# Patient Record
Sex: FEMALE | Race: WHITE | NOT HISPANIC OR LATINO | Employment: FULL TIME | URBAN - METROPOLITAN AREA
[De-identification: names, ages, dates, MRNs, and addresses within clinical notes are randomized per-mention and may not be internally consistent; named-entity substitution may affect disease eponyms.]

---

## 2017-05-02 ENCOUNTER — HOSPITAL ENCOUNTER (EMERGENCY)
Facility: CLINIC | Age: 37
Discharge: HOME | End: 2017-05-02
Attending: EMERGENCY MEDICINE | Admitting: EMERGENCY MEDICINE
Payer: COMMERCIAL

## 2017-05-02 VITALS
TEMPERATURE: 98.2 F | RESPIRATION RATE: 16 BRPM | SYSTOLIC BLOOD PRESSURE: 111 MMHG | OXYGEN SATURATION: 100 % | HEART RATE: 92 BPM | DIASTOLIC BLOOD PRESSURE: 82 MMHG

## 2017-05-02 DIAGNOSIS — H81.399 PERIPHERAL VERTIGO, UNSPECIFIED LATERALITY: ICD-10-CM

## 2017-05-02 LAB
ANION GAP SERPL CALCULATED.3IONS-SCNC: 7 MMOL/L (ref 3–14)
BUN SERPL-MCNC: 12 MG/DL (ref 7–30)
CALCIUM SERPL-MCNC: 9.7 MG/DL (ref 8.5–10.1)
CHLORIDE SERPL-SCNC: 106 MMOL/L (ref 94–109)
CO2 SERPL-SCNC: 26 MMOL/L (ref 20–32)
CREAT SERPL-MCNC: 0.67 MG/DL (ref 0.52–1.04)
ERYTHROCYTE [DISTWIDTH] IN BLOOD BY AUTOMATED COUNT: 12.1 % (ref 10–15)
GFR SERPL CREATININE-BSD FRML MDRD: NORMAL ML/MIN/1.7M2
GLUCOSE SERPL-MCNC: 85 MG/DL (ref 70–99)
HCG SERPL QL: NEGATIVE
HCT VFR BLD AUTO: 41.7 % (ref 35–47)
HGB BLD-MCNC: 13.5 G/DL (ref 11.7–15.7)
INTERPRETATION ECG - MUSE: NORMAL
MCH RBC QN AUTO: 28.7 PG (ref 26.5–33)
MCHC RBC AUTO-ENTMCNC: 32.4 G/DL (ref 31.5–36.5)
MCV RBC AUTO: 89 FL (ref 78–100)
PLATELET # BLD AUTO: 265 10E9/L (ref 150–450)
POTASSIUM SERPL-SCNC: 4.1 MMOL/L (ref 3.4–5.3)
RBC # BLD AUTO: 4.71 10E12/L (ref 3.8–5.2)
SODIUM SERPL-SCNC: 139 MMOL/L (ref 133–144)
WBC # BLD AUTO: 6.5 10E9/L (ref 4–11)

## 2017-05-02 PROCEDURE — 25000128 H RX IP 250 OP 636

## 2017-05-02 PROCEDURE — 96374 THER/PROPH/DIAG INJ IV PUSH: CPT

## 2017-05-02 PROCEDURE — 85027 COMPLETE CBC AUTOMATED: CPT | Performed by: EMERGENCY MEDICINE

## 2017-05-02 PROCEDURE — 96361 HYDRATE IV INFUSION ADD-ON: CPT

## 2017-05-02 PROCEDURE — 84703 CHORIONIC GONADOTROPIN ASSAY: CPT | Performed by: EMERGENCY MEDICINE

## 2017-05-02 PROCEDURE — 80048 BASIC METABOLIC PNL TOTAL CA: CPT | Performed by: EMERGENCY MEDICINE

## 2017-05-02 PROCEDURE — 25000128 H RX IP 250 OP 636: Performed by: EMERGENCY MEDICINE

## 2017-05-02 PROCEDURE — 99285 EMERGENCY DEPT VISIT HI MDM: CPT | Mod: 25

## 2017-05-02 PROCEDURE — 96375 TX/PRO/DX INJ NEW DRUG ADDON: CPT

## 2017-05-02 PROCEDURE — 25000131 ZZH RX MED GY IP 250 OP 636 PS 637: Performed by: EMERGENCY MEDICINE

## 2017-05-02 RX ORDER — METOCLOPRAMIDE HYDROCHLORIDE 5 MG/ML
10 INJECTION INTRAMUSCULAR; INTRAVENOUS ONCE
Status: COMPLETED | OUTPATIENT
Start: 2017-05-02 | End: 2017-05-02

## 2017-05-02 RX ORDER — DIAZEPAM 10 MG/2ML
5 INJECTION, SOLUTION INTRAMUSCULAR; INTRAVENOUS ONCE
Status: COMPLETED | OUTPATIENT
Start: 2017-05-02 | End: 2017-05-02

## 2017-05-02 RX ORDER — ONDANSETRON 4 MG/1
4 TABLET, ORALLY DISINTEGRATING ORAL EVERY 6 HOURS PRN
Qty: 15 TABLET | Refills: 0 | Status: SHIPPED | OUTPATIENT
Start: 2017-05-02 | End: 2017-05-05

## 2017-05-02 RX ORDER — MECLIZINE HYDROCHLORIDE 25 MG/1
25 TABLET ORAL EVERY 6 HOURS PRN
Qty: 20 TABLET | Refills: 0 | Status: ON HOLD | OUTPATIENT
Start: 2017-05-02 | End: 2018-12-29

## 2017-05-02 RX ORDER — MECLIZINE HYDROCHLORIDE 25 MG/1
25 TABLET ORAL ONCE
Status: COMPLETED | OUTPATIENT
Start: 2017-05-02 | End: 2017-05-02

## 2017-05-02 RX ORDER — METOCLOPRAMIDE HYDROCHLORIDE 5 MG/ML
INJECTION INTRAMUSCULAR; INTRAVENOUS
Status: COMPLETED
Start: 2017-05-02 | End: 2017-05-02

## 2017-05-02 RX ORDER — DIPHENHYDRAMINE HYDROCHLORIDE 50 MG/ML
25 INJECTION INTRAMUSCULAR; INTRAVENOUS ONCE
Status: COMPLETED | OUTPATIENT
Start: 2017-05-02 | End: 2017-05-02

## 2017-05-02 RX ORDER — DIAZEPAM 5 MG
5 TABLET ORAL EVERY 6 HOURS PRN
Qty: 12 TABLET | Refills: 0 | Status: SHIPPED | OUTPATIENT
Start: 2017-05-02 | End: 2017-05-09

## 2017-05-02 RX ORDER — PROMETHAZINE HYDROCHLORIDE 25 MG/ML
12.5 INJECTION, SOLUTION INTRAMUSCULAR; INTRAVENOUS ONCE
Status: COMPLETED | OUTPATIENT
Start: 2017-05-02 | End: 2017-05-02

## 2017-05-02 RX ADMIN — DIAZEPAM 5 MG: 5 INJECTION, SOLUTION INTRAMUSCULAR; INTRAVENOUS at 13:30

## 2017-05-02 RX ADMIN — PROMETHAZINE HYDROCHLORIDE 12.5 MG: 25 INJECTION INTRAMUSCULAR; INTRAVENOUS at 13:30

## 2017-05-02 RX ADMIN — METOCLOPRAMIDE 10 MG: 5 INJECTION, SOLUTION INTRAMUSCULAR; INTRAVENOUS at 12:23

## 2017-05-02 RX ADMIN — METOCLOPRAMIDE HYDROCHLORIDE 10 MG: 5 INJECTION INTRAMUSCULAR; INTRAVENOUS at 12:23

## 2017-05-02 RX ADMIN — DIPHENHYDRAMINE HYDROCHLORIDE 25 MG: 50 INJECTION, SOLUTION INTRAMUSCULAR; INTRAVENOUS at 12:22

## 2017-05-02 RX ADMIN — MECLIZINE HYDROCHLORIDE 25 MG: 25 TABLET ORAL at 12:21

## 2017-05-02 RX ADMIN — SODIUM CHLORIDE 1000 ML: 9 INJECTION, SOLUTION INTRAVENOUS at 12:21

## 2017-05-02 ASSESSMENT — ENCOUNTER SYMPTOMS
DIARRHEA: 0
VOMITING: 0
FEVER: 0
ABDOMINAL PAIN: 0
WEAKNESS: 0
DIZZINESS: 1
NUMBNESS: 0
NAUSEA: 1

## 2017-05-02 NOTE — ED NOTES
In Triage: ABC's intact. Alert and oriented x 3.  Pt reports dizziness that started yesterday. Denies CP or SOB. C/o nausea.

## 2017-05-02 NOTE — ED AVS SNAPSHOT
[facility] Emergency Department    [address]    [facility] [address]    Phone:  [phone]    Fax:  [phone]                                       Aga Alanis   MRN: [id number]    Department:  [facility] Emergency Department   Date of Visit:  5/2/2017           After Visit Summary Signature Page     I have received my discharge instructions, and my questions have been answered. I have discussed any challenges I see with this plan with the nurse or doctor.    ..........................................................................................................................................  Patient/Patient Representative Signature      ..........................................................................................................................................  Patient Representative Print Name and Relationship to Patient    ..................................................               ................................................  Date                                            Time    ..........................................................................................................................................  Reviewed by Signature/Title    ...................................................              ..............................................  Date                                                            Time

## 2017-05-02 NOTE — DISCHARGE INSTRUCTIONS
Discharge Instructions  Vertigo  You have been diagnosed with vertigo.  This is a feeling that you are spinning or that the room is moving around you. You will often have nausea, vomiting, and balance problems with it.  Vertigo is usually caused by a problem in the inner ear which helps control your balance.  Many things can cause vertigo, including calcium collections in the inner ear, a virus infection of the inner ear, concussion, migraine, and some medicines.  Luckily, these causes are not life threatening and will eventually go away.  However, sometimes there is a serious problem that does not show up right away.  Return to the Emergency Department if you have:    New or severe headache.    Temperature greater than 100.4 F (38 C).    Seeing double or having trouble seeing clearly.    Trouble speaking or hearing.    Weakness in an arm or leg.    Passing out.    Numbness or tingling.    Chest pain.    Vomiting that will not stop.    Treatment:    An antihistamine, such as Antivert  (meclizine), or non-prescription medicines like Dramamine  (dimenhydrinate), or Benadryl  (diphenhydramine).    Prescription anti-nausea medicines, such as Phenergan  (promethazine), Reglan  (metoclopramide), or Zofran  (ondansetron).    Prescription sedative medicines, such as Valium  (diazepam), Ativan  (lorazepam), or Klonopin  (clonazepam).    Most of these medicines make you sleepy, and you should not take them before you work or drive. You should only take prescription medicines to treat severe vertigo symptoms, and you should stop the medicine when your symptoms improve.    Follow Up:    If you have vertigo longer than three days, it is important that you follow up either with your primary doctor or an Ear Nose and Throat doctor.  You may need further testing to evaluate your vertigo and you may also need  vestibular  therapy which is a special form of physical therapy to make the vertigo go away.    If you were given a  prescription for medicine here today, be sure to read all of the information (including the package insert) that comes with your prescription.  This will include important information about the medicine, its side effects, and any warnings that you need to know about.  The pharmacist who fills the prescription can provide more information and answer questions you may have about the medicine.  If you have questions or concerns that the pharmacist cannot address, please call or return to the Emergency Department.

## 2017-05-02 NOTE — ED PROVIDER NOTES
"  History     Chief Complaint:  Dizziness    HPI   Aga Alanis is a 37 year old female who presents to the emergency department today for evaluation of dizziness. The patient began feeling nauseous and dizzy yesterday afternoon after eating lunch. She has had intermittent dizziness since that time and notes that she was unable to sleep well. She describes her dizziness as making her feel unbalanced and as though she's \"really car sick\" with some slight room spinning. Her dizziness and nausea are worsened with movement, especially with sudden movements of her head. She denies ringing in her ears, hearing changes or changes in vision. She has no numbness or weakness. The patient denies any recent trauma or chiropractic manipulation of the neck. She has no abdominal pain, vomiting or diarrhea. She has not had a fever. Patient does note that at times she felt her racing but it is not constant.     Allergies:  Codeine Sulfate  Demerol   Sulfa Drugs     Medications:    The patient is currently on no regular medications.    Past Medical History:    Depression    Past Surgical History:    Gyn surgery   Breast augmentation, tummy tuck, liposuction (2017)     Family History:    History reviewed. No pertinent family history.     Social History:  The patient was accompanied to the ED by her .  Marital Status:   [2]     Review of Systems   Constitutional: Negative for fever.   HENT: Negative for hearing loss.    Eyes: Negative for visual disturbance.   Gastrointestinal: Positive for nausea. Negative for abdominal pain, diarrhea and vomiting.   Neurological: Positive for dizziness. Negative for weakness and numbness.   All other systems reviewed and are negative.    Physical Exam   Vitals:  Patient Vitals for the past 24 hrs:   BP Temp Temp src Pulse Resp SpO2   05/02/17 1330 114/74 - - - - 99 %   05/02/17 1315 113/82 - - - - (!) 84 %   05/02/17 1300 111/75 - - - - -   05/02/17 1245 117/82 - - - - 99 % "   05/02/17 1201 (!) 120/92 98.2  F (36.8  C) Oral 92 16 99 %        Physical Exam    Constitutional:  Pleasant, age appropriate.      Patient visibly nauseated.  HEENT:    Tympanic membranes are clear and without effusion.     External auditory canals without cerumen impaction.     Oropharynx is moist, without lesions or trismus.  Eyes:    Conjunctiva normal, PERRL     Extra-ocular movements intact.     No nystagmus  Neck:    Supple, no meningismus.     CV:     Regular rate and rhythm.      No murmurs, rubs or gallops.        2+ radial pulses bilateral.       No lower extremity edema.  PULM:    Clear to auscultation bilateral.       No respiratory distress.      Good air exchange.     No rales or wheezing.  ABD:    Soft, non-tender, non-distended.       No pulsatile masses.       No rebound, guarding or rigidity.  MSK:     No gross deformity to all four extremities.   LYMPH:   No cervical lymphadenopathy.  NEURO:   Alert and oriented x 3.      Cranial nerves II-XII intact.     Strength is 5/5 in all 4 extremities.     Sensation intact to all 4 extremities.     Head impulse test normal     Test of skew normal     Romberg normal.     Tandem gait normal.     Gait normal.  Skin:    Warm, dry and intact.    Psych:    Mood is good and affect is appropriate.      Emergency Department Course     ECG:  ECG taken at 1235, ECG read at 1235  Normal sinus rhythm   Rate 85 bpm. MN interval 138. QRS duration 76. QT/QTc 368/437. P-R-T axes 49 28 25.    Laboratory:  Laboratory findings were communicated with the patient who voiced understanding of the findings.    CBC: AWNL. (WBC 6.5, HGB 13.5, )   BMP: AWNL (Creatinine 0.67)  HCG qualitative pregnancy (blood): Negative    Interventions:  1221 ns 1000 mL IV  1221 Meclizine 25 mg PO  1222 Benadryl 25 mg IV  1223 Reglan 10 mg IV  1330 Valium 5 mg IV  1330 Phenergan 12.5 mg IV     Emergency Department Course:  Nursing notes and vitals reviewed.  I performed an exam of the  patient as documented above.   IV was inserted and blood was drawn for laboratory testing, results above.  At 1314 the patient was rechecked and was updated on the results of her laboratory studies. She feels improved. She has some mild nausea and still feels dizzy.  1400 Patient rechecked.   I discussed the treatment plan with the patient. They expressed understanding of this plan and consented to discharge. They will be discharged home with instructions for care and follow up. In addition, the patient will return to the emergency department if their symptoms persist, worsen, if new symptoms arise or if there is any concern.  All questions were answered.  I personally reviewed the laboratory results with the Patient and answered all related questions prior to discharge.    Impression & Plan      Medical Decision Making:  Aga Alanis is a 37 year old female who presents to the emergency department with nausea and dizziness. Her clinical symptoms are suggestive of vertigo, specifically BPPV vs vestibulitis. She has no features concerning for central vertigo to require an advance imaging. Basic laboratory studies are reassuring. She had remarkable improvement with interventions described above. Patient safe for discharge home with supportive treatment with Meclizine, Valium and Zofran. Patient safe for discharge home.     Diagnosis:    ICD-10-CM    1. Peripheral vertigo, unspecified laterality H81.399      Disposition:   Discharge to home    Discharge Medications:  New Prescriptions    DIAZEPAM (VALIUM) 5 MG TABLET    Take 1 tablet (5 mg) by mouth every 6 hours as needed (dizziness)    MECLIZINE (ANTIVERT) 25 MG TABLET    Take 1 tablet (25 mg) by mouth every 6 hours as needed for dizziness    ONDANSETRON (ZOFRAN ODT) 4 MG ODT TAB    Take 1 tablet (4 mg) by mouth every 6 hours as needed for nausea       Scribe Disclosure:  Radha SALAZAR, am serving as a scribe at 11:55 AM on 5/2/2017 to document  services personally performed by Darian Aj MD, based on my observations and the provider's statements to me.    5/2/2017   [facility] EMERGENCY DEPARTMENT       Draian Aj MD  05/02/17 1512

## 2017-05-02 NOTE — ED AVS SNAPSHOT
[facility] Emergency Department    [address] [place] [address]    Phone:  [phone]    Fax:  [phone]                                       Aga Alanis   MRN: [id number]    Department:  [facility] Emergency Department   Date of Visit:  5/2/2017           Patient Information     Date Of Birth          1980        Your diagnoses for this visit were:     Peripheral vertigo, unspecified laterality        You were seen by Darian Aj MD.      Follow-up Information     Follow up with Michael Abbott. Schedule an appointment as soon as possible for a visit in 3 days.    Specialty:  Family Practice    Contact information:    [facility]  [address]  [facility] [address]  [phone]          Discharge Instructions         Discharge Instructions  Vertigo  You have been diagnosed with vertigo.  This is a feeling that you are spinning or that the room is moving around you. You will often have nausea, vomiting, and balance problems with it.  Vertigo is usually caused by a problem in the inner ear which helps control your balance.  Many things can cause vertigo, including calcium collections in the inner ear, a virus infection of the inner ear, concussion, migraine, and some medicines.  Luckily, these causes are not life threatening and will eventually go away.  However, sometimes there is a serious problem that does not show up right away.  Return to the Emergency Department if you have:    New or severe headache.    Temperature greater than 100.4 F (38 C).    Seeing double or having trouble seeing clearly.    Trouble speaking or hearing.    Weakness in an arm or leg.    Passing out.    Numbness or tingling.    Chest pain.    Vomiting that will not stop.    Treatment:    An antihistamine, such as Antivert  (meclizine), or non-prescription medicines like Dramamine  (dimenhydrinate), or Benadryl  (diphenhydramine).    Prescription  anti-nausea medicines, such as Phenergan  (promethazine), Reglan  (metoclopramide), or Zofran  (ondansetron).    Prescription sedative medicines, such as Valium  (diazepam), Ativan  (lorazepam), or Klonopin  (clonazepam).    Most of these medicines make you sleepy, and you should not take them before you work or drive. You should only take prescription medicines to treat severe vertigo symptoms, and you should stop the medicine when your symptoms improve.    Follow Up:    If you have vertigo longer than three days, it is important that you follow up either with your primary doctor or an Ear Nose and Throat doctor.  You may need further testing to evaluate your vertigo and you may also need  vestibular  therapy which is a special form of physical therapy to make the vertigo go away.    If you were given a prescription for medicine here today, be sure to read all of the information (including the package insert) that comes with your prescription.  This will include important information about the medicine, its side effects, and any warnings that you need to know about.  The pharmacist who fills the prescription can provide more information and answer questions you may have about the medicine.  If you have questions or concerns that the pharmacist cannot address, please call or return to the Emergency Department.           24 Hour Appointment Hotline       To make an appointment at any [facility], call [phone] ([phone]). If you don't have a family doctor or clinic, we will help you find one. [facility] are conveniently located to serve the needs of you and your family.             Review of your medicines      START taking        Dose / Directions Last dose taken    diazepam 5 MG tablet   Commonly known as:  VALIUM   Dose:  5 mg   Quantity:  12 tablet        Take 1 tablet (5 mg) by mouth every 6 hours as needed (dizziness)   Refills:  0        meclizine 25 MG tablet   Commonly known as:   ANTIVERT   Dose:  25 mg   Quantity:  20 tablet        Take 1 tablet (25 mg) by mouth every 6 hours as needed for dizziness   Refills:  0        ondansetron 4 MG ODT tab   Commonly known as:  ZOFRAN ODT   Dose:  4 mg   Quantity:  15 tablet        Take 1 tablet (4 mg) by mouth every 6 hours as needed for nausea   Refills:  0          Our records show that you are taking the medicines listed below. If these are incorrect, please call your family doctor or clinic.        Dose / Directions Last dose taken    HYDROcodone-acetaminophen 5-325 MG per tablet   Commonly known as:  NORCO   Dose:  1-2 tablet   Quantity:  15 tablet        Take 1-2 tablets by mouth every 4 hours as needed for pain for 5 doses.   Refills:  0        ibuprofen 600 MG tablet   Commonly known as:  ADVIL/MOTRIN   Dose:  600 mg   Quantity:  30 tablet        Take 1 tablet by mouth every 6 hours as needed for pain.   Refills:  1                Prescriptions were sent or printed at these locations (3 Prescriptions)                   Other Prescriptions                Printed at Department/Unit printer (3 of 3)         ondansetron (ZOFRAN ODT) 4 MG ODT tab               meclizine (ANTIVERT) 25 MG tablet               diazepam (VALIUM) 5 MG tablet                Procedures and tests performed during your visit     Basic metabolic panel (BMP)    CBC (platelets, no diff)    HCG QUALitative pregnancy (blood)      Orders Needing Specimen Collection     None      Pending Results     No orders found from 4/30/2017 to 5/3/2017.            Pending Culture Results     No orders found from 4/30/2017 to 5/3/2017.            Pending Results Instructions     If you had any lab results that were not finalized at the time of your Discharge, you can call the ED Lab Result RN at [phone]. You will be contacted by this team for any positive Lab results or changes in treatment. The nurses are available 7 days a week from 10A to 6:30P.  You can leave a message 24 hours per  day and they will return your call.        Test Results From Your Hospital Stay        5/2/2017  1:05 PM      Component Results     Component Value Ref Range & Units Status    WBC 6.5 4.0 - 11.0 10e9/L Final    RBC Count 4.71 3.8 - 5.2 10e12/L Final    Hemoglobin 13.5 11.7 - 15.7 g/dL Final    Hematocrit 41.7 35.0 - 47.0 % Final    MCV 89 78 - 100 fl Final    MCH 28.7 26.5 - 33.0 pg Final    MCHC 32.4 31.5 - 36.5 g/dL Final    RDW 12.1 10.0 - 15.0 % Final    Platelet Count 265 150 - 450 10e9/L Final         5/2/2017  1:26 PM      Component Results     Component Value Ref Range & Units Status    Sodium 139 133 - 144 mmol/L Final    Potassium 4.1 3.4 - 5.3 mmol/L Final    Chloride 106 94 - 109 mmol/L Final    Carbon Dioxide 26 20 - 32 mmol/L Final    Anion Gap 7 3 - 14 mmol/L Final    Glucose 85 70 - 99 mg/dL Final    Urea Nitrogen 12 7 - 30 mg/dL Final    Creatinine 0.67 0.52 - 1.04 mg/dL Final    GFR Estimate >90  Non  GFR Calc   >60 mL/min/1.7m2 Final    GFR Estimate If Black >90   GFR Calc   >60 mL/min/1.7m2 Final    Calcium 9.7 8.5 - 10.1 mg/dL Final         5/2/2017  1:25 PM      Component Results     Component Value Ref Range & Units Status    HCG Qualitative Serum Negative NEG Final                Clinical Quality Measure: Blood Pressure Screening     Your blood pressure was checked while you were in the emergency department today. The last reading we obtained was  BP: 114/74 . Please read the guidelines below about what these numbers mean and what you should do about them.  If your systolic blood pressure (the top number) is less than 120 and your diastolic blood pressure (the bottom number) is less than 80, then your blood pressure is normal. There is nothing more that you need to do about it.  If your systolic blood pressure (the top number) is 120-139 or your diastolic blood pressure (the bottom number) is 80-89, your blood pressure may be higher than it should be. You  "should have your blood pressure rechecked within a year by a primary care provider.  If your systolic blood pressure (the top number) is 140 or greater or your diastolic blood pressure (the bottom number) is 90 or greater, you may have high blood pressure. High blood pressure is treatable, but if left untreated over time it can put you at risk for heart attack, stroke, or kidney failure. You should have your blood pressure rechecked by a primary care provider within the next 4 weeks.  If your provider in the emergency department today gave you specific instructions to follow-up with your doctor or provider even sooner than that, you should follow that instruction and not wait for up to 4 weeks for your follow-up visit.        Thank you for choosing [facility]       Thank you for choosing [facility] for your care. Our goal is always to provide you with excellent care. Hearing back from our patients is one way we can continue to improve our services. Please take a few minutes to complete the written survey that you may receive in the mail after you visit with us. Thank you!        [employer] Information     [employer] lets you send messages to your doctor, view your test results, renew your prescriptions, schedule appointments and more. To sign up, go to [email or web address] . Click on \"Log in\" on the left side of the screen, which will take you to the Welcome page. Then click on \"Sign up Now\" on the right side of the page.     You will be asked to enter the access code listed below, as well as some personal information. Please follow the directions to create your username and password.     Your access code is: [email or web address]  Expires: 2017  2:03 PM     Your access code will  in 90 days. If you need help or a new code, please call your [facility] clinic or [phone].        Care EveryWhere ID     This is your Care EveryWhere ID. This could be used by other organizations to access your [facility] medical " records  [id number]        After Visit Summary       This is your record. Keep this with you and show to your community pharmacist(s) and doctor(s) at your next visit.

## 2017-05-08 ENCOUNTER — APPOINTMENT (OUTPATIENT)
Dept: ULTRASOUND IMAGING | Facility: CLINIC | Age: 37
End: 2017-05-08
Attending: EMERGENCY MEDICINE
Payer: COMMERCIAL

## 2017-05-08 ENCOUNTER — HOSPITAL ENCOUNTER (EMERGENCY)
Facility: CLINIC | Age: 37
Discharge: HOME | End: 2017-05-08
Attending: EMERGENCY MEDICINE | Admitting: EMERGENCY MEDICINE
Payer: COMMERCIAL

## 2017-05-08 VITALS
RESPIRATION RATE: 20 BRPM | SYSTOLIC BLOOD PRESSURE: 137 MMHG | WEIGHT: 184 LBS | HEART RATE: 124 BPM | TEMPERATURE: 98.1 F | OXYGEN SATURATION: 100 % | DIASTOLIC BLOOD PRESSURE: 85 MMHG

## 2017-05-08 DIAGNOSIS — I82.622 ACUTE DEEP VEIN THROMBOSIS (DVT) OF BRACHIAL VEIN OF LEFT UPPER EXTREMITY (H): ICD-10-CM

## 2017-05-08 PROCEDURE — 93971 EXTREMITY STUDY: CPT | Mod: LT

## 2017-05-08 PROCEDURE — 25000128 H RX IP 250 OP 636: Performed by: EMERGENCY MEDICINE

## 2017-05-08 PROCEDURE — 25000125 ZZHC RX 250: Performed by: EMERGENCY MEDICINE

## 2017-05-08 PROCEDURE — 99284 EMERGENCY DEPT VISIT MOD MDM: CPT | Mod: 25

## 2017-05-08 PROCEDURE — 25000132 ZZH RX MED GY IP 250 OP 250 PS 637: Performed by: EMERGENCY MEDICINE

## 2017-05-08 PROCEDURE — 96372 THER/PROPH/DIAG INJ SC/IM: CPT

## 2017-05-08 RX ORDER — OXYCODONE HYDROCHLORIDE 5 MG/1
2.5-5 TABLET ORAL EVERY 6 HOURS PRN
Qty: 20 TABLET | Refills: 0 | Status: ON HOLD | OUTPATIENT
Start: 2017-05-08 | End: 2018-12-29

## 2017-05-08 RX ORDER — ACETAMINOPHEN 500 MG
1000 TABLET ORAL 3 TIMES DAILY
Qty: 18 TABLET | Refills: 0 | Status: SHIPPED | OUTPATIENT
Start: 2017-05-08 | End: 2017-05-11

## 2017-05-08 RX ORDER — DABIGATRAN ETEXILATE 150 MG/1
CAPSULE ORAL
Qty: 60 CAPSULE | Refills: 5 | Status: SHIPPED | OUTPATIENT
Start: 2017-05-08 | End: 2017-09-06

## 2017-05-08 RX ORDER — IBUPROFEN 200 MG
600 TABLET ORAL ONCE
Status: COMPLETED | OUTPATIENT
Start: 2017-05-08 | End: 2017-05-08

## 2017-05-08 RX ORDER — ONDANSETRON 4 MG/1
8 TABLET, ORALLY DISINTEGRATING ORAL ONCE
Status: COMPLETED | OUTPATIENT
Start: 2017-05-08 | End: 2017-05-08

## 2017-05-08 RX ADMIN — ONDANSETRON 8 MG: 4 TABLET, ORALLY DISINTEGRATING ORAL at 10:58

## 2017-05-08 RX ADMIN — ENOXAPARIN SODIUM 80 MG: 80 INJECTION SUBCUTANEOUS at 13:02

## 2017-05-08 RX ADMIN — IBUPROFEN 600 MG: 200 TABLET, FILM COATED ORAL at 12:07

## 2017-05-08 ASSESSMENT — ENCOUNTER SYMPTOMS
NUMBNESS: 1
NAUSEA: 1

## 2017-05-08 NOTE — DISCHARGE INSTRUCTIONS
Understanding Deep Vein Thrombosis  Deep vein thrombosis (DVT) is a condition with a blood clot or thrombus in a deep vein. A blood clot is most common in the leg, but can develop in a large vein deep inside the leg, arm, or other part of the body. Part of the clot called an embolus can separate from the vein. It may travel to the lungs and form a pulmonary embolus (PE). This can cut off the flow of blood. A PE is a medical emergency and may cause death. Health care providers use the term venous thromboembolism (VTE) to describe the 2 conditions, DVT and PE. They use the term VTE because the 2 conditions are very closely related. And, because their prevention and treatment are closely related.  Over time, a blood clot can also permanently damage veins. To protect your health, a blood clot must be treated right away.  How DVT develops  The deep veins of the legs are located in the muscles. These help carry blood from the legs to the heart. When leg muscles contract and relax, blood is squeezed through the veins back to the heart. One-way valves inside the veins help keep the blood moving in the right direction. When blood moves too slowly or not at all, it can pool in the veins. This makes a clot more likely to form.    Risk factors  Anyone can develop a blood clot. The following risk factors make a blood more likely to happen:    Being inactive for a long period, such as when you re in the hospital, or traveling by plane or car    Injury to a vein from an accident, a broken bone, or surgery    Having blood clots in the past    Personal or family history of a blood-clotting disorder    Recent surgery    Cancer and certain cancer treatments    Smoking  Other factors can also put you at higher risk for a blood clot. They include:    Age over 60 years    Pregnancy    Taking birth control or hormone replacement    Having other vein problems    Being overweight  Common symptoms  A blood clot does not always cause  obvious symptoms. If you do have symptoms, they usually occur suddenly. They may include:    Pain, especially deep in the muscle    Swelling    Aching or tenderness    Red or warm skin  Call your health care provider if you have these symptoms.  Symptoms of pulmonary embolism may include:    Trouble breathing    Fast heartbeat    Chest pain    Sweating    Coughing (may cough up blood)    Fainting  Call 911 if you have these symptoms.   If you take medicine to help prevent blood clots, you have an increased risk of bleeding. Call 911 if you have heavy or uncontrolled bleeding. Call your health care provider if you have other signs or symptoms of bleeding like blood in the urine, bleeding with bowel movements, or bleeding from the nose, gums, a cut, or vagina.  Diagnosing DVT  Diagnosis begins with thorough questions about your symptoms and medical history along with a physical exam.  Diagnostic tests include:    An imaging test called a duplex ultrasound. This test uses sound waves to create pictures of veins and blood flow.    Blood tests to check for clotting and other problems.  If your health care provider thinks you may have pulmonary embolism, additional testing will be done.  Treating DVT  Treating a blood clot may include:    Medicine to thin the blood and prevent pulmonary embolism and other complications.    Staying in the hospital. This may or may not be necessary.    Surgery for some people, like those who cannot take blood-thinning medicines.  Preventing DVT  Many people who are in the hospital are at an increased risk of developing blood clots. So, preventing blood clots is an important part of in-hospital care. The care may include getting out of bed regularly, taking medicine, or using special therapies or devices. Other factors and conditions may increase the risk of blood clots. Review your risk with your health care provider.    1191-2309 [employer]. [address] [place] [address]  [place] [address]. All rights reserved. This information is not intended as a substitute for professional medical care. Always follow your healthcare professional's instructions.

## 2017-05-08 NOTE — ED PROVIDER NOTES
History     Chief Complaint:  Arm Pain    HPI   Aga Alanis is a 37 year old female who presents to the emergency department today for evaluation of arm pain. The patient was seen here in the emergency department on 05/02/2017 for dizziness, had an IV placed in her left arm, and was then discharged. The patient reports that ever since she was discharged she has had pain at the site of her IV and in her left axilla, left arm, and left hand numbness. She reports that last night she could not sleep due to her pain despite taking a Percocet at 0000 and this morning when she woke up she was nauseous. She denies any history of DVT.     Allergies:  Codeine Sulfate  Demerol  Sulfa Drugs     Medications:    Antivert  Valium  Ibuprofen  Hydrocodone-Acetaminophen     Past Medical History:    No past medical history on file.    Past Surgical History:    GYN Surgery     Family History:    No family history on file.    Social History:  Smoking Status: Never Smoker  Marital Status:   [2]     Review of Systems   Gastrointestinal: Positive for nausea.   Musculoskeletal:        Left arm pain   Neurological: Positive for numbness (Left hand).   All other systems reviewed and are negative.    Physical Exam   Vitals:  Patient Vitals for the past 24 hrs:   BP Temp Temp src Pulse Resp SpO2 Weight   05/08/17 1233 - - - - - 100 % -   05/08/17 1232 137/85 - - - - - 83.5 kg (184 lb)   05/08/17 1019 (!) 139/94 98.1  F (36.7  C) Oral 124 20 100 % -      Physical Exam  General: Patient is alert and interactive when I enter the room  Head:  The scalp, face, and head appear normal  Eyes:  The pupils are equal, round, and reactive to light    Conjunctivae and sclerae are normal  ENT:    External acoustic canals are normal    The oropharynx is normal without erythema.     Uvula is in the midline  Neck:  Normal range of motion  CV:  Regular rate. S1/S2. No murmurs.   Resp:  Lungs are clear without wheezes or rales. No  distress  GI:  Abdomen is soft, no rigidity, guarding, or rebound    No distension. No tenderness to palpation in any quadrant.     MS:  Normal tone. Joints grossly normal without effusions.     No asymmetric leg swelling, calf or thigh tenderness.      Normal motor assessment of all extremities.    Fullness in  Left medial antecubital fossa with some bruising    Tenderness in the medial upper arm     Tenderness in left axilla. Minimal to no LUE swelling  Skin:  No rash or lesions noted. Normal capillary refill noted  Neuro: Speech is normal and fluent. Face is symmetric.     Moving all extremities well.   Psych:  Awake. Alert.  Normal affect.  Appropriate interactions.  Lymph: No anterior cervical lymphadenopathy noted    Emergency Department Course     Imaging:  Radiology findings were communicated with the patient who voiced understanding of the findings.    Arm, left, venous US  Positive for brachial and basilic vein thrombosis.  Reading per radiology    Interventions:  1058 Ondansetron 8 mg PO  1207 Ibuprofen 600 mg PO  1302 Lovenox 80 mg Subcutaneous    Emergency Department Course:  Nursing notes and vitals reviewed.  I performed an exam of the patient as documented above.   The patient was sent for a Arm, left, venous US while in the emergency department, results above.   1230 I spoke with a pharmacist regarding patient's findings and plan of care.  I discussed the treatment plan with the patient. They expressed understanding of this plan and consented to discharge. They will be discharged home with instructions for care and follow up. In addition, the patient will return to the emergency department if their symptoms persist, worsen, if new symptoms arise or if there is any concern.  All questions were answered.  I personally reviewed the imaging results with the patient and answered all related questions prior to discharge.  Impression & Plan      Medical Decision Making:  Aga Alanis is a 37  year old female who presents for evaluation of left arm pain.  Doppler ultrasound demonstrated a deep venous thrombosis.  This was discussed with the patient.  There are no symptoms to suggest this has progressed to pulmonary embolism.  First dose of Lovenox was initiated after discussion with the patient after clarification of any contraindications to this therapy.  There are none but patient understands the risk/benefit ratio to this therapy and the possibility of serious and/or life-threatening hemorrhage. There is no indication at this point for thrombolysis or contacting the interventional team for directed thrombolytics.  No sx's to suggest PE and I would not CT scan chest. The patient was given her first doses and instructed on giving Lovenox at home for next 5 days then initiating pradaxa.  Discussed stopping oral contraceptive until cleared by vascular medicine. The patient is given precautions to return if any shortness of breath, chest pain, hemoptysis, lightheadedness, syncope or other new or worsening symptoms.    Diagnosis:    ICD-10-CM    1. Acute deep vein thrombosis (DVT) of brachial vein of left upper extremity (H) I82.622      Disposition:   The patient is discharged to home.    Discharge Medications:  New Prescriptions    ACETAMINOPHEN (TYLENOL) 500 MG TABLET    Take 2 tablets (1,000 mg) by mouth 3 times daily for 3 days Scheduled for 3 days.    DABIGATRAN ANTICOAGULANT (PRADAXA) 150 MG CAPS CAPSULE    Take 1 capsule (150 mg) by mouth twice daily. Store in original 's bottle or blister pack; use within 120 days of opening.    ENOXAPARIN (LOVENOX) 80 MG/0.8ML INJECTION    Inject 0.8 mLs (80 mg) Subcutaneous 2 times daily for 5 days    OXYCODONE (ROXICODONE) 5 MG IR TABLET    Take 0.5-1 tablets (2.5-5 mg) by mouth every 6 hours as needed for moderate to severe pain     Scribe Disclosure:  Neel SALAZAR, am serving as a scribe at 10:48 AM on 5/8/2017 to document services personally  performed by Duane Del Real MD, based on my observations and the provider's statements to me.    [facility] EMERGENCY DEPARTMENT       Duane Del Rael MD  05/08/17 0721

## 2017-05-08 NOTE — ED NOTES
Patient presents to the ED with left arm pain. Reports was seen on Tuesday for vertigo and had an IV placed. Reports has had pain at site since visit. States pain is getting worse and is traveling up her arm. Also reports general malaise.

## 2017-05-08 NOTE — ED AVS SNAPSHOT
[facility] Emergency Department    [address]    [facility] [address]    Phone:  [phone]    Fax:  [phone]                                       Aga Alanis   MRN: [id number]    Department:  [facility] Emergency Department   Date of Visit:  5/8/2017           After Visit Summary Signature Page     I have received my discharge instructions, and my questions have been answered. I have discussed any challenges I see with this plan with the nurse or doctor.    ..........................................................................................................................................  Patient/Patient Representative Signature      ..........................................................................................................................................  Patient Representative Print Name and Relationship to Patient    ..................................................               ................................................  Date                                            Time    ..........................................................................................................................................  Reviewed by Signature/Title    ...................................................              ..............................................  Date                                                            Time

## 2017-05-08 NOTE — ED AVS SNAPSHOT
[facility] Emergency Department    [address] [place] [address]    Phone:  [phone]    Fax:  [phone]                                       Aga Alanis   MRN: [id number]    Department:  [facility] Emergency Department   Date of Visit:  5/8/2017           Patient Information     Date Of Birth          1980        Your diagnoses for this visit were:     Acute deep vein thrombosis (DVT) of brachial vein of left upper extremity (H)        You were seen by Duane Del Real MD.      Follow-up Information     Follow up with John Bhandari MD In 2 weeks.    Specialties:  Internal Medicine, Internal Medicine    Contact information:    [facility]  [address]  [place] [address]  [phone]          Follow up with Michael Abbott In 2 days.    Specialty:  Family Practice    Contact information:    [facility]  [address]  [facility] [address]  [phone]          Please follow up.    Why:  stop your oral contraceptive pills you use to regulate periods until you discuss with Dr. Bhandari        Discharge Instructions         Understanding Deep Vein Thrombosis  Deep vein thrombosis (DVT) is a condition with a blood clot or thrombus in a deep vein. A blood clot is most common in the leg, but can develop in a large vein deep inside the leg, arm, or other part of the body. Part of the clot called an embolus can separate from the vein. It may travel to the lungs and form a pulmonary embolus (PE). This can cut off the flow of blood. A PE is a medical emergency and may cause death. Health care providers use the term venous thromboembolism (VTE) to describe the 2 conditions, DVT and PE. They use the term VTE because the 2 conditions are very closely related. And, because their prevention and treatment are closely related.  Over time, a blood clot can also permanently damage veins. To protect your health, a blood clot  must be treated right away.  How DVT develops  The deep veins of the legs are located in the muscles. These help carry blood from the legs to the heart. When leg muscles contract and relax, blood is squeezed through the veins back to the heart. One-way valves inside the veins help keep the blood moving in the right direction. When blood moves too slowly or not at all, it can pool in the veins. This makes a clot more likely to form.    Risk factors  Anyone can develop a blood clot. The following risk factors make a blood more likely to happen:    Being inactive for a long period, such as when you re in the hospital, or traveling by plane or car    Injury to a vein from an accident, a broken bone, or surgery    Having blood clots in the past    Personal or family history of a blood-clotting disorder    Recent surgery    Cancer and certain cancer treatments    Smoking  Other factors can also put you at higher risk for a blood clot. They include:    Age over 60 years    Pregnancy    Taking birth control or hormone replacement    Having other vein problems    Being overweight  Common symptoms  A blood clot does not always cause obvious symptoms. If you do have symptoms, they usually occur suddenly. They may include:    Pain, especially deep in the muscle    Swelling    Aching or tenderness    Red or warm skin  Call your health care provider if you have these symptoms.  Symptoms of pulmonary embolism may include:    Trouble breathing    Fast heartbeat    Chest pain    Sweating    Coughing (may cough up blood)    Fainting  Call 911 if you have these symptoms.   If you take medicine to help prevent blood clots, you have an increased risk of bleeding. Call 911 if you have heavy or uncontrolled bleeding. Call your health care provider if you have other signs or symptoms of bleeding like blood in the urine, bleeding with bowel movements, or bleeding from the nose, gums, a cut, or vagina.  Diagnosing DVT  Diagnosis begins with  thorough questions about your symptoms and medical history along with a physical exam.  Diagnostic tests include:    An imaging test called a duplex ultrasound. This test uses sound waves to create pictures of veins and blood flow.    Blood tests to check for clotting and other problems.  If your health care provider thinks you may have pulmonary embolism, additional testing will be done.  Treating DVT  Treating a blood clot may include:    Medicine to thin the blood and prevent pulmonary embolism and other complications.    Staying in the hospital. This may or may not be necessary.    Surgery for some people, like those who cannot take blood-thinning medicines.  Preventing DVT  Many people who are in the hospital are at an increased risk of developing blood clots. So, preventing blood clots is an important part of in-hospital care. The care may include getting out of bed regularly, taking medicine, or using special therapies or devices. Other factors and conditions may increase the risk of blood clots. Review your risk with your health care provider.    6650-7791 [employer]. [address] [place] [address]. All rights reserved. This information is not intended as a substitute for professional medical care. Always follow your healthcare professional's instructions.          24 Hour Appointment Hotline       To make an appointment at any [facility], call [phone] ([phone]). If you don't have a family doctor or clinic, we will help you find one. [facility] are conveniently located to serve the needs of you and your family.             Review of your medicines      START taking        Dose / Directions Last dose taken    acetaminophen 500 MG tablet   Commonly known as:  TYLENOL   Dose:  1000 mg   Quantity:  18 tablet        Take 2 tablets (1,000 mg) by mouth 3 times daily for 3 days Scheduled for 3 days.   Refills:  0        dabigatran ANTICOAGULANT 150 MG Caps capsule    Commonly known as:  PRADAXA   Quantity:  60 capsule        Take 1 capsule (150 mg) by mouth twice daily. Store in original 's bottle or blister pack; use within 120 days of opening.   Refills:  5        enoxaparin 80 MG/0.8ML injection   Commonly known as:  LOVENOX   Dose:  80 mg   Quantity:  8 mL        Inject 0.8 mLs (80 mg) Subcutaneous 2 times daily for 5 days   Refills:  0        oxyCODONE 5 MG IR tablet   Commonly known as:  ROXICODONE   Dose:  2.5-5 mg   Quantity:  20 tablet        Take 0.5-1 tablets (2.5-5 mg) by mouth every 6 hours as needed for moderate to severe pain   Refills:  0          Our records show that you are taking the medicines listed below. If these are incorrect, please call your family doctor or clinic.        Dose / Directions Last dose taken    diazepam 5 MG tablet   Commonly known as:  VALIUM   Dose:  5 mg   Quantity:  12 tablet        Take 1 tablet (5 mg) by mouth every 6 hours as needed (dizziness)   Refills:  0        HYDROcodone-acetaminophen 5-325 MG per tablet   Commonly known as:  NORCO   Dose:  1-2 tablet   Quantity:  15 tablet        Take 1-2 tablets by mouth every 4 hours as needed for pain for 5 doses.   Refills:  0        ibuprofen 600 MG tablet   Commonly known as:  ADVIL/MOTRIN   Dose:  600 mg   Quantity:  30 tablet        Take 1 tablet by mouth every 6 hours as needed for pain.   Refills:  1        meclizine 25 MG tablet   Commonly known as:  ANTIVERT   Dose:  25 mg   Quantity:  20 tablet        Take 1 tablet (25 mg) by mouth every 6 hours as needed for dizziness   Refills:  0                Prescriptions were sent or printed at these locations (4 Prescriptions)                   Other Prescriptions                Printed at Department/Unit printer (4 of 4)         enoxaparin (LOVENOX) 80 MG/0.8ML injection               dabigatran ANTICOAGULANT (PRADAXA) 150 MG CAPS capsule               acetaminophen (TYLENOL) 500 MG tablet               oxyCODONE  (ROXICODONE) 5 MG IR tablet                Procedures and tests performed during your visit     Arm, left, venous US      Orders Needing Specimen Collection     None      Pending Results     Date and Time Order Name Status Description    5/8/2017 1055 Arm, left, venous US Preliminary             Pending Culture Results     No orders found from 5/6/2017 to 5/9/2017.            Pending Results Instructions     If you had any lab results that were not finalized at the time of your Discharge, you can call the ED Lab Result RN at [phone]. You will be contacted by this team for any positive Lab results or changes in treatment. The nurses are available 7 days a week from 10A to 6:30P.  You can leave a message 24 hours per day and they will return your call.        Test Results From Your Hospital Stay        5/8/2017 11:59 AM      Narrative     ULTRASOUND LEFT UPPER EXTREMITY VENOUS DUPLEX May 8, 2017 11:53 AM    HISTORY: Pain, previous IV at antecubital fossa, evaluate for deep  vein thrombosis, superficial thrombophlebitis.    TECHNIQUE: Venous Doppler US. Color flow and spectral Doppler with  waveform analysis performed.    FINDINGS: There is a short segment of occlusive thrombus within the  upper brachial vein. There is also thrombus along the basilic vein  from the elbow to the upper arm level.        Impression     IMPRESSION: Positive for brachial and basilic vein thrombosis.                Clinical Quality Measure: Blood Pressure Screening     Your blood pressure was checked while you were in the emergency department today. The last reading we obtained was  BP: 137/85 . Please read the guidelines below about what these numbers mean and what you should do about them.  If your systolic blood pressure (the top number) is less than 120 and your diastolic blood pressure (the bottom number) is less than 80, then your blood pressure is normal. There is nothing more that you need to do about it.  If your systolic blood  "pressure (the top number) is 120-139 or your diastolic blood pressure (the bottom number) is 80-89, your blood pressure may be higher than it should be. You should have your blood pressure rechecked within a year by a primary care provider.  If your systolic blood pressure (the top number) is 140 or greater or your diastolic blood pressure (the bottom number) is 90 or greater, you may have high blood pressure. High blood pressure is treatable, but if left untreated over time it can put you at risk for heart attack, stroke, or kidney failure. You should have your blood pressure rechecked by a primary care provider within the next 4 weeks.  If your provider in the emergency department today gave you specific instructions to follow-up with your doctor or provider even sooner than that, you should follow that instruction and not wait for up to 4 weeks for your follow-up visit.        Thank you for choosing [facility]       Thank you for choosing [facility] for your care. Our goal is always to provide you with excellent care. Hearing back from our patients is one way we can continue to improve our services. Please take a few minutes to complete the written survey that you may receive in the mail after you visit with us. Thank you!        [employer] Information     [employer] lets you send messages to your doctor, view your test results, renew your prescriptions, schedule appointments and more. To sign up, go to [email or web address] . Click on \"Log in\" on the left side of the screen, which will take you to the Welcome page. Then click on \"Sign up Now\" on the right side of the page.     You will be asked to enter the access code listed below, as well as some personal information. Please follow the directions to create your username and password.     Your access code is: [email or web address]  Expires: 2017  2:03 PM     Your access code will  in 90 days. If you need help or a new code, please call your [facility] clinic or " [phone].        Care EveryWhere ID     This is your Care EveryWhere ID. This could be used by other organizations to access your [place] medical records  [id number]        After Visit Summary       This is your record. Keep this with you and show to your community pharmacist(s) and doctor(s) at your next visit.

## 2017-05-26 ENCOUNTER — OFFICE VISIT (OUTPATIENT)
Dept: OTHER | Facility: CLINIC | Age: 37
End: 2017-05-26
Attending: INTERNAL MEDICINE
Payer: COMMERCIAL

## 2017-05-26 VITALS
BODY MASS INDEX: 32.78 KG/M2 | WEIGHT: 192 LBS | DIASTOLIC BLOOD PRESSURE: 79 MMHG | SYSTOLIC BLOOD PRESSURE: 107 MMHG | OXYGEN SATURATION: 100 % | HEART RATE: 98 BPM | HEIGHT: 64 IN

## 2017-05-26 DIAGNOSIS — I82.622 ACUTE DEEP VEIN THROMBOSIS (DVT) OF BRACHIAL VEIN OF LEFT UPPER EXTREMITY (H): Primary | ICD-10-CM

## 2017-05-26 PROCEDURE — 99211 OFF/OP EST MAY X REQ PHY/QHP: CPT

## 2017-05-26 PROCEDURE — 99245 OFF/OP CONSLTJ NEW/EST HI 55: CPT | Mod: ZP | Performed by: INTERNAL MEDICINE

## 2017-05-26 RX ORDER — CYCLOBENZAPRINE HCL 10 MG
TABLET ORAL
Status: ON HOLD | COMMUNITY
Start: 2017-02-10 | End: 2018-12-29

## 2017-05-26 RX ORDER — DABIGATRAN ETEXILATE 150 MG/1
CAPSULE ORAL
COMMUNITY
Start: 2017-05-08 | End: 2017-09-06

## 2017-05-26 RX ORDER — VALACYCLOVIR HYDROCHLORIDE 1 G/1
2000 TABLET, FILM COATED ORAL PRN
COMMUNITY
Start: 2016-09-19 | End: 2024-05-31

## 2017-05-26 RX ORDER — BIOTIN 10 MG
10 TABLET ORAL EVERY MORNING
COMMUNITY
Start: 2016-05-24

## 2017-05-26 RX ORDER — LISDEXAMFETAMINE DIMESYLATE 10 MG/1
20 CAPSULE ORAL EVERY MORNING
COMMUNITY
Start: 2017-05-09 | End: 2019-02-16

## 2017-05-26 RX ORDER — BUPROPION HYDROCHLORIDE 150 MG/1
150 TABLET ORAL
Status: ON HOLD | COMMUNITY
Start: 2017-05-15 | End: 2018-12-29

## 2017-05-26 RX ORDER — CLINDAMYCIN PHOSPHATE 20 MG/G
CREAM VAGINAL
Status: ON HOLD | COMMUNITY
Start: 2017-01-18 | End: 2018-12-29

## 2017-05-26 RX ORDER — DESVENLAFAXINE 100 MG/1
150 TABLET, EXTENDED RELEASE ORAL EVERY MORNING
COMMUNITY
Start: 2017-05-15 | End: 2021-01-20

## 2017-05-26 RX ORDER — NITROFURANTOIN 25; 75 MG/1; MG/1
100 CAPSULE ORAL
Status: ON HOLD | COMMUNITY
Start: 2016-04-22 | End: 2018-12-29

## 2017-05-26 RX ORDER — CHOLECALCIFEROL (VITAMIN D3) 50 MCG
2000 TABLET ORAL EVERY MORNING
Status: ON HOLD | COMMUNITY
Start: 2016-05-24 | End: 2021-04-27

## 2017-05-26 RX ORDER — LACTOBACILLUS RHAMNOSUS GG 10B CELL
1 CAPSULE ORAL EVERY MORNING
COMMUNITY
Start: 2015-01-22

## 2017-05-26 NOTE — NURSING NOTE
"Chief Complaint   Patient presents with     Consult     [facility] f/u for DVT       Initial /79 (BP Location: Right arm, Patient Position: Chair, Cuff Size: Adult Large)  Pulse 98  Ht 5' 4\" (1.626 m)  Wt 192 lb (87.1 kg)  SpO2 100%  Breastfeeding? Unknown  BMI 32.96 kg/m2 Estimated body mass index is 32.96 kg/(m^2) as calculated from the following:    Height as of this encounter: 5' 4\" (1.626 m).    Weight as of this encounter: 192 lb (87.1 kg).  Medication Reconciliation: complete     Face to Face nursing time 7 minutes     Francia Cortes CMA      "

## 2017-05-26 NOTE — MR AVS SNAPSHOT
"              After Visit Summary   2017    Aga Alanis    MRN: [id number]           Patient Information     Date Of Birth          1980        Visit Information        Provider Department      2017 8:30 AM John Bhandari MD [facility]      Today's Diagnoses     Acute deep vein thrombosis (DVT) of brachial vein of left upper extremity (H)    -  1       Follow-ups after your visit        Who to contact     If you have questions or need follow up information about today's clinic visit or your schedule please contact [facility] directly at [phone].  Normal or non-critical lab and imaging results will be communicated to you by MyChart, letter or phone within 4 business days after the clinic has received the results. If you do not hear from us within 7 days, please contact the clinic through MyChart or phone. If you have a critical or abnormal lab result, we will notify you by phone as soon as possible.  Submit refill requests through [employer] or call your pharmacy and they will forward the refill request to us. Please allow 3 business days for your refill to be completed.          Additional Information About Your Visit        MyChart Information     [employer] lets you send messages to your doctor, view your test results, renew your prescriptions, schedule appointments and more. To sign up, go to [email or web address] . Click on \"Log in\" on the left side of the screen, which will take you to the Welcome page. Then click on \"Sign up Now\" on the right side of the page.     You will be asked to enter the access code listed below, as well as some personal information. Please follow the directions to create your username and password.     Your access code is: [email or web address]  Expires: 2017  2:03 PM     Your access code will  in 90 days. If you need help or a new code, please call your " "[facility] or [phone].        Care EveryWhere ID     This is your Care EveryWhere ID. This could be used by other organizations to access your [place] medical records  [id number]        Your Vitals Were     Pulse Height Pulse Oximetry Breastfeeding? BMI (Body Mass Index)       98 5' 4\" (1.626 m) 100% Unknown 32.96 kg/m2        Blood Pressure from Last 3 Encounters:   05/26/17 107/79   05/08/17 137/85   05/02/17 111/82    Weight from Last 3 Encounters:   05/26/17 192 lb (87.1 kg)   05/08/17 184 lb (83.5 kg)              Today, you had the following     No orders found for display       Primary Care Provider Office Phone # Fax #    Michael Abbott [phone]       [facility] [place] [address]  [place] [address]        Thank you!     Thank you for choosing [facility]  for your care. Our goal is always to provide you with excellent care. Hearing back from our patients is one way we can continue to improve our services. Please take a few minutes to complete the written survey that you may receive in the mail after your visit with us. Thank you!             Your Updated Medication List - Protect others around you: Learn how to safely use, store and throw away your medicines at www.disposemymeds.org.          This list is accurate as of: 5/26/17 11:59 PM.  Always use your most recent med list.                   Brand Name Dispense Instructions for use    Biotin 10 MG Tabs tablet      Take 10 mg by mouth       buPROPion 150 MG 24 hr tablet    WELLBUTRIN XL     Take 150 mg by mouth       clindamycin 2 % cream    CLEOCIN     one time if needed.       cyclobenzaprine 10 MG tablet    FLEXERIL     3 times daily if needed.       * dabigatran ANTICOAGULANT 150 MG Caps capsule    PRADAXA    60 capsule    Take 1 capsule (150 mg) by mouth twice daily. Store in original 's bottle or blister pack; use within 120 days of opening.       * dabigatran ANTICOAGULANT 150 " MG Caps capsule    PRADAXA     Take 1 capsule (150 mg) by mouth twice daily. Store in original 's bottle or blister pack; use within 120 days of opening.       desvenlafaxine succinate 100 MG 24 hr tablet    PRISTIQ     Take 100 mg by mouth       HYDROcodone-acetaminophen 5-325 MG per tablet    NORCO    15 tablet    Take 1-2 tablets by mouth every 4 hours as needed for pain for 5 doses.       ibuprofen 600 MG tablet    ADVIL/MOTRIN    30 tablet    Take 1 tablet by mouth every 6 hours as needed for pain.       lactobacillus rhamnosus (GG) capsule      Take 1 capsule by mouth       lisdexamfetamine dimesylate 10 MG capsule    VYVANSE     Take 10 mg by mouth       meclizine 25 MG tablet    ANTIVERT    20 tablet    Take 1 tablet (25 mg) by mouth every 6 hours as needed for dizziness       nitrofurantoin (macrocrystal-monohydrate) 100 MG capsule    MACROBID     Take 100 mg by mouth       omeprazole 20 MG CR capsule    priLOSEC     Take 20 mg by mouth       oxyCODONE 5 MG IR tablet    ROXICODONE    20 tablet    Take 0.5-1 tablets (2.5-5 mg) by mouth every 6 hours as needed for moderate to severe pain       PA FISH OIL 1000 MG Caps      Take 300 mg by mouth       valACYclovir 1000 mg tablet    VALTREX     TAKE 1 TABLET BY MOUTH 2 TIMES DAILY. FOR 5 DAYS       vitamin D3 1000 UNITS Caps      Take 1,000 Units by mouth       * Notice:  This list has 2 medication(s) that are the same as other medications prescribed for you. Read the directions carefully, and ask your doctor or other care provider to review them with you.

## 2017-05-30 ENCOUNTER — HOSPITAL ENCOUNTER (OUTPATIENT)
Dept: ULTRASOUND IMAGING | Facility: CLINIC | Age: 37
Discharge: HOME | End: 2017-05-30
Attending: INTERNAL MEDICINE | Admitting: INTERNAL MEDICINE
Payer: COMMERCIAL

## 2017-05-30 DIAGNOSIS — I82.409 DVT (DEEP VENOUS THROMBOSIS) (H): ICD-10-CM

## 2017-05-30 PROCEDURE — 93970 EXTREMITY STUDY: CPT

## 2017-05-31 NOTE — PROGRESS NOTES
See letter to PCP.   Greater than one half the 90 minutes total spent on the pt's visit were spent providing education and counselling to the patient regarding the above matters.

## 2017-05-31 NOTE — PROGRESS NOTES
May 26, 2017         Michael Osman    [address] [place] [address]       Dear Michael:  I saw your patient, MsKeya Alanis at the [facility] on 2017 to go over her recent ER visit for left upper extremity DVT.  The patient is a 37-year-old white female who presented to the emergency room on 2017 complaining of arm pain after she had been seen in the ER on 2017 for dizziness.  At that time of the 2017 visit, she had an IV placed in her left upper extremity and reports that as the IV was being placed, it was quite painful.  She then started to develop left axillary and arm pain, swelling and left hand numbness.  She went to the emergency room on 2017 as the pain was unbearable.  In the ER on that date, the patient was seen and had a left upper extremity venous duplex undertaken which was positive for brachial and basilic vein thrombosis.  The patient was given appropriate treatment for this in the form of Pradaxa with a Lovenox load.  She is currently taking Pradaxa 150 mg p.o. b.i.d.  She has persistent pain and swelling in the arm and presents to my office today seeking resolution for the above.        PAST MEDICAL HISTORY:  Notable for recent breast augmentation and abdominoplasty.  This, however, was several weeks to months prior to her original presentation.        Her previous medical history is also notable for the followin.  Depression.   2.  HSV infection.   3.  Deep venous thrombosis as delineated above.      PAST SURGICAL HISTORY:  As listed above.      CURRENT MEDICATIONS:  Include the followin.  Biotin 10 mg daily.   2.  Wellbutrin  mg daily.   3.  Vitamin D3 at 1000 units daily.   4.  Clindamycin cream 2% daily p.r.n.   5.  Flexeril 10 mg p.o. t.i.d. p.r.n.   6.  Pristiq 100 mg daily.   7.  Culturelle daily.   8.  Vyvanse 10 mg daily.   9.  Macrobid 100 mg p.o. b.i.d.   10.   Omega-3 fatty acid over-the-counter.   11.  Prilosec 20 mg daily.   12.  Valtrex 1 gram p.o. b.i.d. p.r.n. for 5 days.   13.  Pradaxa 150 mg p.o. b.i.d.   14.  Oxycodone 2.5 to 5 mg p.o. q.6 h. p.r.n.      ALLERGIES:  Listed to codeine, Demerol, sulfa drugs and Flagyl.  Reactions for the first 3 are unknown.  Reaction to Flagyl is that of a rash.      FAMILY HISTORY:  Noncontributory for DVT.      SOCIAL HISTORY:  Notable for the fact that the patient is .  She works at [employer].  She is a lifelong nonsmoker.        PHYSICAL EXAMINATION:   VITAL SIGNS:  Currently blood pressure is 107/79, heart rate is 98 and regular, pulse ox 100 percent, respiratory rate is 12.   HEENT:  Oropharynx within normal limits.   NECK:  No JVD, thyromegaly, lymphadenopathy.   LUNGS:  Clear to auscultation bilaterally without rales, wheezes or rhonchi.   HEART:  Regular rate and rhythm, normal S1, S2, no S3, S4, murmur, gallop or rub.   ABDOMEN:  Normoactive bowel sounds, obese, soft, nondistended, nontender.   EXTREMITIES:  Without cyanosis, clubbing or edema.  The examination of the upper extremities reveals that the arms are symmetrical at present.      IMPRESSION:       1.  Left upper extremity cephalic and basilic vein deep vein thrombosis, likely traumatic in association with peripheral IV insertion.       2.  Extreme patient pain and discomfort in association with the above.      DISCUSSION:  Firstly, I had a lengthy discussion with the patient and her , both of whom asked multiple questions simultaneously as to the various etiologies of upper extremity deep vein thrombosis.  Although the patient does engage in activities at [employer] wherein she has to place her hands up above her head, I feel that the likelihood that this is a venous thoracic outlet syndrome is extremely low given the fact that she does not have axillary or subclavian vein deep vein thrombosis.  Rather, this is almost assuredly secondary to trauma  associated with peripheral IV insertion at her ER visit in 2017.  I advised the patient that she is likely to have a stable clinical course.  I explained to the patient and her  multiple times that the risk of pulmonary embolism from upper extremity deep vein thrombosis is quite low.  Furthermore, I advised the patient that I do not feel that she requires a thrombophilia evaluation as this was almost surely provoked by the above.  I did advise the patient given her complaints of pain out of proportion to physical exam findings, to have repeat duplex undertaken so as to document absence of proximal encroachment.  I am happy to report that this study was performed on 2017 and revealed complete absence of her deep vein thrombosis.  I would recommend that she be anticoagulated with a course of Pradaxa for 3 months.  This recommendation is based upon the significant discomfort that the patient is reporting presently.  I will see the patient back at that time and consider repeating imaging and/or D-dimer testing at that time to hopefully get the patient off Pradaxa.        Please do not hesitate to contact me if I may be of assistance regarding this or other matters moving forward in the future.  Of note, I did spend 95 minutes on today's office visit.  The patient and her  asked multiple questions on multiple occasions, sometimes repetitively.  I advised the patient and her  to the best of my abilities that I think that she will have a good outcome and they seemed to leave the office satisfied.      Sincerely,         GERRI PUCKETT MD             D: 2017 14:10   T: 2017 14:46   MT: [email or web address]      Name:     MITZI KNOWLES   MRN:      [id number]        Account:      [id number]   :      1980           Visit Date:   2017      Document: [id number]       cc: Michael Abbott MD

## 2017-06-01 ENCOUNTER — TELEPHONE (OUTPATIENT)
Dept: OTHER | Facility: CLINIC | Age: 37
End: 2017-06-01

## 2017-06-23 ENCOUNTER — TELEPHONE (OUTPATIENT)
Dept: OTHER | Facility: CLINIC | Age: 37
End: 2017-06-23

## 2017-06-23 ENCOUNTER — HOSPITAL ENCOUNTER (EMERGENCY)
Facility: CLINIC | Age: 37
Discharge: HOME | End: 2017-06-23
Attending: EMERGENCY MEDICINE | Admitting: EMERGENCY MEDICINE
Payer: COMMERCIAL

## 2017-06-23 ENCOUNTER — APPOINTMENT (OUTPATIENT)
Dept: ULTRASOUND IMAGING | Facility: CLINIC | Age: 37
End: 2017-06-23
Attending: EMERGENCY MEDICINE
Payer: COMMERCIAL

## 2017-06-23 VITALS
HEART RATE: 94 BPM | HEIGHT: 64 IN | SYSTOLIC BLOOD PRESSURE: 120 MMHG | BODY MASS INDEX: 32.1 KG/M2 | TEMPERATURE: 98.5 F | WEIGHT: 188 LBS | RESPIRATION RATE: 18 BRPM | DIASTOLIC BLOOD PRESSURE: 80 MMHG | OXYGEN SATURATION: 96 %

## 2017-06-23 DIAGNOSIS — M79.602 PAIN OF LEFT UPPER EXTREMITY: ICD-10-CM

## 2017-06-23 DIAGNOSIS — G56.02 CARPAL TUNNEL SYNDROME OF LEFT WRIST: ICD-10-CM

## 2017-06-23 PROCEDURE — 99284 EMERGENCY DEPT VISIT MOD MDM: CPT | Mod: 25

## 2017-06-23 PROCEDURE — 93971 EXTREMITY STUDY: CPT | Mod: LT

## 2017-06-23 ASSESSMENT — ENCOUNTER SYMPTOMS
CHILLS: 0
FEVER: 0
PALPITATIONS: 0
NUMBNESS: 1
SHORTNESS OF BREATH: 0

## 2017-06-23 NOTE — DISCHARGE INSTRUCTIONS
Carpal Tunnel Syndrome    Carpal tunnel syndrome is a painful condition of the wrist and arm. It is caused by pressure on the median nerve.  The median nerve is one of the nerves that give feeling and movement to the hand. It passes through a tunnel in the wrist called the carpal tunnel. This tunnel is made up of bones and ligaments. Narrowing of this tunnel or swelling of the tissues inside the tunnel puts pressure on the median nerve. This causes numbness, pins and needles, or electric shooting pains in your hand and forearm. Often the pain is worse at night and may wake you when you are asleep.  Carpal tunnel syndrome may occur during pregnancy and with use of birth control pills. It is more common in workers who must often bend their wrists. It is also common in people who work with power tools that cause strong vibrations.  Home care    Rest the painful wrist. Avoid repeated bending of the wrist back and forth. This puts pressure on the median nerve. Avoid using power tools with strong vibrations.    If you were given a splint, wear it at night while you sleep. You may also wear it during the day for comfort.    Move your fingers and wrists often to avoid stiffness.    Elevate your arms on pillows when you lie down.    Try using the unaffected hand more.    Try not to hold your wrists in a bent, downward position.    Sometimes changes in the work place may ease symptoms. If you type most of the day, it may help to change the position of your keyboard or add a wrist support. Your wrist should be in a neutral position and not bent back when typing.    You may use over-the-counter pain medicine to treat pain and inflammation, unless another medicine was prescribed. Anti-inflammatory pain medicines, such as ibuprofen or naproxen may be more effective than acetaminophen, which treats pain, but not inflammation. If you have chronic liver or kidney disease or ever had a stomach ulcer or GI bleeding, talk with your  doctor before using these medicines.    Opioid pain medicine will only give temporary relief and does not treat the problem. If pain continues, you may need a shot of a steroid drug into your wrist.    If the above methods fail, you may need surgery. This will open the carpal tunnel and release the pressure on the trapped nerve.  Follow-up care  Follow up with your healthcare provider, or as advised, if the pain doesn t begin to improve within the next week.  If X-rays were taken, you will be notified of any new findings that may affect your care.  When to seek medical advice  Call your healthcare provider right away if any of these occur:    Pain not improving with the above treatment    Fingers or hand become cold, blue, numb, or tingly    Your whole arm becomes swollen or weak  Date Last Reviewed: 11/23/2015 2000-2017 [employer] [address] [place] [address], [place] [address]. All rights reserved. This information is not intended as a substitute for professional medical care. Always follow your healthcare professional's instructions.

## 2017-06-23 NOTE — ED AVS SNAPSHOT
Emergency Department    [address] [place] [id number]    Phone:  [phone]    Fax:  [phone]                                       Aga Alanis   MRN: [id number]    Department:   Emergency Department   Date of Visit:  6/23/2017           After Visit Summary Signature Page     I have received my discharge instructions, and my questions have been answered. I have discussed any challenges I see with this plan with the nurse or doctor.    ..........................................................................................................................................  Patient/Patient Representative Signature      ..........................................................................................................................................  Patient Representative Print Name and Relationship to Patient    ..................................................               ................................................  Date                                            Time    ..........................................................................................................................................  Reviewed by Signature/Title    ...................................................              ..............................................  Date                                                            Time

## 2017-06-23 NOTE — ED PROVIDER NOTES
History     Chief Complaint:  Arm pain    HPI   Aga Alanis is a 37 year old female who was seen here last month for left arm DVT for which she was placed on Pradaxa. Repeat ultrasound three weeks ago showed resolution of DVT. However, she reports recurrence last night of left upper arm pain and numbness/tingling to her left fingers, which is reminiscent of previous DVT pains. The numbness/tingling is worse with ranging of her wrist. Initial DVT was thought to be related to peripheral IV established during previous ED visit. The patient denies any recent injury/trauma/falls, chest pain, shortness of breath, palpitations, leg pain or swelling, neck pain, or any other acute symptoms.       Allergies:  Codeine Sulfate  Demerol  Sulfa Drugs  Flagyl [Metronidazole] (rash)     Medications:    dabigatran ANTICOAGULANT (PRADAXA) 150 MG CAPS capsule  buPROPion (WELLBUTRIN XL) 150 MG 24 hr tablet  Cholecalciferol (VITAMIN D3) 1000 UNITS CAPS  desvenlafaxine succinate (PRISTIQ) 100 MG 24 hr tablet  Omega-3 Fatty Acids (PA FISH OIL) 1000 MG CAPS  omeprazole (PRILOSEC) 20 MG CR capsule  Flexeril     Past Medical History:    DVT, left arm   Ovarian cyst   Carpal tunnel syndrome     Past Surgical History:    ENT surgery  Right knee surgery  Tubal ligation and ovarian cyst surgery  Breast augmentation    Family History:    History reviewed. No pertinent family history.      Social History:  Non-smoker. Consumes alcohol.  Marital Status:   [2]      Review of Systems   Constitutional: Negative for chills and fever.   Respiratory: Negative for shortness of breath.    Cardiovascular: Negative for chest pain, palpitations and leg swelling.        Left arm pain, see HPI   Neurological: Positive for numbness (see HPI).   All other systems reviewed and are negative.      Physical Exam     Patient Vitals for the past 24 hrs:   BP Temp Temp src Pulse Resp SpO2 Height Weight   06/23/17 1406 - - - 94 18 96 % - -   06/23/17  "1247 120/80 98.5  F (36.9  C) Oral 100 20 100 % 1.626 m (5' 4\") 85.3 kg (188 lb)      Physical Exam  General: Patient is alert and normal appearing.  HEENT: Head atraumatic    Eyes: pupils equal and reactive. Conjunctiva clear   Nares: patent   Oropharynx: no lesions, uvula midline, no palatal draping, normal voice, no trismus  Neck: Supple without lymphadenopathy, no meningismus  Chest: Heart regular rate and rhythm.   Lungs: Equal clear to auscultation with no wheeze or rales  Abdomen: Soft, non tender, nondistended, normal bowel sounds  Back: No costovertebral angle tenderness, no midline C, T or L spine tenderness  Neuro: Grossly nonfocal, normal speech, strength equal bilaterally, CN 2-12 intact  Extremities: No deformities, equal radial and DP pulses. No clubbing, cyanosis.  No edema, LUE no edema, warmth, compartments soft, warm and well perfused down to fingers.  Positive phalen and tinnel test.  No palpable axillary lymphadenopathy.  Skin: Warm and dry with no rash.       Emergency Department Course   Imaging:  Radiographic findings were communicated with the patient who voiced understanding of the findings.  US Venous Duplex Left arm: No evidence for deep venous thrombosis in the left upper Extremity. Results per Radiology.     Emergency Department Course:  Past medical records, nursing notes, and vitals reviewed.  1306: I performed an exam of the patient as documented above. The above imaging study was obtained.   1350: I rechecked the patient.  Findings and plan explained to the patient. Patient discharged home with instructions regarding supportive care, medications, and reasons to return. The importance of close follow-up was reviewed.      Impression & Plan    Medical Decision Makin year old female presents to the ED with left upper extremity pain reminiscent of recent DVT. She has had an ultrasound that showed the DVT cleared. They though it was related to a peripheral IV that she had when " being seen in the ER prior to that. She is following up with Dr. Bhandari for that. She returns today with no new injury or immobilization and no new trauma to the arm. No palpable deformity. She has no ecchymoses, erythema, warmth, or axillary lymphadenopathy. Full range of motion at her shoulder, elbow, and wrist. No cervical tenderness and I do not suspect radiculopathy. She does describe some tingling into her hand and has increased tingling with Phalen and Tinel testing. She has a remote history of carpal tunnel syndrome and I think that this is likely a recurrence of her carpal tunnel syndrome. Recommended rest, ice, elevation, night splints, follow up with Orthopedics and return precautions to the ER reviewed at length. I do not suspect ACS, pulmonary embolism, or other referred pain to the arm. Compartments are soft and there is no evidence of septic joint to cause her symptoms.    Critical Care time:  none    Diagnosis:    ICD-10-CM    1. Pain of left upper extremity M79.602    2. Carpal tunnel syndrome of left wrist G56.02        Disposition:  discharged to home    Cale Rothman  6/23/2017    EMERGENCY DEPARTMENT  I, Cale Rothman, am serving as a scribe at 1:07 PM on 6/23/2017 to document services personally performed by Hailey Ly MD based on my observations and the provider's statements to me.          Hailey Ly MD  06/24/17 0854

## 2017-06-23 NOTE — ED AVS SNAPSHOT
Emergency Department    [address] [facility] [address]    Phone:  [phone]    Fax:  [phone]                                       Aga Alanis   MRN: [id number]    Department:   Emergency Department   Date of Visit:  6/23/2017           Patient Information     Date Of Birth          1980        Your diagnoses for this visit were:     Pain of left upper extremity     Carpal tunnel syndrome of left wrist        You were seen by Hailey Ly MD.      Follow-up Information     Follow up with Michael Abbott.    Specialty:  Family Practice    Contact information:    [facility]  [address] [facility] [id number] [phone]          Schedule an appointment as soon as possible for a visit with Orthopaedics, [facility].    Contact information:    [address] [id number] [phone]          Follow up with  Emergency Department.    Specialty:  EMERGENCY MEDICINE    Why:  If symptoms worsen    Contact information:    [address] [facility] [address] [phone]        Discharge Instructions         Carpal Tunnel Syndrome    Carpal tunnel syndrome is a painful condition of the wrist and arm. It is caused by pressure on the median nerve.  The median nerve is one of the nerves that give feeling and movement to the hand. It passes through a tunnel in the wrist called the carpal tunnel. This tunnel is made up of bones and ligaments. Narrowing of this tunnel or swelling of the tissues inside the tunnel puts pressure on the median nerve. This causes numbness, pins and needles, or electric shooting pains in your hand and forearm. Often the pain is worse at night and may wake you when you are asleep.  Carpal tunnel syndrome may occur during pregnancy and with use of birth control pills. It is more common in workers who must often bend their wrists. It is also common in people who work with power tools that cause strong  vibrations.  Home care    Rest the painful wrist. Avoid repeated bending of the wrist back and forth. This puts pressure on the median nerve. Avoid using power tools with strong vibrations.    If you were given a splint, wear it at night while you sleep. You may also wear it during the day for comfort.    Move your fingers and wrists often to avoid stiffness.    Elevate your arms on pillows when you lie down.    Try using the unaffected hand more.    Try not to hold your wrists in a bent, downward position.    Sometimes changes in the work place may ease symptoms. If you type most of the day, it may help to change the position of your keyboard or add a wrist support. Your wrist should be in a neutral position and not bent back when typing.    You may use over-the-counter pain medicine to treat pain and inflammation, unless another medicine was prescribed. Anti-inflammatory pain medicines, such as ibuprofen or naproxen may be more effective than acetaminophen, which treats pain, but not inflammation. If you have chronic liver or kidney disease or ever had a stomach ulcer or GI bleeding, talk with your doctor before using these medicines.    Opioid pain medicine will only give temporary relief and does not treat the problem. If pain continues, you may need a shot of a steroid drug into your wrist.    If the above methods fail, you may need surgery. This will open the carpal tunnel and release the pressure on the trapped nerve.  Follow-up care  Follow up with your healthcare provider, or as advised, if the pain doesn t begin to improve within the next week.  If X-rays were taken, you will be notified of any new findings that may affect your care.  When to seek medical advice  Call your healthcare provider right away if any of these occur:    Pain not improving with the above treatment    Fingers or hand become cold, blue, numb, or tingly    Your whole arm becomes swollen or weak  Date Last Reviewed: 11/23/2015     3051-6496 [employer] [address] [place] [address], [place] [address]. All rights reserved. This information is not intended as a substitute for professional medical care. Always follow your healthcare professional's instructions.          24 Hour Appointment Hotline       To make an appointment at any [facility], call [phone] ([phone]). If you don't have a family doctor or clinic, we will help you find one. [facility] are conveniently located to serve the needs of you and your family.             Review of your medicines      Our records show that you are taking the medicines listed below. If these are incorrect, please call your family doctor or clinic.        Dose / Directions Last dose taken    Biotin 10 MG Tabs tablet   Dose:  10 mg        Take 10 mg by mouth   Refills:  0        buPROPion 150 MG 24 hr tablet   Commonly known as:  WELLBUTRIN XL   Dose:  150 mg        Take 150 mg by mouth   Refills:  0        clindamycin 2 % cream   Commonly known as:  CLEOCIN        one time if needed.   Refills:  0        cyclobenzaprine 10 MG tablet   Commonly known as:  FLEXERIL        3 times daily if needed.   Refills:  0        * dabigatran ANTICOAGULANT 150 MG capsule   Commonly known as:  PRADAXA   Quantity:  60 capsule        Take 1 capsule (150 mg) by mouth twice daily. Store in original 's bottle or blister pack; use within 120 days of opening.   Refills:  5        * dabigatran ANTICOAGULANT 150 MG capsule   Commonly known as:  PRADAXA        Take 1 capsule (150 mg) by mouth twice daily. Store in original 's bottle or blister pack; use within 120 days of opening.   Refills:  0        desvenlafaxine succinate 100 MG 24 hr tablet   Commonly known as:  PRISTIQ   Dose:  100 mg        Take 100 mg by mouth   Refills:  0        HYDROcodone-acetaminophen 5-325 MG per tablet   Commonly known as:  NORCO   Dose:  1-2 tablet   Quantity:  15 tablet        Take 1-2 tablets  by mouth every 4 hours as needed for pain for 5 doses.   Refills:  0        ibuprofen 600 MG tablet   Commonly known as:  ADVIL/MOTRIN   Dose:  600 mg   Quantity:  30 tablet        Take 1 tablet by mouth every 6 hours as needed for pain.   Refills:  1        lactobacillus rhamnosus (GG) capsule   Dose:  1 capsule        Take 1 capsule by mouth   Refills:  0        lisdexamfetamine dimesylate 10 MG capsule   Commonly known as:  VYVANSE   Dose:  10 mg        Take 10 mg by mouth   Refills:  0        meclizine 25 MG tablet   Commonly known as:  ANTIVERT   Dose:  25 mg   Quantity:  20 tablet        Take 1 tablet (25 mg) by mouth every 6 hours as needed for dizziness   Refills:  0        nitrofurantoin (macrocrystal-monohydrate) 100 MG capsule   Commonly known as:  MACROBID   Dose:  100 mg        Take 100 mg by mouth   Refills:  0        omeprazole 20 MG CR capsule   Commonly known as:  priLOSEC   Dose:  20 mg        Take 20 mg by mouth   Refills:  0        oxyCODONE 5 MG IR tablet   Commonly known as:  ROXICODONE   Dose:  2.5-5 mg   Quantity:  20 tablet        Take 0.5-1 tablets (2.5-5 mg) by mouth every 6 hours as needed for moderate to severe pain   Refills:  0        PA FISH OIL 1000 MG Caps   Dose:  300 mg        Take 300 mg by mouth   Refills:  0        valACYclovir 1000 mg tablet   Commonly known as:  VALTREX        TAKE 1 TABLET BY MOUTH 2 TIMES DAILY. FOR 5 DAYS   Refills:  0        vitamin D3 1000 UNITS Caps   Dose:  1000 Units        Take 1,000 Units by mouth   Refills:  0        * Notice:  This list has 2 medication(s) that are the same as other medications prescribed for you. Read the directions carefully, and ask your doctor or other care provider to review them with you.            Procedures and tests performed during your visit     Arm, left, venous US      Orders Needing Specimen Collection     None      Pending Results     No orders found from 6/21/2017 to 6/24/2017.            Pending Culture Results      No orders found from 6/21/2017 to 6/24/2017.            Pending Results Instructions     If you had any lab results that were not finalized at the time of your Discharge, you can call the ED Lab Result RN at [phone]. You will be contacted by this team for any positive Lab results or changes in treatment. The nurses are available 7 days a week from 10A to 6:30P.  You can leave a message 24 hours per day and they will return your call.        Test Results From Your Hospital Stay        6/23/2017  1:51 PM      Narrative     US UPPER EXTREMITY VENOUS DUPLEX LEFT    6/23/2017 1:45 PM     HISTORY: Left arm pain and swelling.    TECHNIQUE: Spectral Doppler and waveform analysis were performed on  the left upper extremity veins.     COMPARISON: 5/8/2017.    FINDINGS: The left internal jugular, subclavian, axillary, and  brachial veins are patent and negative for deep venous thrombosis. The  left basilic and cephalic veins also appear patent and negative for  thrombus. Previously described thrombus within the left brachial and  basilic veins is not seen on today's exam.        Impression     IMPRESSION: No evidence for deep venous thrombosis in the left upper  extremity.    HIRO SOLANO MD                Clinical Quality Measure: Blood Pressure Screening     Your blood pressure was checked while you were in the emergency department today. The last reading we obtained was  BP: 120/80 . Please read the guidelines below about what these numbers mean and what you should do about them.  If your systolic blood pressure (the top number) is less than 120 and your diastolic blood pressure (the bottom number) is less than 80, then your blood pressure is normal. There is nothing more that you need to do about it.  If your systolic blood pressure (the top number) is 120-139 or your diastolic blood pressure (the bottom number) is 80-89, your blood pressure may be higher than it should be. You should have your blood pressure  "rechecked within a year by a primary care provider.  If your systolic blood pressure (the top number) is 140 or greater or your diastolic blood pressure (the bottom number) is 90 or greater, you may have high blood pressure. High blood pressure is treatable, but if left untreated over time it can put you at risk for heart attack, stroke, or kidney failure. You should have your blood pressure rechecked by a primary care provider within the next 4 weeks.  If your provider in the emergency department today gave you specific instructions to follow-up with your doctor or provider even sooner than that, you should follow that instruction and not wait for up to 4 weeks for your follow-up visit.        Thank you for choosing [facility]       Thank you for choosing [facility] for your care. Our goal is always to provide you with excellent care. Hearing back from our patients is one way we can continue to improve our services. Please take a few minutes to complete the written survey that you may receive in the mail after you visit with us. Thank you!        [employer] Information     [employer] lets you send messages to your doctor, view your test results, renew your prescriptions, schedule appointments and more. To sign up, go to [email or web address] . Click on \"Log in\" on the left side of the screen, which will take you to the Welcome page. Then click on \"Sign up Now\" on the right side of the page.     You will be asked to enter the access code listed below, as well as some personal information. Please follow the directions to create your username and password.     Your access code is: [email or web address]  Expires: 2017  2:03 PM     Your access code will  in 90 days. If you need help or a new code, please call your [facility] clinic or [phone].        Care EveryWhere ID     This is your Care EveryWhere ID. This could be used by other organizations to access your [facility] medical records  [id number]        Equal Access to " Services     [facility]: Marina Varghese, waadalgisa lunikoleadaha, qakendall mills. So [facility] [phone].    ATENCIÓN: Si habla español, tiene a aviles disposición servicios gratuitos de asistencia lingüística. Llame al [phone].    We comply with applicable federal civil rights laws and [place] laws. We do not discriminate on the basis of race, color, national origin, age, disability sex, sexual orientation or gender identity.            After Visit Summary       This is your record. Keep this with you and show to your community pharmacist(s) and doctor(s) at your next visit.

## 2017-06-23 NOTE — TELEPHONE ENCOUNTER
Patient states that she is having pain under her arm pit in the same location that she previously had DVT.  Patient states that it started late last night, has been constant, and has not subsided.  She states that its also painful to touch.  Patient was advised to have US in ER to rule out DVT and to call clinic back for follow up if she does.  She states understanding.  Kerri Oscar, RN, BSN

## 2017-06-26 DIAGNOSIS — I82.409 DVT (DEEP VENOUS THROMBOSIS) (H): Primary | ICD-10-CM

## 2017-08-08 ENCOUNTER — HOSPITAL ENCOUNTER (OUTPATIENT)
Dept: ULTRASOUND IMAGING | Facility: CLINIC | Age: 37
Discharge: HOME | End: 2017-08-08
Attending: INTERNAL MEDICINE | Admitting: INTERNAL MEDICINE
Payer: COMMERCIAL

## 2017-08-08 DIAGNOSIS — I82.622 ACUTE DEEP VEIN THROMBOSIS (DVT) OF BRACHIAL VEIN OF LEFT UPPER EXTREMITY (H): ICD-10-CM

## 2017-08-08 DIAGNOSIS — I82.409 DVT (DEEP VENOUS THROMBOSIS) (H): ICD-10-CM

## 2017-08-08 LAB — D DIMER PPP FEU-MCNC: 0.4 UG/ML FEU (ref 0–0.5)

## 2017-08-08 PROCEDURE — 85379 FIBRIN DEGRADATION QUANT: CPT | Performed by: INTERNAL MEDICINE

## 2017-08-08 PROCEDURE — 36415 COLL VENOUS BLD VENIPUNCTURE: CPT | Performed by: INTERNAL MEDICINE

## 2017-08-08 PROCEDURE — 93971 EXTREMITY STUDY: CPT | Mod: LT

## 2017-08-09 ENCOUNTER — TELEPHONE (OUTPATIENT)
Dept: OTHER | Facility: CLINIC | Age: 37
End: 2017-08-09

## 2017-08-09 DIAGNOSIS — I82.409 DVT (DEEP VENOUS THROMBOSIS) (H): Primary | ICD-10-CM

## 2017-08-09 NOTE — TELEPHONE ENCOUNTER
Patient states understanding of the below and to discuss heavy menstrual cycles with LAST Oscar RN, BSN

## 2017-08-09 NOTE — TELEPHONE ENCOUNTER
Pt may stop Pradaxa now. Repeat d dimer in one one month off of Pradaxa. Please order and send to me for cosign.If d dimer goes up off of Pradaxa, we may consider restarting Pradaxa.

## 2017-08-09 NOTE — TELEPHONE ENCOUNTER
Patient aware of result and need for ordered d dimer in one month.    She would like know if she can start her birth control pills now  Please advise  Kerri Oscar, RN, BSN

## 2017-08-09 NOTE — TELEPHONE ENCOUNTER
Patient would like to know results of US and D dimer and if she needs to continue AC.  She is going on vacation soon.  Please advise  Kerri Oscar, RN, BSN

## 2017-09-06 ENCOUNTER — OFFICE VISIT (OUTPATIENT)
Dept: OTHER | Facility: CLINIC | Age: 37
End: 2017-09-06
Attending: INTERNAL MEDICINE
Payer: COMMERCIAL

## 2017-09-06 VITALS
BODY MASS INDEX: 35.2 KG/M2 | HEIGHT: 64 IN | HEART RATE: 97 BPM | SYSTOLIC BLOOD PRESSURE: 123 MMHG | OXYGEN SATURATION: 98 % | DIASTOLIC BLOOD PRESSURE: 81 MMHG | WEIGHT: 206.2 LBS

## 2017-09-06 DIAGNOSIS — I82.622 ACUTE DEEP VEIN THROMBOSIS (DVT) OF BRACHIAL VEIN OF LEFT UPPER EXTREMITY (H): ICD-10-CM

## 2017-09-06 DIAGNOSIS — I82.409 DVT (DEEP VENOUS THROMBOSIS) (H): ICD-10-CM

## 2017-09-06 LAB — D DIMER PPP FEU-MCNC: 0.6 UG/ML FEU (ref 0–0.5)

## 2017-09-06 PROCEDURE — 36415 COLL VENOUS BLD VENIPUNCTURE: CPT | Performed by: INTERNAL MEDICINE

## 2017-09-06 PROCEDURE — 99211 OFF/OP EST MAY X REQ PHY/QHP: CPT

## 2017-09-06 PROCEDURE — 99213 OFFICE O/P EST LOW 20 MIN: CPT | Mod: ZP | Performed by: INTERNAL MEDICINE

## 2017-09-06 PROCEDURE — 85379 FIBRIN DEGRADATION QUANT: CPT | Performed by: INTERNAL MEDICINE

## 2017-09-06 RX ORDER — SPIRONOLACTONE 25 MG/1
100 TABLET ORAL DAILY
Status: ON HOLD | COMMUNITY
End: 2018-12-29

## 2017-09-06 NOTE — PROGRESS NOTES
Aga Alanis is a 37 year old female who is presenting at the current time to discuss her diagnosi(es) of a LUE DVT in the past.      Review Of Systems  Skin: negative  Eyes: negative  Ears/Nose/Throat: negative  Respiratory: No shortness of breath, dyspnea on exertion, cough, or hemoptysis  Cardiovascular: negative  Gastrointestinal: negative  Genitourinary: negative  Musculoskeletal: negative  Neurologic: negative  Psychiatric: negative  Hematologic/Lymphatic/Immunologic: negative  Endocrine: negative    PAST MEDICAL HISTORY:                  Past Medical History:   Diagnosis Date     DVT (deep venous thrombosis) (H)      Ovarian cyst        PAST SURGICAL HISTORY:                  Past Surgical History:   Procedure Laterality Date     COSMETIC SURGERY      breast augmentation and tummy tuck 2017     ENT SURGERY      tubes      GYN SURGERY      ovarian cyst , tubes tied      ORTHOPEDIC SURGERY      knee right        CURRENT MEDICATIONS:                  Current Outpatient Prescriptions   Medication Sig Dispense Refill     spironolactone (ALDACTONE) 25 MG tablet Take by mouth daily       Biotin 10 MG TABS tablet Take 10 mg by mouth       buPROPion (WELLBUTRIN XL) 150 MG 24 hr tablet Take 150 mg by mouth       Cholecalciferol (VITAMIN D3) 1000 UNITS CAPS Take 1,000 Units by mouth       clindamycin (CLEOCIN) 2 % cream one time if needed.       cyclobenzaprine (FLEXERIL) 10 MG tablet 3 times daily if needed.       desvenlafaxine succinate (PRISTIQ) 100 MG 24 hr tablet Take 100 mg by mouth       lactobacillus rhamnosus, GG, (CULTURELL) capsule Take 1 capsule by mouth       lisdexamfetamine dimesylate (VYVANSE) 10 MG capsule Take 10 mg by mouth       nitrofurantoin, macrocrystal-monohydrate, (MACROBID) 100 MG capsule Take 100 mg by mouth       Omega-3 Fatty Acids (PA FISH OIL) 1000 MG CAPS Take 300 mg by mouth       omeprazole (PRILOSEC) 20 MG CR capsule Take 20 mg by mouth       valACYclovir (VALTREX) 1000 mg  tablet TAKE 1 TABLET BY MOUTH 2 TIMES DAILY. FOR 5 DAYS       oxyCODONE (ROXICODONE) 5 MG IR tablet Take 0.5-1 tablets (2.5-5 mg) by mouth every 6 hours as needed for moderate to severe pain 20 tablet 0     meclizine (ANTIVERT) 25 MG tablet Take 1 tablet (25 mg) by mouth every 6 hours as needed for dizziness 20 tablet 0     ibuprofen (ADVIL,MOTRIN) 600 MG tablet Take 1 tablet by mouth every 6 hours as needed for pain. 30 tablet 1     HYDROcodone-acetaminophen 5-325 MG per tablet Take 1-2 tablets by mouth every 4 hours as needed for pain for 5 doses. 15 tablet 0       ALLERGIES:                  Allergies   Allergen Reactions     Codeine Sulfate      Demerol      Sulfa Drugs      Flagyl [Metronidazole] Rash       SOCIAL HISTORY:                  Social History     Social History     Marital status:      Spouse name: N/A     Number of children: N/A     Years of education: N/A     Occupational History     Not on file.     Social History Main Topics     Smoking status: Never Smoker     Smokeless tobacco: Never Used     Alcohol use Yes     Drug use: No     Sexual activity: Not on file     Other Topics Concern     Not on file     Social History Narrative       FAMILY HISTORY:                 History reviewed. No pertinent family history.         Physical exam was not performed as it was not indicated.    A/P:    (I82.622) Acute deep vein thrombosis (DVT) of brachial vein of left upper extremity, resolved as of 8-8-17 after three months therpeutic antiocagualtion with Pradaxa  Comment: this is resolved  Plan: await d dimer, which was drawn today, if significantly elevated, consider resumption of therapeutic AC; her DVT was provoked in association with traumatic peripheral IV insertion.    Greater than one half the fifteen minutes total spent on the pt's visit were spent providing education and counselling to the patient regarding the above matters.

## 2017-09-06 NOTE — MR AVS SNAPSHOT
"              After Visit Summary   9/6/2017    Aga Alanis    MRN: [id number]           Patient Information     Date Of Birth          1980        Visit Information        Provider Department      9/6/2017 2:00 PM John Bhandari MD [facility] [place] [facility] Center      Today's Diagnoses     Acute deep vein thrombosis (DVT) of brachial vein of left upper extremity, resolved as of 8-8-17 after three months therpeutic antiocagualtion with Pradaxa           Follow-ups after your visit        Who to contact     If you have questions or need follow up information about today's clinic visit or your schedule please contact [facility] directly at [phone].  Normal or non-critical lab and imaging results will be communicated to you by Pannahart, letter or phone within 4 business days after the clinic has received the results. If you do not hear from us within 7 days, please contact the clinic through Pannahart or phone. If you have a critical or abnormal lab result, we will notify you by phone as soon as possible.  Submit refill requests through [employer] or call your pharmacy and they will forward the refill request to us. Please allow 3 business days for your refill to be completed.          Additional Information About Your Visit        MyChart Information     [employer] lets you send messages to your doctor, view your test results, renew your prescriptions, schedule appointments and more. To sign up, go to [email or web address] . Click on \"Log in\" on the left side of the screen, which will take you to the Welcome page. Then click on \"Sign up Now\" on the right side of the page.     You will be asked to enter the access code listed below, as well as some personal information. Please follow the directions to create your username and password.     Your access code is: [email or web address]  Expires: 12/5/2017  2:28 PM     Your access code " "will  in 90 days. If you need help or a new code, please call your [place] clinic or [phone].        Care EveryWhere ID     This is your Care EveryWhere ID. This could be used by other organizations to access your [place] medical records  [id number]        Your Vitals Were     Pulse Height Pulse Oximetry BMI (Body Mass Index)          97 5' 4\" (1.626 m) 98% 35.39 kg/m2         Blood Pressure from Last 3 Encounters:   17 123/81   17 120/80   17 107/79    Weight from Last 3 Encounters:   17 206 lb 3.2 oz (93.5 kg)   17 188 lb (85.3 kg)   17 192 lb (87.1 kg)              We Performed the Following     Follow-Up with Vascular Medicine          Today's Medication Changes          These changes are accurate as of: 17  2:28 PM.  If you have any questions, ask your nurse or doctor.               Stop taking these medicines if you haven't already. Please contact your care team if you have questions.     dabigatran ANTICOAGULANT 150 MG capsule   Commonly known as:  PRADAXA   Stopped by:  John Bhandari MD                    Primary Care Provider Office Phone # Fax #    Michael Abbott [phone]       [facility] [place] [address]   [place] [address]        Equal Access to Services     [facility] : Hadii shruthi ku hadasho Somervat, waaxda luqadaha, qaybta kaalmada dominick, kendall baez . So [facility] [phone].    ATENCIÓN: Si habla español, tiene a aviles disposición servicios gratuitos de asistencia lingüística. Odalys al [phone].    We comply with applicable federal civil rights laws and [place] laws. We do not discriminate on the basis of race, color, national origin, age, disability sex, sexual orientation or gender identity.            Thank you!     Thank you for choosing [facility] VASC[facility] [place]  for your care. Our goal is always to provide you with excellent care. Hearing back from our " patients is one way we can continue to improve our services. Please take a few minutes to complete the written survey that you may receive in the mail after your visit with us. Thank you!             Your Updated Medication List - Protect others around you: Learn how to safely use, store and throw away your medicines at www.disposemymeds.org.          This list is accurate as of: 9/6/17  2:28 PM.  Always use your most recent med list.                   Brand Name Dispense Instructions for use Diagnosis    ALDACTONE 25 MG tablet   Generic drug:  spironolactone      Take by mouth daily        Biotin 10 MG Tabs tablet      Take 10 mg by mouth    Acute deep vein thrombosis (DVT) of brachial vein of left upper extremity (H)       buPROPion 150 MG 24 hr tablet    WELLBUTRIN XL     Take 150 mg by mouth    Acute deep vein thrombosis (DVT) of brachial vein of left upper extremity (H)       clindamycin 2 % cream    CLEOCIN     one time if needed.    Acute deep vein thrombosis (DVT) of brachial vein of left upper extremity (H)       cyclobenzaprine 10 MG tablet    FLEXERIL     3 times daily if needed.    Acute deep vein thrombosis (DVT) of brachial vein of left upper extremity (H)       desvenlafaxine succinate 100 MG 24 hr tablet    PRISTIQ     Take 100 mg by mouth    Acute deep vein thrombosis (DVT) of brachial vein of left upper extremity (H)       HYDROcodone-acetaminophen 5-325 MG per tablet    NORCO    15 tablet    Take 1-2 tablets by mouth every 4 hours as needed for pain for 5 doses.        ibuprofen 600 MG tablet    ADVIL/MOTRIN    30 tablet    Take 1 tablet by mouth every 6 hours as needed for pain.        lactobacillus rhamnosus (GG) capsule      Take 1 capsule by mouth    Acute deep vein thrombosis (DVT) of brachial vein of left upper extremity (H)       lisdexamfetamine dimesylate 10 MG capsule    VYVANSE     Take 10 mg by mouth    Acute deep vein thrombosis (DVT) of brachial vein of left upper extremity (H)        meclizine 25 MG tablet    ANTIVERT    20 tablet    Take 1 tablet (25 mg) by mouth every 6 hours as needed for dizziness        nitroFURantoin (macrocrystal-monohydrate) 100 MG capsule    MACROBID     Take 100 mg by mouth    Acute deep vein thrombosis (DVT) of brachial vein of left upper extremity (H)       omeprazole 20 MG CR capsule    priLOSEC     Take 20 mg by mouth    Acute deep vein thrombosis (DVT) of brachial vein of left upper extremity (H)       oxyCODONE 5 MG IR tablet    ROXICODONE    20 tablet    Take 0.5-1 tablets (2.5-5 mg) by mouth every 6 hours as needed for moderate to severe pain        PA FISH OIL 1000 MG Caps      Take 300 mg by mouth    Acute deep vein thrombosis (DVT) of brachial vein of left upper extremity (H)       valACYclovir 1000 mg tablet    VALTREX     TAKE 1 TABLET BY MOUTH 2 TIMES DAILY. FOR 5 DAYS    Acute deep vein thrombosis (DVT) of brachial vein of left upper extremity (H)       vitamin D3 1000 UNITS Caps      Take 1,000 Units by mouth    Acute deep vein thrombosis (DVT) of brachial vein of left upper extremity (H)

## 2017-09-06 NOTE — NURSING NOTE
"Chief Complaint   Patient presents with     RECHECK     follow up labs and ultrasound       Initial /81 (BP Location: Right arm, Patient Position: Chair, Cuff Size: Adult Regular)  Pulse 97  Ht 5' 4\" (1.626 m)  Wt 206 lb 3.2 oz (93.5 kg)  SpO2 98%  BMI 35.39 kg/m2 Estimated body mass index is 35.39 kg/(m^2) as calculated from the following:    Height as of this encounter: 5' 4\" (1.626 m).    Weight as of this encounter: 206 lb 3.2 oz (93.5 kg).  Medication Reconciliation: complete    Face to Face time 5 minutes  Jasmyn Hollingsworth CMA      "

## 2017-09-26 ENCOUNTER — TELEPHONE (OUTPATIENT)
Dept: OTHER | Facility: CLINIC | Age: 37
End: 2017-09-26

## 2017-09-26 NOTE — TELEPHONE ENCOUNTER
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  She would like to know if she should make an appointment to discuss this  Please advise  Kerri Oscar RN, BSN

## 2017-09-27 DIAGNOSIS — I82.409 DVT (DEEP VENOUS THROMBOSIS) (H): Primary | ICD-10-CM

## 2017-09-29 ENCOUNTER — OFFICE VISIT (OUTPATIENT)
Dept: OTHER | Facility: CLINIC | Age: 37
End: 2017-09-29
Attending: INTERNAL MEDICINE
Payer: COMMERCIAL

## 2017-09-29 VITALS
BODY MASS INDEX: 34.67 KG/M2 | WEIGHT: 202 LBS | DIASTOLIC BLOOD PRESSURE: 77 MMHG | HEART RATE: 101 BPM | SYSTOLIC BLOOD PRESSURE: 109 MMHG | OXYGEN SATURATION: 98 %

## 2017-09-29 DIAGNOSIS — I82.622 ACUTE DEEP VEIN THROMBOSIS (DVT) OF BRACHIAL VEIN OF LEFT UPPER EXTREMITY (H): Primary | ICD-10-CM

## 2017-09-29 PROCEDURE — 99214 OFFICE O/P EST MOD 30 MIN: CPT | Mod: ZP | Performed by: INTERNAL MEDICINE

## 2017-09-29 PROCEDURE — 99211 OFF/OP EST MAY X REQ PHY/QHP: CPT

## 2017-09-29 NOTE — PROGRESS NOTES
Please see letter to gyn MD. Greater than one half the thirty five minutes total spent on the pt's visit were spent providing education and counselling to the patient regarding the above matters.

## 2017-09-29 NOTE — PROGRESS NOTES
September 29, 2017      Leila Justice MD   [facility]   [address]   [place]  [address]      Dear Colleagues:      Our mutual patient, Ms. Aga Alanis, had previously seen me due to an upper extremity DVT provoked by a traumatic peripheral IV insertion.  She was treated with three months of Pradaxa and then had that discontinued on 09/06/2017.  On monitoring her D-dimer levels off of anticoagulation to ascertain whether or not she should consider reinstitution of anticoagulation, in the interim your office tested her for the Leiden mutation.  She was found to be a heterozygote.  She came to see me to ascertain whether or not she should go back on anticoagulation.  Her upper extremity DVT has been documented to have completely resorbed.      In such situations, the prospective lifetime risk to a Leiden heterozygote of having a recurrent DVT, particularly if the initial DVT was provoked as is the case herein, is quite low.  As such, we do not normally test for this.  However, it certainly makes sense to reinforce the idea that she should not smoke or utilize estrogen base contraception as a former DVT patient who is now known to have a Leiden heterozygous mutation.  I have enclosed a recent article from last week's [place] [employer] regarding thrombophilia testing and venous thrombosis.  I will continue to monitor the patient clinically.  If she would re-present with recurrent thrombosis, than she would require lifelong anticoagulation.  She is also considering undertaking upcoming bone spur surgery in her foot.  In my experience, patients who have had such procedures they are not uncommonly have DVTs in association with an activity.  Now that she is known to be a Leiden heterozygous, I would likely prophylax her for 12 days with Xarelto 10 mg daily postoperatively if she were to undergo bone spur surgery.        Please do not hesitate to contact me if I may be of assistance regarding this  or other matters moving forward in the future.      Sincerely,          GERRI PUCKETT MD             D: 2017 09:05   T: 2017 09:31   MT: BRISA      Name:     MITZI KNOWLES   MRN:      [id number]        Account:      [id number]   :      1980           Visit Date:   2017      Document: [id number]       cc: Leila Up PA-C

## 2017-09-29 NOTE — NURSING NOTE
"Chief Complaint   Patient presents with     RECHECK     follow up labs       Initial /77 (BP Location: Right arm, Patient Position: Chair, Cuff Size: Adult Large)  Pulse 101  Wt 202 lb (91.6 kg)  SpO2 98%  Breastfeeding? No  BMI 34.67 kg/m2 Estimated body mass index is 34.67 kg/(m^2) as calculated from the following:    Height as of 9/6/17: 5' 4\" (1.626 m).    Weight as of this encounter: 202 lb (91.6 kg).  Medication Reconciliation: complete     Face to face nursing time: 8 minutes    Margaret Mcdowell MA     "

## 2017-09-29 NOTE — MR AVS SNAPSHOT
"              After Visit Summary   9/29/2017    Aga Alanis    MRN: [id number]           Patient Information     Date Of Birth          1980        Visit Information        Provider Department      9/29/2017 8:00 AM John Bhandari MD [facility] [place] [facility]  Vascular Center      Today's Diagnoses     Acute deep vein thrombosis (DVT) of brachial vein of left upper extremity, resolved as of 8-8-17 after three months therpeutic antiocagualtion with Pradaxa    -  1       Follow-ups after your visit        Who to contact     If you have questions or need follow up information about today's clinic visit or your schedule please contact [facility] directly at [phone].  Normal or non-critical lab and imaging results will be communicated to you by MyChart, letter or phone within 4 business days after the clinic has received the results. If you do not hear from us within 7 days, please contact the clinic through MyChart or phone. If you have a critical or abnormal lab result, we will notify you by phone as soon as possible.  Submit refill requests through [employer] or call your pharmacy and they will forward the refill request to us. Please allow 3 business days for your refill to be completed.          Additional Information About Your Visit        MyChart Information     [employer] lets you send messages to your doctor, view your test results, renew your prescriptions, schedule appointments and more. To sign up, go to [email or web address] . Click on \"Log in\" on the left side of the screen, which will take you to the Welcome page. Then click on \"Sign up Now\" on the right side of the page.     You will be asked to enter the access code listed below, as well as some personal information. Please follow the directions to create your username and password.     Your access code is: [email or web address]  Expires: 12/5/2017  2:28 PM     Your access " code will  in 90 days. If you need help or a new code, please call your [place] clinic or [phone].        Care EveryWhere ID     This is your Care EveryWhere ID. This could be used by other organizations to access your [place] medical records  [id number]        Your Vitals Were     Pulse Pulse Oximetry Breastfeeding? BMI (Body Mass Index)          101 98% No 34.67 kg/m2         Blood Pressure from Last 3 Encounters:   17 109/77   17 123/81   17 120/80    Weight from Last 3 Encounters:   17 202 lb (91.6 kg)   17 206 lb 3.2 oz (93.5 kg)   17 188 lb (85.3 kg)              Today, you had the following     No orders found for display       Primary Care Provider Office Phone # Fax #    Michael Abbott [phone]       [facility] [place] [address]   [place] [address]        Equal Access to Services     PREETHI AGUIRRE : Hadii aad ku hadasho Soomaali, waaxda luqadaha, qaybta kaalmada adeegyada, waxay idiin hayaan hipolitoeg tova baez . So [facility] [phone].    ATENCIÓN: Si habla español, tiene a aviles disposición servicios gratuitos de asistencia lingüística. Llame al [phone].    We comply with applicable federal civil rights laws and [place] laws. We do not discriminate on the basis of race, color, national origin, age, disability sex, sexual orientation or gender identity.            Thank you!     Thank you for choosing [facility] VASC[facility] [place]  for your care. Our goal is always to provide you with excellent care. Hearing back from our patients is one way we can continue to improve our services. Please take a few minutes to complete the written survey that you may receive in the mail after your visit with us. Thank you!             Your Updated Medication List - Protect others around you: Learn how to safely use, store and throw away your medicines at www.disposemymeds.org.          This list is accurate as of: 17  9:06 AM.  Always  use your most recent med list.                   Brand Name Dispense Instructions for use Diagnosis    ALDACTONE 25 MG tablet   Generic drug:  spironolactone      Take 100 mg by mouth daily        Biotin 10 MG Tabs tablet      Take 10 mg by mouth    Acute deep vein thrombosis (DVT) of brachial vein of left upper extremity (H)       buPROPion 150 MG 24 hr tablet    WELLBUTRIN XL     Take 150 mg by mouth    Acute deep vein thrombosis (DVT) of brachial vein of left upper extremity (H)       clindamycin 2 % cream    CLEOCIN     one time if needed.    Acute deep vein thrombosis (DVT) of brachial vein of left upper extremity (H)       cyclobenzaprine 10 MG tablet    FLEXERIL     3 times daily if needed.    Acute deep vein thrombosis (DVT) of brachial vein of left upper extremity (H)       desvenlafaxine succinate 100 MG 24 hr tablet    PRISTIQ     Take 100 mg by mouth    Acute deep vein thrombosis (DVT) of brachial vein of left upper extremity (H)       HYDROcodone-acetaminophen 5-325 MG per tablet    NORCO    15 tablet    Take 1-2 tablets by mouth every 4 hours as needed for pain for 5 doses.        ibuprofen 600 MG tablet    ADVIL/MOTRIN    30 tablet    Take 1 tablet by mouth every 6 hours as needed for pain.        lactobacillus rhamnosus (GG) capsule      Take 1 capsule by mouth    Acute deep vein thrombosis (DVT) of brachial vein of left upper extremity (H)       lisdexamfetamine dimesylate 10 MG capsule    VYVANSE     Take 10 mg by mouth    Acute deep vein thrombosis (DVT) of brachial vein of left upper extremity (H)       meclizine 25 MG tablet    ANTIVERT    20 tablet    Take 1 tablet (25 mg) by mouth every 6 hours as needed for dizziness        nitroFURantoin (macrocrystal-monohydrate) 100 MG capsule    MACROBID     Take 100 mg by mouth    Acute deep vein thrombosis (DVT) of brachial vein of left upper extremity (H)       omeprazole 20 MG CR capsule    priLOSEC     Take 20 mg by mouth    Acute deep vein  thrombosis (DVT) of brachial vein of left upper extremity (H)       oxyCODONE 5 MG IR tablet    ROXICODONE    20 tablet    Take 0.5-1 tablets (2.5-5 mg) by mouth every 6 hours as needed for moderate to severe pain        PA FISH OIL 1000 MG Caps      Take 300 mg by mouth    Acute deep vein thrombosis (DVT) of brachial vein of left upper extremity (H)       valACYclovir 1000 mg tablet    VALTREX     TAKE 1 TABLET BY MOUTH 2 TIMES DAILY. FOR 5 DAYS    Acute deep vein thrombosis (DVT) of brachial vein of left upper extremity (H)       vitamin D3 1000 UNITS Caps      Take 1,000 Units by mouth    Acute deep vein thrombosis (DVT) of brachial vein of left upper extremity (H)

## 2017-10-19 DIAGNOSIS — I82.622 ACUTE DEEP VEIN THROMBOSIS (DVT) OF BRACHIAL VEIN OF LEFT UPPER EXTREMITY (H): ICD-10-CM

## 2017-10-19 LAB — D DIMER PPP FEU-MCNC: 0.4 UG/ML FEU (ref 0–0.5)

## 2017-10-19 PROCEDURE — 36415 COLL VENOUS BLD VENIPUNCTURE: CPT | Performed by: INTERNAL MEDICINE

## 2017-10-19 PROCEDURE — 85379 FIBRIN DEGRADATION QUANT: CPT | Performed by: INTERNAL MEDICINE

## 2017-10-23 ENCOUNTER — TELEPHONE (OUTPATIENT)
Dept: OTHER | Facility: CLINIC | Age: 37
End: 2017-10-23

## 2017-10-23 NOTE — TELEPHONE ENCOUNTER
Patient called and would like to know if she can stay off AC  Please advise  Kerri Oscar, RN, BSN

## 2017-11-14 ENCOUNTER — TELEPHONE (OUTPATIENT)
Dept: OTHER | Facility: CLINIC | Age: 37
End: 2017-11-14

## 2017-11-14 NOTE — TELEPHONE ENCOUNTER
Patient called and states that she is having a revision of breast implants and deviated septum repair next Tuesday.  She would like to know if she needs post op AC   Please advise  Kerri Oscar, RN, BSN

## 2018-02-16 ENCOUNTER — HOSPITAL ENCOUNTER (OUTPATIENT)
Dept: ULTRASOUND IMAGING | Facility: CLINIC | Age: 38
Discharge: HOME | End: 2018-02-16
Attending: PHYSICIAN ASSISTANT | Admitting: PHYSICIAN ASSISTANT
Payer: COMMERCIAL

## 2018-02-16 DIAGNOSIS — N92.0 MENORRHAGIA: ICD-10-CM

## 2018-02-16 PROCEDURE — 76856 US EXAM PELVIC COMPLETE: CPT

## 2018-12-11 ENCOUNTER — HOSPITAL PATHOLOGY (OUTPATIENT)
Dept: OTHER | Facility: CLINIC | Age: 38
End: 2018-12-11

## 2018-12-12 LAB — COPATH REPORT: NORMAL

## 2018-12-29 ENCOUNTER — ANESTHESIA (OUTPATIENT)
Dept: SURGERY | Facility: CLINIC | Age: 38
End: 2018-12-29
Payer: COMMERCIAL

## 2018-12-29 ENCOUNTER — ANESTHESIA EVENT (OUTPATIENT)
Dept: SURGERY | Facility: CLINIC | Age: 38
End: 2018-12-29
Payer: COMMERCIAL

## 2018-12-29 ENCOUNTER — HOSPITAL ENCOUNTER (OUTPATIENT)
Facility: CLINIC | Age: 38
Discharge: HOME | End: 2018-12-30
Attending: EMERGENCY MEDICINE | Admitting: OBSTETRICS & GYNECOLOGY
Payer: COMMERCIAL

## 2018-12-29 DIAGNOSIS — N93.9 VAGINAL BLEEDING: ICD-10-CM

## 2018-12-29 DIAGNOSIS — D62 ANEMIA DUE TO BLOOD LOSS, ACUTE: Primary | ICD-10-CM

## 2018-12-29 PROBLEM — R58 ACUTE BLEEDING: Status: ACTIVE | Noted: 2018-12-29

## 2018-12-29 LAB
ABO + RH BLD: NORMAL
ABO + RH BLD: NORMAL
ANION GAP SERPL CALCULATED.3IONS-SCNC: 14 MMOL/L (ref 6–17)
ANION GAP SERPL CALCULATED.3IONS-SCNC: 8 MMOL/L (ref 3–14)
BASOPHILS # BLD AUTO: 0.1 10E9/L (ref 0–0.2)
BASOPHILS NFR BLD AUTO: 0.6 %
BLD GP AB SCN SERPL QL: NORMAL
BLOOD BANK CMNT PATIENT-IMP: NORMAL
BUN SERPL-MCNC: 16 MG/DL (ref 5–24)
BUN SERPL-MCNC: 16 MG/DL (ref 7–30)
CA-I BLD-SCNC: 5.3 MG/DL (ref 4.4–5.2)
CALCIUM SERPL-MCNC: 9.2 MG/DL (ref 8.5–10.1)
CHLORIDE BLD-SCNC: 102 MMOL/L (ref 94–109)
CHLORIDE SERPL-SCNC: 105 MMOL/L (ref 94–109)
CO2 BLD-SCNC: 25 MMOL/L (ref 20–32)
CO2 SERPL-SCNC: 27 MMOL/L (ref 20–32)
CREAT BLD-MCNC: 0.7 MG/DL (ref 0.52–1.04)
CREAT SERPL-MCNC: 0.67 MG/DL (ref 0.52–1.04)
DIFFERENTIAL METHOD BLD: NORMAL
EOSINOPHIL # BLD AUTO: 0.1 10E9/L (ref 0–0.7)
EOSINOPHIL NFR BLD AUTO: 1.1 %
ERYTHROCYTE [DISTWIDTH] IN BLOOD BY AUTOMATED COUNT: 13.7 % (ref 10–15)
GFR SERPL CREATININE-BSD FRML MDRD: >90 ML/MIN/{1.73_M2}
GFR SERPL CREATININE-BSD FRML MDRD: >90 ML/MIN/{1.73_M2}
GLUCOSE BLD-MCNC: 107 MG/DL (ref 70–99)
GLUCOSE SERPL-MCNC: 104 MG/DL (ref 70–99)
HCT VFR BLD AUTO: 39.5 % (ref 35–47)
HCT VFR BLD CALC: 40 %PCV (ref 35–47)
HGB BLD CALC-MCNC: 13.6 G/DL (ref 11.7–15.7)
HGB BLD-MCNC: 10.5 G/DL (ref 11.7–15.7)
HGB BLD-MCNC: 12.5 G/DL (ref 11.7–15.7)
IMM GRANULOCYTES # BLD: 0 10E9/L (ref 0–0.4)
IMM GRANULOCYTES NFR BLD: 0.3 %
LYMPHOCYTES # BLD AUTO: 3.2 10E9/L (ref 0.8–5.3)
LYMPHOCYTES NFR BLD AUTO: 40.9 %
MCH RBC QN AUTO: 28.8 PG (ref 26.5–33)
MCHC RBC AUTO-ENTMCNC: 31.6 G/DL (ref 31.5–36.5)
MCV RBC AUTO: 91 FL (ref 78–100)
MONOCYTES # BLD AUTO: 0.5 10E9/L (ref 0–1.3)
MONOCYTES NFR BLD AUTO: 6.4 %
NEUTROPHILS # BLD AUTO: 4 10E9/L (ref 1.6–8.3)
NEUTROPHILS NFR BLD AUTO: 50.7 %
NRBC # BLD AUTO: 0 10*3/UL
NRBC BLD AUTO-RTO: 0 /100
PLATELET # BLD AUTO: 347 10E9/L (ref 150–450)
POTASSIUM BLD-SCNC: 3.6 MMOL/L (ref 3.4–5.3)
POTASSIUM SERPL-SCNC: 3.6 MMOL/L (ref 3.4–5.3)
RBC # BLD AUTO: 4.34 10E12/L (ref 3.8–5.2)
SODIUM BLD-SCNC: 141 MMOL/L (ref 133–144)
SODIUM SERPL-SCNC: 140 MMOL/L (ref 133–144)
SPECIMEN EXP DATE BLD: NORMAL
WBC # BLD AUTO: 7.8 10E9/L (ref 4–11)

## 2018-12-29 PROCEDURE — 25000128 H RX IP 250 OP 636: Performed by: NURSE ANESTHETIST, CERTIFIED REGISTERED

## 2018-12-29 PROCEDURE — 25000128 H RX IP 250 OP 636: Performed by: OBSTETRICS & GYNECOLOGY

## 2018-12-29 PROCEDURE — 85018 HEMOGLOBIN: CPT | Performed by: OBSTETRICS & GYNECOLOGY

## 2018-12-29 PROCEDURE — 25000128 H RX IP 250 OP 636: Performed by: EMERGENCY MEDICINE

## 2018-12-29 PROCEDURE — 80048 BASIC METABOLIC PNL TOTAL CA: CPT | Performed by: EMERGENCY MEDICINE

## 2018-12-29 PROCEDURE — 36000052 ZZH SURGERY LEVEL 2 EA 15 ADDTL MIN: Performed by: OBSTETRICS & GYNECOLOGY

## 2018-12-29 PROCEDURE — 25000125 ZZHC RX 250: Performed by: NURSE ANESTHETIST, CERTIFIED REGISTERED

## 2018-12-29 PROCEDURE — 71000012 ZZH RECOVERY PHASE 1 LEVEL 1 FIRST HR: Performed by: OBSTETRICS & GYNECOLOGY

## 2018-12-29 PROCEDURE — 86901 BLOOD TYPING SEROLOGIC RH(D): CPT | Performed by: EMERGENCY MEDICINE

## 2018-12-29 PROCEDURE — 40000306 ZZH STATISTIC PRE PROC ASSESS II: Performed by: OBSTETRICS & GYNECOLOGY

## 2018-12-29 PROCEDURE — 96360 HYDRATION IV INFUSION INIT: CPT

## 2018-12-29 PROCEDURE — 27210794 ZZH OR GENERAL SUPPLY STERILE: Performed by: OBSTETRICS & GYNECOLOGY

## 2018-12-29 PROCEDURE — 86900 BLOOD TYPING SEROLOGIC ABO: CPT | Performed by: EMERGENCY MEDICINE

## 2018-12-29 PROCEDURE — 36415 COLL VENOUS BLD VENIPUNCTURE: CPT | Performed by: OBSTETRICS & GYNECOLOGY

## 2018-12-29 PROCEDURE — 37000009 ZZH ANESTHESIA TECHNICAL FEE, EACH ADDTL 15 MIN: Performed by: OBSTETRICS & GYNECOLOGY

## 2018-12-29 PROCEDURE — 80047 BASIC METABLC PNL IONIZED CA: CPT

## 2018-12-29 PROCEDURE — 85014 HEMATOCRIT: CPT

## 2018-12-29 PROCEDURE — 99285 EMERGENCY DEPT VISIT HI MDM: CPT | Mod: 25

## 2018-12-29 PROCEDURE — 85025 COMPLETE CBC W/AUTO DIFF WBC: CPT | Performed by: EMERGENCY MEDICINE

## 2018-12-29 PROCEDURE — 36000050 ZZH SURGERY LEVEL 2 1ST 30 MIN: Performed by: OBSTETRICS & GYNECOLOGY

## 2018-12-29 PROCEDURE — 37000008 ZZH ANESTHESIA TECHNICAL FEE, 1ST 30 MIN: Performed by: OBSTETRICS & GYNECOLOGY

## 2018-12-29 PROCEDURE — 25000132 ZZH RX MED GY IP 250 OP 250 PS 637: Performed by: OBSTETRICS & GYNECOLOGY

## 2018-12-29 PROCEDURE — 86850 RBC ANTIBODY SCREEN: CPT | Performed by: EMERGENCY MEDICINE

## 2018-12-29 RX ORDER — NALOXONE HYDROCHLORIDE 0.4 MG/ML
.1-.4 INJECTION, SOLUTION INTRAMUSCULAR; INTRAVENOUS; SUBCUTANEOUS
Status: DISCONTINUED | OUTPATIENT
Start: 2018-12-29 | End: 2018-12-30 | Stop reason: HOSPADM

## 2018-12-29 RX ORDER — CETIRIZINE HYDROCHLORIDE 10 MG/1
10 TABLET ORAL EVERY MORNING
COMMUNITY
End: 2024-05-31

## 2018-12-29 RX ORDER — HYDROMORPHONE HYDROCHLORIDE 1 MG/ML
.3-.5 INJECTION, SOLUTION INTRAMUSCULAR; INTRAVENOUS; SUBCUTANEOUS EVERY 5 MIN PRN
Status: DISCONTINUED | OUTPATIENT
Start: 2018-12-29 | End: 2018-12-29 | Stop reason: HOSPADM

## 2018-12-29 RX ORDER — GLYCOPYRROLATE 0.2 MG/ML
INJECTION, SOLUTION INTRAMUSCULAR; INTRAVENOUS PRN
Status: DISCONTINUED | OUTPATIENT
Start: 2018-12-29 | End: 2018-12-29

## 2018-12-29 RX ORDER — SPIRONOLACTONE 100 MG/1
100 TABLET, FILM COATED ORAL DAILY
Status: DISCONTINUED | OUTPATIENT
Start: 2018-12-30 | End: 2018-12-30 | Stop reason: HOSPADM

## 2018-12-29 RX ORDER — SODIUM CHLORIDE 9 MG/ML
INJECTION, SOLUTION INTRAVENOUS CONTINUOUS PRN
Status: DISCONTINUED | OUTPATIENT
Start: 2018-12-29 | End: 2018-12-29

## 2018-12-29 RX ORDER — SPIRONOLACTONE 100 MG/1
100 TABLET, FILM COATED ORAL EVERY MORNING
COMMUNITY

## 2018-12-29 RX ORDER — LIDOCAINE HYDROCHLORIDE 10 MG/ML
INJECTION, SOLUTION INFILTRATION; PERINEURAL PRN
Status: DISCONTINUED | OUTPATIENT
Start: 2018-12-29 | End: 2018-12-29

## 2018-12-29 RX ORDER — ONDANSETRON 2 MG/ML
4 INJECTION INTRAMUSCULAR; INTRAVENOUS EVERY 6 HOURS PRN
Status: DISCONTINUED | OUTPATIENT
Start: 2018-12-29 | End: 2018-12-30 | Stop reason: HOSPADM

## 2018-12-29 RX ORDER — LABETALOL HYDROCHLORIDE 5 MG/ML
10 INJECTION, SOLUTION INTRAVENOUS
Status: DISCONTINUED | OUTPATIENT
Start: 2018-12-29 | End: 2018-12-29 | Stop reason: HOSPADM

## 2018-12-29 RX ORDER — MIRTAZAPINE 15 MG/1
15 TABLET, FILM COATED ORAL AT BEDTIME
Status: DISCONTINUED | OUTPATIENT
Start: 2018-12-29 | End: 2018-12-30 | Stop reason: HOSPADM

## 2018-12-29 RX ORDER — ONDANSETRON 2 MG/ML
INJECTION INTRAMUSCULAR; INTRAVENOUS PRN
Status: DISCONTINUED | OUTPATIENT
Start: 2018-12-29 | End: 2018-12-29

## 2018-12-29 RX ORDER — IBUPROFEN 600 MG/1
600 TABLET, FILM COATED ORAL EVERY 6 HOURS PRN
Status: DISCONTINUED | OUTPATIENT
Start: 2018-12-29 | End: 2018-12-30 | Stop reason: HOSPADM

## 2018-12-29 RX ORDER — FENTANYL CITRATE 50 UG/ML
25-50 INJECTION, SOLUTION INTRAMUSCULAR; INTRAVENOUS
Status: DISCONTINUED | OUTPATIENT
Start: 2018-12-29 | End: 2018-12-29 | Stop reason: HOSPADM

## 2018-12-29 RX ORDER — AZELASTINE HYDROCHLORIDE 137 UG/1
2 SPRAY, METERED NASAL DAILY
COMMUNITY
End: 2021-01-20

## 2018-12-29 RX ORDER — SODIUM CHLORIDE, SODIUM LACTATE, POTASSIUM CHLORIDE, CALCIUM CHLORIDE 600; 310; 30; 20 MG/100ML; MG/100ML; MG/100ML; MG/100ML
INJECTION, SOLUTION INTRAVENOUS CONTINUOUS
Status: DISCONTINUED | OUTPATIENT
Start: 2018-12-29 | End: 2018-12-30 | Stop reason: HOSPADM

## 2018-12-29 RX ORDER — CLINDAMYCIN PHOSPHATE 10 UG/ML
1 LOTION TOPICAL DAILY PRN
COMMUNITY
End: 2021-01-20

## 2018-12-29 RX ORDER — SODIUM CHLORIDE, SODIUM LACTATE, POTASSIUM CHLORIDE, CALCIUM CHLORIDE 600; 310; 30; 20 MG/100ML; MG/100ML; MG/100ML; MG/100ML
INJECTION, SOLUTION INTRAVENOUS CONTINUOUS
Status: DISCONTINUED | OUTPATIENT
Start: 2018-12-29 | End: 2018-12-29 | Stop reason: HOSPADM

## 2018-12-29 RX ORDER — ALPRAZOLAM 0.25 MG
0.25 TABLET ORAL
COMMUNITY

## 2018-12-29 RX ORDER — FENTANYL CITRATE 50 UG/ML
INJECTION, SOLUTION INTRAMUSCULAR; INTRAVENOUS PRN
Status: DISCONTINUED | OUTPATIENT
Start: 2018-12-29 | End: 2018-12-29

## 2018-12-29 RX ORDER — ACETAMINOPHEN 325 MG/1
650 TABLET ORAL EVERY 6 HOURS PRN
Status: DISCONTINUED | OUTPATIENT
Start: 2018-12-29 | End: 2018-12-30 | Stop reason: HOSPADM

## 2018-12-29 RX ORDER — LISDEXAMFETAMINE DIMESYLATE 10 MG/1
10 CAPSULE ORAL EVERY MORNING
Status: DISCONTINUED | OUTPATIENT
Start: 2018-12-30 | End: 2018-12-30 | Stop reason: HOSPADM

## 2018-12-29 RX ORDER — AMOXICILLIN 500 MG
2400 CAPSULE ORAL DAILY
COMMUNITY
End: 2021-01-20

## 2018-12-29 RX ORDER — MIRTAZAPINE 15 MG/1
30 TABLET, FILM COATED ORAL AT BEDTIME
COMMUNITY
End: 2021-01-20

## 2018-12-29 RX ORDER — SODIUM CHLORIDE, SODIUM LACTATE, POTASSIUM CHLORIDE, CALCIUM CHLORIDE 600; 310; 30; 20 MG/100ML; MG/100ML; MG/100ML; MG/100ML
INJECTION, SOLUTION INTRAVENOUS CONTINUOUS PRN
Status: DISCONTINUED | OUTPATIENT
Start: 2018-12-29 | End: 2018-12-29

## 2018-12-29 RX ORDER — ONDANSETRON 4 MG/1
4 TABLET, ORALLY DISINTEGRATING ORAL EVERY 30 MIN PRN
Status: DISCONTINUED | OUTPATIENT
Start: 2018-12-29 | End: 2018-12-29 | Stop reason: HOSPADM

## 2018-12-29 RX ORDER — DEXAMETHASONE SODIUM PHOSPHATE 4 MG/ML
INJECTION, SOLUTION INTRA-ARTICULAR; INTRALESIONAL; INTRAMUSCULAR; INTRAVENOUS; SOFT TISSUE PRN
Status: DISCONTINUED | OUTPATIENT
Start: 2018-12-29 | End: 2018-12-29

## 2018-12-29 RX ORDER — LIDOCAINE 40 MG/G
CREAM TOPICAL
Status: DISCONTINUED | OUTPATIENT
Start: 2018-12-29 | End: 2018-12-30 | Stop reason: HOSPADM

## 2018-12-29 RX ORDER — CEFAZOLIN SODIUM 2 G/100ML
2 INJECTION, SOLUTION INTRAVENOUS
Status: COMPLETED | OUTPATIENT
Start: 2018-12-29 | End: 2018-12-29

## 2018-12-29 RX ORDER — ZOLPIDEM TARTRATE 5 MG/1
5 TABLET ORAL
Status: DISCONTINUED | OUTPATIENT
Start: 2018-12-29 | End: 2018-12-30 | Stop reason: HOSPADM

## 2018-12-29 RX ORDER — DESVENLAFAXINE 100 MG/1
100 TABLET, EXTENDED RELEASE ORAL DAILY
Status: DISCONTINUED | OUTPATIENT
Start: 2018-12-30 | End: 2018-12-30

## 2018-12-29 RX ORDER — HYDRALAZINE HYDROCHLORIDE 20 MG/ML
2.5-5 INJECTION INTRAMUSCULAR; INTRAVENOUS EVERY 10 MIN PRN
Status: DISCONTINUED | OUTPATIENT
Start: 2018-12-29 | End: 2018-12-29 | Stop reason: HOSPADM

## 2018-12-29 RX ORDER — PROPOFOL 10 MG/ML
INJECTION, EMULSION INTRAVENOUS PRN
Status: DISCONTINUED | OUTPATIENT
Start: 2018-12-29 | End: 2018-12-29

## 2018-12-29 RX ORDER — BUPROPION HYDROCHLORIDE 150 MG/1
150 TABLET ORAL DAILY
Status: DISCONTINUED | OUTPATIENT
Start: 2018-12-30 | End: 2018-12-30 | Stop reason: HOSPADM

## 2018-12-29 RX ORDER — CEFAZOLIN SODIUM 1 G/50ML
1 INJECTION, SOLUTION INTRAVENOUS SEE ADMIN INSTRUCTIONS
Status: DISCONTINUED | OUTPATIENT
Start: 2018-12-29 | End: 2018-12-29 | Stop reason: HOSPADM

## 2018-12-29 RX ORDER — PROPOFOL 10 MG/ML
INJECTION, EMULSION INTRAVENOUS CONTINUOUS PRN
Status: DISCONTINUED | OUTPATIENT
Start: 2018-12-29 | End: 2018-12-29

## 2018-12-29 RX ORDER — NALOXONE HYDROCHLORIDE 0.4 MG/ML
.1-.4 INJECTION, SOLUTION INTRAMUSCULAR; INTRAVENOUS; SUBCUTANEOUS
Status: DISCONTINUED | OUTPATIENT
Start: 2018-12-29 | End: 2018-12-29

## 2018-12-29 RX ORDER — ONDANSETRON 2 MG/ML
4 INJECTION INTRAMUSCULAR; INTRAVENOUS EVERY 30 MIN PRN
Status: DISCONTINUED | OUTPATIENT
Start: 2018-12-29 | End: 2018-12-29 | Stop reason: HOSPADM

## 2018-12-29 RX ORDER — PHENAZOPYRIDINE HYDROCHLORIDE 200 MG/1
200 TABLET, FILM COATED ORAL ONCE
Status: DISCONTINUED | OUTPATIENT
Start: 2018-12-29 | End: 2018-12-29 | Stop reason: HOSPADM

## 2018-12-29 RX ORDER — ONDANSETRON 4 MG/1
4 TABLET, ORALLY DISINTEGRATING ORAL EVERY 6 HOURS PRN
Status: DISCONTINUED | OUTPATIENT
Start: 2018-12-29 | End: 2018-12-30 | Stop reason: HOSPADM

## 2018-12-29 RX ADMIN — ROCURONIUM BROMIDE 10 MG: 10 INJECTION INTRAVENOUS at 16:09

## 2018-12-29 RX ADMIN — SODIUM CHLORIDE, POTASSIUM CHLORIDE, SODIUM LACTATE AND CALCIUM CHLORIDE: 600; 310; 30; 20 INJECTION, SOLUTION INTRAVENOUS at 16:00

## 2018-12-29 RX ADMIN — SODIUM CHLORIDE: 9 INJECTION, SOLUTION INTRAVENOUS at 16:00

## 2018-12-29 RX ADMIN — PROPOFOL 30 MCG/KG/MIN: 10 INJECTION, EMULSION INTRAVENOUS at 16:25

## 2018-12-29 RX ADMIN — IBUPROFEN 600 MG: 600 TABLET ORAL at 21:13

## 2018-12-29 RX ADMIN — GLYCOPYRROLATE 0.2 MG: 0.2 INJECTION, SOLUTION INTRAMUSCULAR; INTRAVENOUS at 16:09

## 2018-12-29 RX ADMIN — ONDANSETRON 4 MG: 2 INJECTION INTRAMUSCULAR; INTRAVENOUS at 16:25

## 2018-12-29 RX ADMIN — MIDAZOLAM 2 MG: 1 INJECTION INTRAMUSCULAR; INTRAVENOUS at 16:20

## 2018-12-29 RX ADMIN — PROPOFOL 180 MG: 10 INJECTION, EMULSION INTRAVENOUS at 16:09

## 2018-12-29 RX ADMIN — SODIUM CHLORIDE 1000 ML: 9 INJECTION, SOLUTION INTRAVENOUS at 15:16

## 2018-12-29 RX ADMIN — FENTANYL CITRATE 50 MCG: 50 INJECTION, SOLUTION INTRAMUSCULAR; INTRAVENOUS at 16:25

## 2018-12-29 RX ADMIN — LIDOCAINE HYDROCHLORIDE 40 MG: 10 INJECTION, SOLUTION INFILTRATION; PERINEURAL at 16:09

## 2018-12-29 RX ADMIN — SODIUM CHLORIDE, POTASSIUM CHLORIDE, SODIUM LACTATE AND CALCIUM CHLORIDE: 600; 310; 30; 20 INJECTION, SOLUTION INTRAVENOUS at 19:59

## 2018-12-29 RX ADMIN — ACETAMINOPHEN 650 MG: 325 TABLET, FILM COATED ORAL at 20:23

## 2018-12-29 RX ADMIN — MIRTAZAPINE 15 MG: 15 TABLET, FILM COATED ORAL at 21:14

## 2018-12-29 RX ADMIN — Medication 100 MG: at 16:09

## 2018-12-29 RX ADMIN — DEXAMETHASONE SODIUM PHOSPHATE 4 MG: 4 INJECTION, SOLUTION INTRA-ARTICULAR; INTRALESIONAL; INTRAMUSCULAR; INTRAVENOUS; SOFT TISSUE at 16:09

## 2018-12-29 RX ADMIN — Medication 1 LOZENGE: at 20:58

## 2018-12-29 RX ADMIN — CEFAZOLIN SODIUM 2 G: 2 INJECTION, SOLUTION INTRAVENOUS at 16:10

## 2018-12-29 RX ADMIN — OMEPRAZOLE 20 MG: 20 CAPSULE, DELAYED RELEASE ORAL at 21:14

## 2018-12-29 ASSESSMENT — ENCOUNTER SYMPTOMS
HEADACHES: 0
DIARRHEA: 0
BRUISES/BLEEDS EASILY: 0
DIZZINESS: 0
BLOOD IN STOOL: 0
NAUSEA: 0
DIAPHORESIS: 0
ADENOPATHY: 0
ABDOMINAL PAIN: 0
CONSTIPATION: 0
ABDOMINAL DISTENTION: 0
SHORTNESS OF BREATH: 0
VOMITING: 0

## 2018-12-29 ASSESSMENT — ACTIVITIES OF DAILY LIVING (ADL)
RETIRED_EATING: 0-->INDEPENDENT
RETIRED_COMMUNICATION: 0-->UNDERSTANDS/COMMUNICATES WITHOUT DIFFICULTY
DRESS: 0-->INDEPENDENT
AMBULATION: 0-->INDEPENDENT
SWALLOWING: 0-->SWALLOWS FOODS/LIQUIDS WITHOUT DIFFICULTY
FALL_HISTORY_WITHIN_LAST_SIX_MONTHS: NO
TRANSFERRING: 0-->INDEPENDENT
COGNITION: 0 - NO COGNITION ISSUES REPORTED
BATHING: 0-->INDEPENDENT
TOILETING: 0-->INDEPENDENT

## 2018-12-29 ASSESSMENT — MIFFLIN-ST. JEOR: SCORE: 1544.3

## 2018-12-29 NOTE — ANESTHESIA CARE TRANSFER NOTE
Patient: Aga Alanis    Procedure(s):  Vaginal exam under anesthesia, repair of vaginal cuff  Repair of vaginal laceration    Diagnosis: vaginal bleeding  Diagnosis Additional Information: No value filed.    Anesthesia Type:   General, ETT, RSI     Note:  Airway :Face Mask  Patient transferred to:PACU  Comments: VSS.  Spontaneously breathting O2 per open face mask.  Report given to RN.Handoff Report: Identifed the Patient, Identified the Reponsible Provider, Reviewed the pertinent medical history, Discussed the surgical course, Reviewed Intra-OP anesthesia mangement and issues during anesthesia, Set expectations for post-procedure period and Allowed opportunity for questions and acknowledgement of understanding      Vitals: (Last set prior to Anesthesia Care Transfer)    CRNA VITALS  12/29/2018 1618 - 12/29/2018 1654      12/29/2018             Pulse:  111    SpO2:  91 %    Resp Rate (observed):  1  (Abnormal)                 Electronically Signed By: MAITE Pritchett CRNA  December 29, 2018  4:54 PM

## 2018-12-29 NOTE — ED NOTES
Bed: ED12  Expected date: 12/29/18  Expected time: 2:43 PM  Means of arrival: Ambulance  Comments:  HE 27yo postpartum bleed.

## 2018-12-29 NOTE — BRIEF OP NOTE
[facility]    Brief Operative Note    Pre-operative diagnosis: vaginal bleeding  Post-operative diagnosis Same as above  Procedure: Examination under anesthesia, placement of sutures along the vaginal cuff  Surgeon: Bev Fischer MD  Anesthesia: General   EBL                  A few CCs  Specimens: none  Findings:  Minimal bleeding noted, area along the left side with clot. .  Complications: None

## 2018-12-29 NOTE — ED TRIAGE NOTES
Pt presents via EMS for vaginal bleeding. Pt had a hysterectomy on 12/12, no known complications. Pt reports bleeding started approx 20 min PTA. Pt denies pain. Pt reports feeling SOB O2 97% RA. Mild dizziness, and nausea relieved by applying O2 via NC. Pt A&Ox4.

## 2018-12-29 NOTE — ANESTHESIA POSTPROCEDURE EVALUATION
Patient: Aga Alanis    Procedure(s):  Vaginal exam under anesthesia, repair of vaginal cuff  Repair of vaginal laceration    Diagnosis:vaginal bleeding  Diagnosis Additional Information: Vaginal bleeding   Song    Anesthesia Type:  General, ETT, RSI    Note:  Anesthesia Post Evaluation    Patient location during evaluation: PACU  Patient participation: Able to fully participate in evaluation  Level of consciousness: awake  Pain management: adequate  Airway patency: patent  Cardiovascular status: acceptable  Respiratory status: acceptable  Hydration status: acceptable  PONV: none     Anesthetic complications: None          Last vitals:  Vitals:    12/29/18 1540 12/29/18 1555 12/29/18 1653   BP: 128/87  122/70   Pulse: 110  123   Resp: 17  19   Temp:  97.6  F (36.4  C) 97.4  F (36.3  C)   SpO2: 99%  100%         Electronically Signed By: Jamir Barr MD  December 29, 2018  4:57 PM

## 2018-12-29 NOTE — ANESTHESIA PREPROCEDURE EVALUATION
Anesthesia Pre-Procedure Evaluation    Patient: Aga Alanis   MRN: [id number] : 1980          Preoperative Diagnosis: vaginal bleeding    Procedure(s):  EXAM UNDER ANESTHESIA PELVIC  REPAIR LACERATION VAGINA    Past Medical History:   Diagnosis Date     DVT (deep venous thrombosis) (H)      Ovarian cyst      Past Surgical History:   Procedure Laterality Date     COSMETIC SURGERY      breast augmentation and tummy tuck      ENT SURGERY      tubes      GYN SURGERY      ovarian cyst , tubes tied      HYSTERECTOMY       ORTHOPEDIC SURGERY      knee right      Anesthesia Evaluation     . Pt has had prior anesthetic.            ROS/MED HX    ENT/Pulmonary:  - neg pulmonary ROS     Neurologic:  - neg neurologic ROS     Cardiovascular:  - neg cardiovascular ROS       METS/Exercise Tolerance:     Hematologic:     (+) History of blood clots -      Musculoskeletal:         GI/Hepatic:  - neg GI/hepatic ROS       Renal/Genitourinary:     (+) Other Renal/ Genitourinary, vaginal bleeding      Endo:  - neg endo ROS       Psychiatric:         Infectious Disease:  - neg infectious disease ROS       Malignancy:         Other:                          Physical Exam  Normal systems: cardiovascular and pulmonary    Airway   Mallampati: II  TM distance: >3 FB  Neck ROM: full    Dental     Cardiovascular       Pulmonary             Lab Results   Component Value Date    WBC 7.8 2018    HGB 12.5 2018    HCT 39.5 2018     2018     2018    POTASSIUM 3.6 2018    CHLORIDE 102 2018    CO2 26 2017    BUN 16 2018    CR 0.67 2017     (H) 2018    SUMEET 9.7 2017    HCG Negative 2010    HCGS Negative 2017       Preop Vitals  BP Readings from Last 3 Encounters:   18 152/90   17 109/77   17 123/81    Pulse Readings from Last 3 Encounters:   17 101   17 97   17 94      Resp Readings from Last 3  "Encounters:   06/23/17 18   05/08/17 20   05/02/17 16    SpO2 Readings from Last 3 Encounters:   12/29/18 97%   09/29/17 98%   09/06/17 98%      Temp Readings from Last 1 Encounters:   12/29/18 99.1  F (37.3  C) (Oral)    Ht Readings from Last 1 Encounters:   09/06/17 1.626 m (5' 4\")      Wt Readings from Last 1 Encounters:   12/29/18 88.9 kg (196 lb)    Estimated body mass index is 33.64 kg/m  as calculated from the following:    Height as of 9/6/17: 1.626 m (5' 4\").    Weight as of this encounter: 88.9 kg (196 lb).       Anesthesia Plan      History & Physical Review  History and physical reviewed and following examination; no interval change.    ASA Status:  2 emergent.    NPO Status:  Waived due to emergency    Plan for General, ETT and RSI with Intravenous and Propofol induction. Maintenance will be Balanced.    PONV prophylaxis:  Ondansetron (or other 5HT-3) and Dexamethasone or Solumedrol       Postoperative Care  Postoperative pain management:  IV analgesics.      Consents  Anesthetic plan, risks, benefits and alternatives discussed with:  Patient.  Use of blood products discussed: Yes.   Use of blood products discussed with Patient.  Consented to blood products.  .                 Jamir Barr MD                    .  "

## 2018-12-29 NOTE — ED PROVIDER NOTES
"  History     Chief Complaint:  Post op Hemorrhage    HPI   Aga Alanis is a 38 year old female who presents to the emergency department for evaluation of post op hemorrhage. The patient reports she was standing in her kitchen today around 1400 when she had sudden onset vaginal bleeding. She notes she had a hysterectomy performed 3 weeks ago, and she contacted her OB GYN office regarding her bleeding. She indicates the bleeding was \"gushing,\" and she has since developed lower abdominal/pelvic pain. OB instructed her to present to the ED with these symptoms. The patient denies any recent similar episodes, but she does note an infection of her incision following the procedure. Ovaries were left intact during procedure.    Allergies:  Codeine Sulfate  Demerol  Sulfa Drugs  Flagyl [Metronidazole]     Medications:    Wellbutrin  Cleocin  Flexeril  Pristiq  Ibuprofen  Vyvanse  Antivert  Macrobid  Prilosec  Aldactone  Valtrex     Past Medical History:    DVT  Ovarian cyst    Past Surgical History:    Breast augmentation and tummy tuck  ENT surgery  GYN surgery, ovarian cyst  Hysterectomy   Knee right surgery    Family History:    No past pertinent family history.    Social History:  Presents with .  Never smoker.  Positive for alcohol use.   Marital Status:   [2]     Review of Systems   Constitutional: Negative for diaphoresis.   Respiratory: Negative for shortness of breath.    Gastrointestinal: Negative for abdominal distention, abdominal pain, blood in stool, constipation, diarrhea, nausea and vomiting.   Genitourinary: Positive for pelvic pain and vaginal bleeding.   Skin: Negative for pallor.   Neurological: Negative for dizziness and headaches.   Hematological: Negative for adenopathy. Does not bruise/bleed easily.   All other systems reviewed and are negative.        Physical Exam     Patient Vitals for the past 24 hrs:   BP Temp Temp src Pulse Heart Rate Resp SpO2 Weight   12/29/18 2113 " 106/71 98.6  F (37  C) Oral -- 105 16 98 % --   12/29/18 2015 103/62 -- -- -- 107 -- -- --   12/29/18 2000 107/64 -- -- -- 113 -- -- --   12/29/18 1945 102/61 -- -- -- 94 -- -- --   12/29/18 1930 111/68 -- -- -- 97 -- -- --   12/29/18 1913 103/72 -- -- -- 97 17 100 % --   12/29/18 1845 113/70 98.1  F (36.7  C) Oral -- 84 16 100 % --   12/29/18 1745 120/75 -- -- 95 90 15 98 % --   12/29/18 1730 118/78 -- -- 94 94 15 98 % --   12/29/18 1715 123/80 -- -- 102 103 15 99 % --   12/29/18 1710 124/77 -- -- 106 104 15 99 % --   12/29/18 1705 120/80 -- -- 113 114 23 98 % --   12/29/18 1700 125/77 -- -- 112 111 16 100 % --   12/29/18 1655 106/81 -- -- 121 122 15 100 % --   12/29/18 1653 122/70 97.4  F (36.3  C) Temporal 123 117 19 100 % --   12/29/18 1555 -- 97.6  F (36.4  C) Temporal -- -- -- -- --   12/29/18 1540 128/87 -- -- 110 101 17 99 % --   12/29/18 1535 109/86 -- -- 108 110 -- 100 % --   12/29/18 1530 121/86 -- -- 105 104 -- 98 % --   12/29/18 1525 (!) 125/91 -- -- 111 107 -- -- --   12/29/18 1520 115/90 -- -- 104 106 -- -- --   12/29/18 1515 112/89 -- -- 98 107 -- -- --   12/29/18 1510 (!) 117/97 -- -- 107 111 (!) 43 -- --   12/29/18 1505 -- -- -- -- 103 24 -- --   12/29/18 1500 122/89 -- -- 111 105 30 90 % --   12/29/18 1455 152/90 99.1  F (37.3  C) Oral -- 76 -- 97 % 88.9 kg (196 lb)     Physical Exam  Constitutional: Patient is well appearing. No distress.  Head: Atraumatic.  Mouth/Throat: Oropharynx is clear and moist. No oropharyngeal exudate.  Eyes: Conjunctivae and EOM are normal. No scleral icterus.  Neck: Normal range of motion. Neck supple.   Cardiovascular: Normal rate, regular rhythm, normal heart sounds and intact distal pulses.   Pulmonary/Chest: Breath sounds normal. No respiratory distress.  Abdominal: Soft. Bowel sounds are normal. No distension. No tenderness. No rebound or guarding.   Musculoskeletal: Normal range of motion. No edema or tenderness.   Neurological: Alert and orientated to person,  place, and time. No observable focal neuro deficit  Skin: Warm and dry. No rash noted. Not diaphoretic.     Emergency Department Course     Laboratory:  BC: WBC: 7.8, HGB: 12.5, PLT: 347  BMP: Glucose 104 (H), o/w WNL (Creatinine: 0.67)    ISTAT Basic Met Ica hematocrit POCT: Glucose 107 (H), Calcium 5.3 (H), o/w WNL (Creatinine 0.7)    ABO/Rh type and screen: pending at time of admission    Interventions:  1516 NS 1L IV BOLUS   NS 1L IV BOLUS    Emergency Department Course:  Nursing notes and vitals reviewed. 1525 I performed an exam of the patient as documented above. Deferred pelvic exam at this time due to OB GYN coming to the ED.     IV inserted. Medicine administered as documented above. Blood drawn. This was sent to the lab for further testing, results above.    The patient was seen by Dr. Fischer, OB GYN at [facility], for further evaluation. Dr. Fischer opted to take the patient directly to the OR for further procedures and treatment.    I personally reviewed the laboratory results with the Patient and answered all related questions prior to admission to the OR.    Impression & Plan      Medical Decision Making:  Vaginal bleeding post op with reassuring exam vitals and lab workup.  Obgyn took to OR for pelvic exam.      Diagnosis:    ICD-10-CM    1. Vaginal bleeding N93.9        Disposition:  Admitted to the OR with Dr. Fischer.    MARTIN, Gino Brush, am serving as a scribe on 12/29/2018 at 3:10 PM to personally document services performed by Erich Benton MD based on my observations and the provider's statements to me.     Gino Brush  12/29/2018   [facility] EMERGENCY DEPARTMENT       Erich Benton MD  12/29/18 0045

## 2018-12-29 NOTE — H&P
Aga is a 38 year old who presents to [facility] ED via ambulance for sudden heavy bleeding that began at 2pm while washing dishes in her home. She felt dizzy and  called 911. In the ED she continues to have bleeding. She and her  were advised to undergo examination under anesthesia, repair of vagina cuff, placement of stitches to stop bleeding and possible diagnostic laparoscopy. Risks and benefits reviewed. Aga and her  agree to proceed with the surgery.     Of note Aga underwent TLH-BS on 12/11/18 for menometrorrhagia at the [facility]. States that she has been taking it easy. Denies intercourse, heavy lifting and exercise.     PMH  Heterozygous for Factor V Leiden            DVT x 2             Ovarian cyst    Surgeries TLH-BS 12/11/18                   Bilateral breast augmentation and abdominoplasty                   LSC ovarian cystectomy                    ENT surgery                   Right knee surgery    Allergies   Codeine, demerol, sulfa, flagyl    Medications Wellbutrin, Cleocin, flexeril, Pristiq, ibuprofen Vyvanse, Antivert, Macrobid, Prilosec, Aldactone, Valtrex    Social history , never smoked, + social use of alcohol    Exam abdomen soft and non tender with healing umbilical incision             + blood clots and blood soaked underwear              Extremities nl    Hgb  12  /90 HR 76    A: s/p TLH-BS 12/11/18 had been doing well until this afternoon when while washing dishes had a sudden gush of blood with continue bleeding.    P: examination under anesthesia, placement of sutures to stop bleeding, possible diagnostic LSC.

## 2018-12-30 ENCOUNTER — APPOINTMENT (OUTPATIENT)
Dept: ULTRASOUND IMAGING | Facility: CLINIC | Age: 38
End: 2018-12-30
Attending: OBSTETRICS & GYNECOLOGY
Payer: COMMERCIAL

## 2018-12-30 VITALS
BODY MASS INDEX: 33.46 KG/M2 | SYSTOLIC BLOOD PRESSURE: 100 MMHG | TEMPERATURE: 98.8 F | DIASTOLIC BLOOD PRESSURE: 61 MMHG | HEIGHT: 64 IN | OXYGEN SATURATION: 99 % | WEIGHT: 196 LBS | HEART RATE: 95 BPM | RESPIRATION RATE: 16 BRPM

## 2018-12-30 LAB
GLUCOSE SERPL-MCNC: 95 MG/DL (ref 70–99)
HGB BLD-MCNC: 9.7 G/DL (ref 11.7–15.7)
HGB BLD-MCNC: 9.8 G/DL (ref 11.7–15.7)

## 2018-12-30 PROCEDURE — 25000132 ZZH RX MED GY IP 250 OP 250 PS 637: Performed by: OBSTETRICS & GYNECOLOGY

## 2018-12-30 PROCEDURE — 82947 ASSAY GLUCOSE BLOOD QUANT: CPT | Performed by: OBSTETRICS & GYNECOLOGY

## 2018-12-30 PROCEDURE — 93971 EXTREMITY STUDY: CPT | Mod: RT

## 2018-12-30 PROCEDURE — 25000128 H RX IP 250 OP 636: Performed by: OBSTETRICS & GYNECOLOGY

## 2018-12-30 PROCEDURE — 36415 COLL VENOUS BLD VENIPUNCTURE: CPT | Performed by: OBSTETRICS & GYNECOLOGY

## 2018-12-30 PROCEDURE — 99207 ZZC CDG-CODE CATEGORY CHANGED: CPT | Performed by: HOSPITALIST

## 2018-12-30 PROCEDURE — 99203 OFFICE O/P NEW LOW 30 MIN: CPT | Performed by: HOSPITALIST

## 2018-12-30 PROCEDURE — 85018 HEMOGLOBIN: CPT | Performed by: OBSTETRICS & GYNECOLOGY

## 2018-12-30 RX ORDER — DESVENLAFAXINE 50 MG/1
50 TABLET, FILM COATED, EXTENDED RELEASE ORAL ONCE
Status: COMPLETED | OUTPATIENT
Start: 2018-12-30 | End: 2018-12-30

## 2018-12-30 RX ORDER — FERROUS SULFATE 325(65) MG
325 TABLET, DELAYED RELEASE (ENTERIC COATED) ORAL DAILY
Qty: 30 TABLET | Refills: 0 | Status: SHIPPED | OUTPATIENT
Start: 2018-12-30 | End: 2019-01-29

## 2018-12-30 RX ADMIN — DESVENLAFAXINE SUCCINATE 50 MG: 50 TABLET, EXTENDED RELEASE ORAL at 11:07

## 2018-12-30 RX ADMIN — ACETAMINOPHEN 650 MG: 325 TABLET, FILM COATED ORAL at 04:51

## 2018-12-30 RX ADMIN — SODIUM CHLORIDE, POTASSIUM CHLORIDE, SODIUM LACTATE AND CALCIUM CHLORIDE: 600; 310; 30; 20 INJECTION, SOLUTION INTRAVENOUS at 04:51

## 2018-12-30 RX ADMIN — DESVENLAFAXINE SUCCINATE 100 MG: 100 TABLET, EXTENDED RELEASE ORAL at 09:12

## 2018-12-30 RX ADMIN — Medication 1 LOZENGE: at 09:12

## 2018-12-30 RX ADMIN — OMEPRAZOLE 20 MG: 20 CAPSULE, DELAYED RELEASE ORAL at 09:10

## 2018-12-30 RX ADMIN — SPIRONOLACTONE 100 MG: 100 TABLET, FILM COATED ORAL at 09:10

## 2018-12-30 RX ADMIN — Medication 1 LOZENGE: at 15:35

## 2018-12-30 NOTE — PLAN OF CARE
VSS. Hypotension improving. Tolerating a regular diet. Complaints of throat irritation. Anesthesia notified and came and assessed. MD notified of IV infiltration. Ultrasound ordered due to history of DVTs. Scant vaginal drainage. Denies abdominal pain.

## 2018-12-30 NOTE — PLAN OF CARE
Patient meeting goals for discharge. Discharge education completed with patient. Patient verbalized understanding of education. All belongings sent home with patient upon discharge. Patient discharged home with spouse.

## 2018-12-30 NOTE — PROGRESS NOTES
Asked to evaluate pt for post operative throat pain.  Pain with swallowing that is improved with analgesics and oral lozenges.  On exam some erythema and striated submucosal minor bruising.  Expect related to difficult intubation.  No lacerations noted.  Anticipate resolution in the next week.  Dr. MO Wright

## 2018-12-30 NOTE — PROVIDER NOTIFICATION
RN placed page at 0050. Dr. Fischer returned page at 0110. RN informed Dr that patient's BP was 78/47 while asleep and 88/54 while awake. HR 83-85. Dr. Fischer gave RN a verbal order for a stat hemoglobin to be drawn. RN place order and will notify Dr. Fischer when results are back. RN will continue to monitor patient and provide supportive therapies as needed.

## 2018-12-30 NOTE — PROVIDER NOTIFICATION
Called Dr. Fischer due to low BP's. Patient HR stable. Asymptomatic related to BP Scant amount of drainage on angi-pad. CMS intact. Plan to repeat BP in one hour. Call Dr. Fischer if BP decreases anymore, HR increases, increase in blood flow, or patient becomes symptomatic. Continuous pulse ox maintained. Plan reviewed with patient and oncoming nurse, Chandrika. Patient told to call out immediately if any visual changes, headache, numbness or tingling, weakness, signs or feelings of bleeding, and/or does not feel well in anyway. WADE Cobos taking over care.

## 2018-12-30 NOTE — PROVIDER NOTIFICATION
Dr. Fischer previously requested an update on BP with next VS check. BP 83/52, MAP 66, HR 83. Dr. Fischer called back and instructed RN to check BP Q4H. RN will continue to monitor.

## 2018-12-30 NOTE — CONSULTS
[facility]  Hospitalist Consult Note    Name: Aga Alanis      MRN: [id number]  YOB: 1980    Age: 38 year old  Date of admission: 12/29/2018  Primary care provider: Michael Abbott     Requesting Physician:  Dr Fischer  Reason for consultation:  Post-operative medical management         Assessment and Plan:   Aga Alanis is a 38 year old female with a history of DVT and Factor V Leiden who was admitted for vaginal bleeding following lap hysterectomy 3 wk ago requiring vaginal cuff suture surgery yesterday now noted to have RUE superficial thrombus.    1. RUE superficial thrombophlebitis: Warm compresses, not in itself an indication for anticoagulation.    2. Hx of UE DVT and heterozygous Factor V: Might consider lifelong anticoagulation as OP after current bleeding issues stabilized but currently not an appropriate time for AC given recent massive bleeding and surgery. I recommended follow-up with Heme as OP.    3. Recent lap hysterectomy s/p vaginal cuff suture procedure: Mgmt per OBGYN.    Code status: FULL.  Prophylaxis: Currently on PCDs.  Disposition: Deferred to the primary service.    Thank you for the consultation, we will continue to follow along during the hospitalization. Please page with any questions or concerns.         History of Present Illness:   Aga Alanis is a 38 year old female who is being hospitalized for vaginal bleeding following recent lap hysterectomy requiring vaginal cuff procedure. Pre-operative note was fully reviewed and recommendations acknowledged. Op note and anesthesia notes and flow sheets were also reviewed.     The patient had no complications related to the procedure and has had an unremarkable post-operative course to this point. Currently pain is adequately controlled. No nausea, vomiting, diarrhea or constipation. No fevers, chills, diaphoresis. No chest pain, palpitations, dyspnea. Tolerating oral intake. No  excessive somnolence and patient is fully alert and oriented. The patient has no other complaints at this time.      Was noted to have some RUE swelling at site of IV. US showed superficial basilic vein thrombus. I was asked to consult on this issue. Noted to have h/o UE DVT and previous 3 mo tx with Pradaxa and she reports h/o heterozygous Factor V.              Past Medical History:     Past Medical History:   Diagnosis Date     DVT (deep venous thrombosis) (H)      Ovarian cyst              Past Surgical History:     Past Surgical History:   Procedure Laterality Date     COSMETIC SURGERY      breast augmentation and tummy tuck 2017     ENT SURGERY      tubes      GYN SURGERY      ovarian cyst , tubes tied      HYSTERECTOMY       ORTHOPEDIC SURGERY      knee right                Social History:     Social History     Tobacco Use     Smoking status: Never Smoker     Smokeless tobacco: Never Used   Substance Use Topics     Alcohol use: Yes             Family History:   Family history was fully reviewed and non-contributory in this case.          Allergies:     Allergies   Allergen Reactions     Codeine Sulfate      Demerol      Sulfa Drugs      Flagyl [Metronidazole] Rash             Medications:     Prior to Admission medications    Medication Sig Last Dose Taking? Auth Provider   ALPRAZolam (XANAX) 0.25 MG tablet Take 0.25 mg by mouth nightly as needed for anxiety Past Week at NA Yes Unknown, Entered By History   Azelastine HCl 137 MCG/SPRAY SOLN Spray 2 sprays in nostril daily 2 SPRAYS INTO EACH NOSTRIL DAILY 12/28/2018 at AM Yes Unknown, Entered By History   Biotin 10 MG TABS tablet Take 10 mg by mouth daily  12/29/2018 at 0800 Yes Reported, Patient   cetirizine (ZYRTEC ALLERGY) 10 MG tablet Take 10 mg by mouth daily 12/29/2018 at AM Yes Unknown, Entered By History   Cholecalciferol (VITAMIN D3) 1000 UNITS CAPS Take 1,000 Units by mouth daily  12/29/2018 at 0800 Yes Reported, Patient   clindamycin (CLEOCIN T) 1  "% external lotion Apply 1 g topically daily as needed Past Month at  Yes Unknown, Entered By History   desvenlafaxine succinate (PRISTIQ) 100 MG 24 hr tablet Take 150 mg by mouth every morning  12/29/2018 at 0800 Yes Reported, Patient   lactobacillus rhamnosus, GG, (CULTURELL) capsule Take 1 capsule by mouth daily  12/29/2018 at 0800 Yes Reported, Patient   lisdexamfetamine dimesylate (VYVANSE) 10 MG capsule Take 10 mg by mouth every morning  12/29/2018 at AM Yes Reported, Patient   mirtazapine (REMERON) 15 MG tablet Take 15 mg by mouth At Bedtime 12/28/2018 at PM Yes Unknown, Entered By History   Omega-3 Fatty Acids (FISH OIL) 1200 MG capsule Take 2,400 mg by mouth daily 12/29/2018 at AM Yes Unknown, Entered By History   omeprazole (PRILOSEC) 20 MG CR capsule Take 20 mg by mouth 2 times daily  12/29/2018 at AM Yes Reported, Patient   spironolactone (ALDACTONE) 100 MG tablet Take 100 mg by mouth daily 12/29/2018 at AM Yes Unknown, Entered By History   valACYclovir (VALTREX) 1000 mg tablet TAKE 1 TABLET BY MOUTH 2 TIMES DAILY AS NEEDED FOR COLD SORES. FOR 5 DAYS More than a month at   Reported, Patient       Current hospital administered medication list (MAR) also reviewed.          Review of Systems:   A comprehensive greater than 10 system review of systems was carried out.  Pertinent positives and negatives are noted above.  Otherwise negative for contributory info.            Physical Exam:   Blood pressure 103/61, pulse 95, temperature 97.6  F (36.4  C), temperature source Oral, resp. rate 16, height 1.626 m (5' 4.02\"), weight 88.9 kg (196 lb), last menstrual period 06/05/2017, SpO2 99 %, not currently breastfeeding.  Exam:  GENERAL: No apparent distress. Awake, alert, and fully oriented.  HEENT: Normocephalic, atraumatic. Extraocular movements intact.  CARDIOVASCULAR: Regular rate and rhythm without murmurs or rubs. No S3.  PULMONARY: Clear bilaterally.  ABDOMINAL: Soft, non-tender, non-distended. Bowel " sounds normoactive. No hepatosplenomegaly.  EXTREMITIES: No cyanosis or clubbing. No edema.  NEUROLOGICAL: CN 2-12 grossly intact, no focal neurological deficits.  DERMATOLOGICAL: No rash, ulcer, ecchymoses, jaundice.         Data:   Laboratory: Personally reviewed available data.  Recent Labs   Lab 12/30/18  0625 12/30/18  0131 12/29/18  2023 12/29/18  1514   WBC  --   --   --  7.8   HGB 9.7* 9.8* 10.5* 12.5   HCT  --   --   --  39.5   MCV  --   --   --  91   PLT  --   --   --  347     Recent Labs   Lab 12/30/18  0625 12/29/18  1514 12/29/18  1512   NA  --  140 141   POTASSIUM  --  3.6 3.6   CHLORIDE  --  105 102   CO2  --  27  --    ANIONGAP  --  8 14   GLC 95 104* 107*   BUN  --  16 16   CR  --  0.67  --    GFRESTIMATED  --  >90 >90   GFRESTBLACK  --  >90 >90   SUMEET  --  9.2  --      No results for input(s): CULT in the last 168 hours.    Imaging: Personally reviewed available data.  Recent Results (from the past 24 hour(s))   US Upper Extremity Venous Duplex Right    Narrative    US UPPER EXTREMITY VENOUS DUPLEX RIGHT  12/30/2018 2:11 PM     HISTORY: swelling just above the IV site (IV has been removed) h/o  blood clot due to IV requiring anticoagulation for months    COMPARISON: None.    TECHNIQUE: Examination of the upper extremity veins was performed with  graded compression and 2-D ultrasound and color doppler spectral  waveform analysis.     FINDINGS:  There is no evidence of thrombosis of the axillary,  brachial, or cephalic veins.  There is thrombus in the right basilic  vein in the forearm.      Impression    IMPRESSION:   1. Occlusive thrombus is seen in the right basilic vein in the  forearm. This is considered a superficial vein.  2. The study is otherwise negative for thrombus. No thrombus is seen  in the deep venous system.    MD Neel MCDERMOTT DO MPH  [facility] Hospitalist  [address] [facility] [place] [address]  Pager: [phone]  12/30/2018

## 2018-12-30 NOTE — PLAN OF CARE
Vitals: VSS except tachycardia on RA. -115. Provider notified.  Pain: Controlled on PO Tylenol and ibuprofen.  Output: Voiding.  Bowel Sounds: Audible and active  Flatus: Denies passing flatus  Diet: Tolerating regular diet  Activity: Up with stand-by assist to bathroom  Update: Oneida-pad in place. Scant amount of serosanguinous/pink drainage. Hemoglobin 10.5. Provider notified of hemoglobin.  Plan: Continue IV fluids overnight. Encourage rest. Hemoglobin redraw in AM. Pain control as needed. Monitor for signs of bleeding.     at bedside. Will continue to monitor and provide for cares.

## 2018-12-30 NOTE — OP NOTE
Procedure Date: 12/29/2018 1978  PREOPERATIVE DIAGNOSIS:  Sudden heavy vaginal bleeding starting this afternoon, status post total laparoscopic hysterectomy, bilateral salpingectomy on 12/11/2018.      POSTOPERATIVE DIAGNOSIS:  Sudden heavy vaginal bleeding starting this afternoon, status post total laparoscopic hysterectomy, bilateral salpingectomy on 12/11/2018.      PROCEDURE PERFORMED:   Examination under anesthesia, placement of sutures along the vaginal cuff.      SURGEON:  Bev Fischer MD      ANESTHESIA:  General.      ESTIMATED BLOOD LOSS:  A few milliliters and several hundred milliliters prior to the surgery.      COMPLICATIONS:  None.      DESCRIPTION OF PROCEDURE:  The patient was given the risks and benefits and because of the sudden onset of heavy bleeding, she and her  agreed to proceed with the proposed surgery.  The patient was brought to the operating room where adequate general anesthesia was administered by the anesthesia team.  The patient was placed in the dorsal lithotomy position using Drew stirrups.  Underwear with a pad and gauze was removed.  It was soaked with blood and there were several large clots.  No bright red new bleeding was noted.  The patient was prepped and draped in the usual sterile manner.  A timeout was taken.  A Bhandari catheter was placed into the bladder.  Clear urine was noted.  A bivalve speculum was placed in the vagina.  There was no active bleeding noted at this time except for a little bit of oozing along the edges of the vaginal cuff.  Further inspection revealed that along the left side of the vaginal cuff, there was a small blood clot representing the previous heavy area of bleeding.  This was reinforced with 0 Vicryl suture in a figure-of-eight x2 stitches.  The rest of the vaginal cuff was reapproximated using the same suture figure-of-eight.  Approximately 6 more stitches were placed.  Hemostasis was noted.  The vagina was irrigated and  suctioned with normal saline and 50 mL of clear urine was noted in the Bhandari catheter bag.  The catheter was removed.        The patient was placed in the supine position.  Of note, pneumo boots were placed during the surgery and activated.  The patient was breathing spontaneously and moving all 4 extremities upon discharge from the operating room.  Sponge counts were correct at the end of procedure.         ROSALEE GUTIERREZ MD             D: 2018   T: 2018   MT: MAANDA      Name:     MITZI KNOWLES   MRN:      [id number]        Account:        [id number]   :      1980           Procedure Date: 2018      Document: [id number]

## 2018-12-30 NOTE — PROGRESS NOTES
Doing well except for throat discomfort due to difficult intubation. Will resolve without intervention. Notes swelling above the IV site in the right arm. Concerned due to h/o blood clot due to IV in the left arm 2 years ago. Required anticoagulation for several months. Of note Aga is heterozygous for Factor V Leiden.    Having no more bleeding. Denies pain, dizziness, SOB, Fevers/chills, n/v.    Hgb stable 12, 10.5 9.8 and 9.7    vss afeb  Abdomen soft and non tender  Right arm swelling just above the IV site. Appears to be IV fluid infiltrate    A: POD 1 s/p resuturing of vaginal cuff for post op bleeding      Anemia due to acute blood loss= no further bleeding since the surgery      Swelling at IV site right arm     P: will obtain doppler of the IV site     Discharge home if doppler negative      Precautions reviewed      RTC as scheduled with Dr. Justice

## 2018-12-30 NOTE — PROVIDER NOTIFICATION
Dr. Fischer notified of patient hemoglobin of 10.5, tachycardia of -115, and patient refusing capnography. Hemoglobin redraw ordered for AM. Will continue to monitor HR with continuous pulse ox.

## 2018-12-30 NOTE — PHARMACY-ADMISSION MEDICATION HISTORY
Admission medication history interview status for this patient is complete. See [facility] admission navigator for allergy information, prior to admission medications and immunization status.     Medication history interview source(s):Patient  Medication history resources (including written lists, pill bottles, clinic record):Care everywhere, Chart review 12/10 visit  Primary pharmacy:[facility]    Changes made to PTA medication list:  Added: Xanax, Zyrtec, Mirtazipine, Azelastine  Deleted: Wellbutrin, Flexeril, Advil, Meclizine  Changed: Clindamycin cream 2%->1%, Prist 100mg->150mg, fish oil 300mg->2400mg, Omeprazole every day->BID     Actions taken by pharmacist (provider contacted, etc):None     Additional medication history information:Patient has a new prescription for Vyvanse 20 mg that she will be starting once it comes in, on mail order. Currently the patient is still taking the 10 mg every day Vyvanse.    Medication reconciliation/reorder completed by provider prior to medication history? No    Do you take OTC medications (eg tylenol, ibuprofen, fish oil, eye/ear drops, etc)? Y(Y/N)    For patients on insulin therapy: N (Y/N)        Prior to Admission medications    Medication Sig Last Dose Taking? Auth Provider   ALPRAZolam (XANAX) 0.25 MG tablet Take 0.25 mg by mouth nightly as needed for anxiety Past Week at NA Yes Unknown, Entered By History   Azelastine HCl 137 MCG/SPRAY SOLN Spray 2 sprays in nostril daily 2 SPRAYS INTO EACH NOSTRIL DAILY 12/28/2018 at AM Yes Unknown, Entered By History   Biotin 10 MG TABS tablet Take 10 mg by mouth daily  12/29/2018 at 0800 Yes Reported, Patient   cetirizine (ZYRTEC ALLERGY) 10 MG tablet Take 10 mg by mouth daily 12/29/2018 at AM Yes Unknown, Entered By History   Cholecalciferol (VITAMIN D3) 1000 UNITS CAPS Take 1,000 Units by mouth daily  12/29/2018 at 0800 Yes Reported, Patient   clindamycin (CLEOCIN T) 1 % external lotion Apply 1 g topically daily as needed Past Month at na  Yes Unknown, Entered By History   desvenlafaxine succinate (PRISTIQ) 100 MG 24 hr tablet Take 150 mg by mouth every morning  12/29/2018 at 0800 Yes Reported, Patient   lactobacillus rhamnosus, GG, (CULTURELL) capsule Take 1 capsule by mouth daily  12/29/2018 at 0800 Yes Reported, Patient   lisdexamfetamine dimesylate (VYVANSE) 10 MG capsule Take 10 mg by mouth every morning  12/29/2018 at AM Yes Reported, Patient   mirtazapine (REMERON) 15 MG tablet Take 15 mg by mouth At Bedtime 12/28/2018 at PM Yes Unknown, Entered By History   Omega-3 Fatty Acids (FISH OIL) 1200 MG capsule Take 2,400 mg by mouth daily 12/29/2018 at AM Yes Unknown, Entered By History   omeprazole (PRILOSEC) 20 MG CR capsule Take 20 mg by mouth 2 times daily  12/29/2018 at AM Yes Reported, Patient   spironolactone (ALDACTONE) 100 MG tablet Take 100 mg by mouth daily 12/29/2018 at AM Yes Unknown, Entered By History   valACYclovir (VALTREX) 1000 mg tablet TAKE 1 TABLET BY MOUTH 2 TIMES DAILY AS NEEDED FOR COLD SORES. FOR 5 DAYS More than a month at NA  Reported, Patient

## 2018-12-30 NOTE — PLAN OF CARE
Vital Signs: VSS except pt hypotensive overnight with some tachycardia. Lowest BP was 78/44 w/ a MAP of 59. Most recent BP was 86/56 w/ a MAP of 65.  Pain/Comfort: Rated pain 0-3/10. Gave one dose of tylenol over night  Assessment: WDL except scant amount of serosanguinous vaginal drainage.  Diet: Regular-minimal intake over night d/t patient sleeping  Output: Has not voided overnight. Patient has been sleeping  Activity/Ambulation: Up with SBA  Plan: Plan to recheck hemoglobin the AM, continue to assess VS Q4H unless patient symptomatic, then more frequent, will continue to monitor and provide supportive therapies as needed.

## 2019-01-24 ENCOUNTER — TRANSFERRED RECORDS (OUTPATIENT)
Dept: HEALTH INFORMATION MANAGEMENT | Facility: CLINIC | Age: 39
End: 2019-01-24

## 2019-02-05 ENCOUNTER — TELEPHONE (OUTPATIENT)
Dept: SURGERY | Facility: CLINIC | Age: 39
End: 2019-02-05

## 2019-02-05 NOTE — TELEPHONE ENCOUNTER
"Patient interested in weight loss surgery.    Name: Aga Alanis      Ht: 5' 4\"    Wt: 202 lbs    BMI: 34    Spoke with patient regarding appointment for Wednesday 2/13/19.    Instructed to check with insurance company regarding exclusions prior to first appt.    Scheduled to see Zuri Dodd PA-C at 9:15 am followed by an appt with a dietician at 10 am.      Instructed to view seminar and complete pre-visit questionnaires prior to visit at [email or web address].    [phone] Contact Center phone number  Shalini Thompson CMA      "

## 2019-02-07 ENCOUNTER — TRANSFERRED RECORDS (OUTPATIENT)
Dept: HEALTH INFORMATION MANAGEMENT | Facility: CLINIC | Age: 39
End: 2019-02-07

## 2019-02-13 ENCOUNTER — OFFICE VISIT (OUTPATIENT)
Dept: SURGERY | Facility: CLINIC | Age: 39
End: 2019-02-13
Payer: COMMERCIAL

## 2019-02-13 VITALS
HEART RATE: 108 BPM | WEIGHT: 215.6 LBS | OXYGEN SATURATION: 99 % | TEMPERATURE: 98.2 F | DIASTOLIC BLOOD PRESSURE: 90 MMHG | BODY MASS INDEX: 38.2 KG/M2 | SYSTOLIC BLOOD PRESSURE: 131 MMHG | HEIGHT: 63 IN

## 2019-02-13 DIAGNOSIS — E66.812 OBESITY, CLASS II, BMI 35-39.9: ICD-10-CM

## 2019-02-13 DIAGNOSIS — E66.812 CLASS 2 OBESITY: Primary | ICD-10-CM

## 2019-02-13 LAB
ALBUMIN SERPL-MCNC: 3.8 G/DL (ref 3.4–5)
ALP SERPL-CCNC: 88 U/L (ref 40–150)
ALT SERPL W P-5'-P-CCNC: 26 U/L (ref 0–50)
ANION GAP SERPL CALCULATED.3IONS-SCNC: 6 MMOL/L (ref 3–14)
AST SERPL W P-5'-P-CCNC: 17 U/L (ref 0–45)
BILIRUB SERPL-MCNC: 0.2 MG/DL (ref 0.2–1.3)
BUN SERPL-MCNC: 13 MG/DL (ref 7–30)
CALCIUM SERPL-MCNC: 9.2 MG/DL (ref 8.5–10.1)
CHLORIDE SERPL-SCNC: 106 MMOL/L (ref 94–109)
CO2 SERPL-SCNC: 26 MMOL/L (ref 20–32)
CREAT SERPL-MCNC: 0.58 MG/DL (ref 0.52–1.04)
DEPRECATED CALCIDIOL+CALCIFEROL SERPL-MC: 29 UG/L (ref 20–75)
ERYTHROCYTE [DISTWIDTH] IN BLOOD BY AUTOMATED COUNT: 14.5 % (ref 10–15)
GFR SERPL CREATININE-BSD FRML MDRD: >90 ML/MIN/{1.73_M2}
GLUCOSE SERPL-MCNC: 86 MG/DL (ref 70–99)
HBA1C MFR BLD: 5.6 % (ref 0–5.6)
HCT VFR BLD AUTO: 38.8 % (ref 35–47)
HGB BLD-MCNC: 11.8 G/DL (ref 11.7–15.7)
MCH RBC QN AUTO: 27.3 PG (ref 26.5–33)
MCHC RBC AUTO-ENTMCNC: 30.4 G/DL (ref 31.5–36.5)
MCV RBC AUTO: 90 FL (ref 78–100)
PLATELET # BLD AUTO: 325 10E9/L (ref 150–450)
POTASSIUM SERPL-SCNC: 4.6 MMOL/L (ref 3.4–5.3)
PROT SERPL-MCNC: 8.3 G/DL (ref 6.8–8.8)
PTH-INTACT SERPL-MCNC: 51 PG/ML (ref 18–80)
RBC # BLD AUTO: 4.33 10E12/L (ref 3.8–5.2)
SODIUM SERPL-SCNC: 138 MMOL/L (ref 133–144)
WBC # BLD AUTO: 6.9 10E9/L (ref 4–11)

## 2019-02-13 RX ORDER — TOPIRAMATE 25 MG/1
TABLET, FILM COATED ORAL
Qty: 90 TABLET | Refills: 3 | Status: SHIPPED | OUTPATIENT
Start: 2019-02-13 | End: 2019-03-18

## 2019-02-13 ASSESSMENT — MIFFLIN-ST. JEOR: SCORE: 1616.05

## 2019-02-13 NOTE — PROGRESS NOTES
"New Bariatric Surgery Consultation Note    2019    RE: Aga Alanis  MR#: [id number]  : 1980      Referring provider: Self referred    Chief Complaint/Reason for visit: evaluation for possible weight loss surgery    Dear Michael Abbott (General),    I had the pleasure of seeing your patient, Aga Alanis, to evaluate her obesity and consider her for possible weight loss surgery. As you know, Aga Alanis is 39 year old.  She has a height of 5' 3.25\", a weight of 215 lbs 9.6 oz, and calculated Body mass index is 37.89 kg/m .    HISTORY OF PRESENT ILLNESS:  Weight Loss History Reviewed with Patient 2/10/2019   How long have you been overweight? Since late 20's to early 40's   What is the most that you have ever weighed? 212   What is the most weight you have lost? 40   I have tried the following methods to lose weight Watching portions or calories, Exercise, Weight Watchers, Atkins type diet (low carb/high protein), OTC Medications   I have tried the following weight loss medications? (Check all that apply) None   Have you ever had weight loss surgery? No     Strong family history of obesity and diabetes.   Best friend had sleeve surgery recently  She is on Vyvanse for ADHD  Has taken bupropion for depression in the past for 10 years. She had suicidal thoughts on higher doses so changed to pristiq and mirtazapine    CO-MORBIDITIES OF OBESITY INCLUDE:     2/10/2019   I have the following co-morbidities associated with obesity: Sleep Apnea, Polycystic Ovarian Syndrome, Weight Bearing Joint Pain   Do you use a CPAP? Yes     PAST MEDICAL HISTORY:  Past Medical History:   Diagnosis Date     DVT (deep venous thrombosis) (H)      Ovarian cyst    Hx of DVT in upper arm in 2017 after IV and was on coumadin for 3 months  She is seeing hematologist now. She is getting IV iron transfusion. Blood clots thought to be IV induced.    PAST SURGICAL HISTORY:  Past Surgical History: "   Procedure Laterality Date     COSMETIC SURGERY      breast augmentation and tummy [facility] 2017     ENT SURGERY      tubes      EXAM UNDER ANESTHESIA PELVIC N/A 12/29/2018    Procedure: Vaginal exam under anesthesia, repair of vaginal cuff;  Surgeon: Bev Fischer MD;  Location: [facility] OR     GYN SURGERY      ovarian cyst , tubes tied      HYSTERECTOMY       ORTHOPEDIC SURGERY      knee right      REPAIR LACERATION VAGINA N/A 12/29/2018    Procedure: Repair of vaginal laceration;  Surgeon: Bev Fischer MD;  Location: [facility] OR       FAMILY HISTORY:   Obesity  Type 2 DM    SOCIAL HISTORY:   Social History Questions Reviewed With Patient 2/10/2019   Which best describes your employment status (select all that apply) I work full-time   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 Yes   Who do you have in your support network that can be available to help you for the first 2 weeks after surgery? yes   Who can you count on for support throughout your weight loss surgery journey? my , both in laws, aunt, children   Can you afford 3 meals a day?  Yes   Can you afford 50-60 dollars a month for vitamins? Yes   23, 15, 14, 13 and 10 Children and stepchildren    HABITS:     2/10/2019   How often do you drink alcohol? Monthly or less   If you do drink alcohol, how many drinks might you have in a day? (one drink = 5 oz. wine, 1 can/bottle of beer, 1 shot liquor) 1 or 2   Have you ever used any of the following nicotine products? No   Have you or are you currently using street drugs or prescription strength medication for which you do not have a prescription for? No   Do you have a history of chemical dependency (alcohol or drug abuse)? No       PSYCHOLOGICAL HISTORY:   Psychological History Reviewed With Patient 2/10/2019   Have you ever attempted suicide? Never.   Have you had thoughts of suicide in the past year? No   Have you ever been  hospitalized for mental illness or a suicide attempt? Never.   Do you have a history of chronic pain? No   Have you ever been diagnosed with fibromyalgia? No   Are you currently being treated for any of the following? (select all that apply) Depression, Anxiety   Are you currently seeing a therapist or counselor?  No   Are you currently seeing a psychiatrist? Yes       ROS:     2/10/2019   Skin:  None of the above   HEENT: Headaches   Musculoskeletal: None of the above   Cardiovascular: None of the above   Pulmonary: Snoring, Awaken from sleep to catch your breath, People have told me I stop breathing while asleep, Excessively sleep during the day   Gastrointestinal: Heartburn, Reflux   Genitourinary: Stress incontinence (losing urine when coughing, sneezing, etc.)   Hematological: Clotting problems, Deep vein thrombosis (DVT), Family history of blood clots   Neurological: None of the above   Female only: Loss of menstrual cycles, Excessive menstrual bleeding, Irregular menstrual cycles, Polycystic ovarian syndrome (PCOS)       EATING BEHAVIORS:     2/10/2019   Have you or anyone else thought that you had an eating disorder? No   Do you currently binge eat (eat a large amount of food in a short time)? No   Are you an emotional eater? No   Do you get up to eat after falling asleep? No       EXERCISE:     2/10/2019   How often do you exercise? Less than 1 time per week   What is the duration of your exercise (in minutes)? 30 Minutes   What types of exercise do you do? walking, home gym   What keeps you from being more active?  I have recently been sick, Too tired       MEDICATIONS:  Current Outpatient Medications   Medication Sig Dispense Refill     ALPRAZolam (XANAX) 0.25 MG tablet Take 0.25 mg by mouth nightly as needed for anxiety       Azelastine HCl 137 MCG/SPRAY SOLN Spray 2 sprays in nostril daily 2 SPRAYS INTO EACH NOSTRIL DAILY       Biotin 10 MG TABS tablet Take 10 mg by mouth daily        cetirizine (ZYRTEC  "ALLERGY) 10 MG tablet Take 10 mg by mouth daily       Cholecalciferol (VITAMIN D3) 1000 UNITS CAPS Take 1,000 Units by mouth daily        clindamycin (CLEOCIN T) 1 % external lotion Apply 1 g topically daily as needed       desvenlafaxine succinate (PRISTIQ) 100 MG 24 hr tablet Take 150 mg by mouth every morning        lactobacillus rhamnosus, GG, (CULTURELL) capsule Take 1 capsule by mouth daily        lisdexamfetamine dimesylate (VYVANSE) 10 MG capsule Take 20 mg by mouth every morning        mirtazapine (REMERON) 15 MG tablet Take 30 mg by mouth At Bedtime        Omega-3 Fatty Acids (FISH OIL) 1200 MG capsule Take 2,400 mg by mouth daily       omeprazole (PRILOSEC) 20 MG CR capsule Take 20 mg by mouth 2 times daily        spironolactone (ALDACTONE) 100 MG tablet Take 100 mg by mouth daily       valACYclovir (VALTREX) 1000 mg tablet TAKE 1 TABLET BY MOUTH 2 TIMES DAILY AS NEEDED FOR COLD SORES. FOR 5 DAYS         ALLERGIES:  Allergies   Allergen Reactions     Codeine Sulfate      Demerol      Sulfa Drugs      Flagyl [Metronidazole] Rash       LABS/IMAGING/MEDICAL RECORDS REVIEW:             PHYSICAL EXAM:    /90   Pulse 108   Temp 98.2  F (36.8  C) (Oral)   Ht 1.607 m (5' 3.25\")   Wt 97.8 kg (215 lb 9.6 oz)   LMP 06/05/2017   SpO2 99%   BMI 37.89 kg/m    General: NAD  Neurologic: A & O x 3, gait normal  Head: normocephalic, atraumatic  HEENT: PERRL, EOMI.   Respiratory: respirations unlabored  Abdomen: Obese, Soft NT ND   Extremities: No LE swelling   Skin: warm and dry.  No rashes on exposed skin  Psychiatric: Mentation and Affect appear normal      In summary, Aga Alanis has Class III obesity with a body mass index of Body mass index is 37.89 kg/m . kg/m2 and the comorbidities stated above. She completed an informational seminar and is a candidate for the laparoscopic sleeve gastrectomy and hiatal hernia repair.  She will have to complete the following pre-requisites:  If you have not " "already watched our online seminar please go to [email or web address]    See dietitian in 1 month and monthly for 6 months    Bariatric labs today first floor    Schedule bariatric psych eval as soon as possible  Call Jose Ferguson at [phone] to help with scheduling with Josselin Zhang if needed.    Will need hematology consult    Bariatric Task List  Status:  Is patient a candidate for bariatric surgery?:  patient is a candidate for bariatric surgery -     Cleared to schedule surgeon consult?:    -     Status:  surgery evaluation in process -     Surgeon: Dr Dee -     Tentative surgery month/year: August 2019 -        Insurance: Insurance:  [employer] -        Patient Info: Initial Weight:  215 -     Date of Initial Weight/Height:  2/13/2019 -     Goal Weight (lbs):  205 -     Required Weight Loss:  10 -     Surgery Type:  sleeve gastrectomy -     Multidisciplinary Meeting:    -        Dietician Visits: Structured weight loss required by insurance?:  structured weight loss required -     Dietician Visit 1:  Completed -     Dietician Visit 2:  Needed -     Dietician Visit 3:  Needed -     Dietician Visit 4:  Needed -     Dietician Visit 5:  Needed -     Dietician Visit 6:  Needed -        Psychological Evaluation: Psych eval:  Needed -     Psychiatrist letter of support:  Needed -        Lab Work: Complete Blood Count:  Needed -     Comprehensive Metabolic Panel:  Needed -     Vitamin D:  Needed -     Hgb A1c:  Needed -     PTH:  Needed -        Consults/ Clearance: Sleep Medicine:  Needed -     Hematology:  Needed -        Testing: EGD:  Completed -        PCP: Establish care with PCP:  Completed -     PCP letter of support:  Needed -        Smoking:    Patient Education:  Information Session:  Completed -     Given \"Making your decision\" handout?:  Given \"\"Making your decision\"\" handout? -     Given support group information?:  support group contact information provided -     Attended support group?:    -     Support " plan in place?:  Completed -     Research consents signed?:  research consent signed -        Final Tasks:  Before surgery online class:  Needed -     Before surgery online class website link:  [email or web address]   After surgery online class:  Needed -     After surgery online class website link:  [email or web address]   Nurse visit for weigh-in and information:  Needed -     Pre-assessment clinic visit with anesthesia team for H&P:  Needed -     Final labs (Hgb, plt, T&S, UA):  Needed -        Notes:   -       Today in the office we discussed the duodenal switch surgery.  More specific risks related to duodenal switch were detailed at the bariatric informational seminar and include the followin.) leak at the vertical sleeve staple line, 2.) leak at the anastomoses, 3.) stricture at the duodenoileostomy anastomosis or along the vertical staple line, 4.) nausea, vomiting, and dehydration for several months, 5.) internal hernia or adhesions causing bowel obstruction, 6.) rapid weight loss causing a higher rate of gallstone formation during the first 6 months after surgery, 7.) difficulty accessing the duodenum for the rest of life, 8) decreased absorption of vitamins because of the altered anatomy, 9.) protein malnutrition b/c of poor absorption.      Once the patient has completed the requirements in their task list and there are no further recommendations, the pt will be allowed to see the surgeon of her choice for consultation on the laparoscopic gastric sleeve surgery. Patient verbalizes understanding of the process to surgery and expectations for the postoperative period including the need for lifelong lifestyle changes, vitamin supplementation, and laboratory monitoring.      Sincerely,       Zuri Dodd PA-C    I spent a total of 45 minutes face to face with the patient during today's office visit. Over 50% of this time was spent counseling the patient and/or  coordinating care.

## 2019-02-13 NOTE — LETTER
"2/13/2019       RE: Aga Alanis  [address] [place] [address]     Dear Colleague,    Thank you for referring your patient, Aga Alanis, to the [facility]NA[facility] at [facility]. Please see a copy of my visit note below.    New Bariatric Nutrition Consultation Note    Reason For Visit: Nutrition Assessment    Aga Alanis is a 39 year old presenting today for new bariatric nutrition consult.  Pt is interested in laparoscopic sleeve gastrectomy with Dr. Roberts expected surgery in August 2019.  Patient is accompanied by self.  This is pt's first of 6 required nutrition visits prior to surgery. Pt referred by RICHARD Dumont (2/13/19).       Support System Reviewed With Patient 2/10/2019   Who do you have in your support network that can be available to help you for the first 2 weeks after surgery? yes   Who can you count on for support throughout your weight loss surgery journey? my , both in laws, aunt, children       ANTHROPOMETRICS:  Estimated body mass index is 37.89 kg/m  as calculated from the following:    Height as of an earlier encounter on 2/13/19: 1.607 m (5' 3.25\").    Weight as of an earlier encounter on 2/13/19: 97.8 kg (215 lb 9.6 oz).     Required weight loss goal pre-op: 205 lbs from initial consult weight (goal weight 10 lbs or less before surgery)       2/10/2019   I have tried the following methods to lose weight Watching portions or calories, Exercise, Weight Watchers, Atkins type diet (low carb/high protein), OTC Medications       Weight Loss Questions Reviewed With Patient 2/10/2019   How long have you been overweight? Since late 20's to early 40's       SUPPLEMENT INFORMATION:  Fish oil, vitamin D 1,000 international unit(s) and probiotic.     Pt is receiving iron infusions.     NUTRITION HISTORY:  Recall Diet Questions Reviewed With Patient 2/10/2019   Describe what you typically " consume for breakfast (typical or most recent): coffee with milk 2% or whole    Describe what you typically consume for lunch (typical or most recent): eat out, or skip    Describe what you typically consume for supper (typical or most recent): pizza, chicken, salad, grilled vegetables and meat in the summer    Describe what you typically consume as snacks (typical or most recent): Crackers (seeded), fruit, candy   How many ounces of water, or other low calorie drinks, do you drink daily (8 oz=1 glass)? 64 oz or more   How many ounces of caffeine (coffee, tea, pop) do you drink daily (8 oz=1 glass)? 16 oz   How many ounces of milk do you drink daily (8 oz=1 glass) 8 oz   Please indicate the type of milk: 2%   How often do you drink alcohol? Monthly or less   If you do drink alcohol, how many drinks might you have in a day? (one drink = 5 oz. wine, 1 can/bottle of beer, 1 shot liquor) 1 or 2       Eating Habits 2/10/2019   Do you have any dietary restrictions? No   Do you currently binge eat (eat a large amount of food in a short time)? No   Are you an emotional eater? No   Do you get up to eat after falling asleep? No   What foods do you crave? sweet, salty, crunchy       ADDITIONAL INFORMATION:      Dining Out History Reviewed With Patient 2/10/2019   How often do you dine out? Nearly every day.   Where do you dine out? (select all that apply) sit-down restaurants, fast food chains, take out   What types of food do you order when you dine out? depends on what I feel like eating try to pick somewhat healthy options       Physical Activity Reviewed With Patient 2/10/2019   How often do you exercise? Less than 1 time per week   What is the duration of your exercise (in minutes)? 30 Minutes   What types of exercise do you do? walking, home gym   What keeps you from being more active?  I have recently been sick, Too tired       NUTRITION DIAGNOSIS:  Obesity r/t long history of self-monitoring deficit and excessive  "energy intake aeb BMI >30 kg/m2.    INTERVENTION:  Intervention Provided/Education Provided on post-op diet guidelines, vitamins/minerals essential post-operatively, GI anatomy of bariatric surgeries, ways to help prepare for post-op diet guidelines pre-operatively, portion/calorie-control, mindful eating, sources of protein.  Provided pt with list of goals RD contact information.      Questions Reviewed With Patient 2/10/2019   How ready are you to make changes regarding your weight? Number 1 = Not ready at all to make changes up to 10 = very ready. 10   How confident are you that you can change? 1 = Not confident that you will be successful making changes up to 10 = very confident. 10       Patient Understanding: good  Expected Compliance: good    GOALS:  Relating To Eating:  Eat slowly (20-30 minutes per meal), chewing foods well (25 chews per bite/applesauce consistency). Stop as soon as you feel satisfied.   9\" Plate method (1/2 plate non-starchy vegetables/fruit, 1/4 plate lean protein, 1/4 plate whole grain starch - no more than 1/2 cup carb/meal)  Eat 3 meals per day    Relating to beverages:  Reduce caffeine/carbonation/calorie containing beverages  Separate fluids from meals by 30 minutes before, during, and after eating    Relating to dietary supplements:  Start a multivitamin containing iron daily    Relating to activity:  Increase activity as able    Relating to cravings:  Anytime you have a craving, find an activity (such as calling a friend, take a walk, read, check email. ) 15 minutes to see if craving goes away    Follow-Up:   Recommend 1 month  follow up visits to assist with lifestyle changes or per insurance.      Time spent with patient: 30 minutes.    Again, thank you for allowing me to participate in the care of your patient.      Sincerely,    Leila Garces RD      "

## 2019-02-13 NOTE — LETTER
"2019       RE: Aga Alanis  [id number]  [facility] [address]     Dear Colleague,    Thank you for referring your patient, Aga Alanis, to the [facility] SURGICAL WEIGHT MANAGEMENT at [facility]. Please see a copy of my visit note below.        Again, thank [facility] Bariatric Surgery Consultation Note    2019    RE: Aga Alanis  MR#: [id number]  : 1980      Referring provider: Self referred    Chief Complaint/Reason for visit: evaluation for possible weight loss surgery    Dear Michael Abbott (General),    I had the pleasure of seeing your patient, Aga Alanis, to evaluate her obesity and consider her for possible weight loss surgery. As you know, Aga Alanis is 39 year old.  She has a height of 5' 3.25\", a weight of 215 lbs 9.6 oz, and calculated Body mass index is 37.89 kg/m .    HISTORY OF PRESENT ILLNESS:  Weight Loss History Reviewed with Patient 2/10/2019   How long have you been overweight? Since late 20's to early 40's   What is the most that you have ever weighed? 212   What is the most weight you have lost? 40   I have tried the following methods to lose weight Watching portions or calories, Exercise, Weight Watchers, Atkins type diet (low carb/high protein), OTC Medications   I have tried the following weight loss medications? (Check all that apply) None   Have you ever had weight loss surgery? No     Strong family history of obesity and diabetes.   Best friend had sleeve surgery recently  She is on Vyvanse for ADHD  Has taken bupropion for depression in the past for 10 years. She had suicidal thoughts on higher doses so changed to pristiq and mirtazapine    CO-MORBIDITIES OF OBESITY INCLUDE:     2/10/2019   I have the following co-morbidities associated with obesity: Sleep Apnea, Polycystic Ovarian Syndrome, Weight Bearing Joint Pain   Do you use a CPAP? Yes     PAST MEDICAL " HISTORY:  Past Medical History:   Diagnosis Date     DVT (deep venous thrombosis) (H)      Ovarian cyst    Hx of DVT in upper arm in 2017 after IV and was on coumadin for 3 months  She is seeing hematologist now. She is getting IV iron transfusion. Blood clots thought to be IV induced.    PAST SURGICAL HISTORY:  Past Surgical History:   Procedure Laterality Date     COSMETIC SURGERY      breast augmentation and tummy tuck 2017     ENT SURGERY      tubes      EXAM UNDER ANESTHESIA PELVIC N/A 12/29/2018    Procedure: Vaginal exam under anesthesia, repair of vaginal cuff;  Surgeon: Bev Fischer MD;  Location: [facility] OR     GYN SURGERY      ovarian cyst , tubes tied      HYSTERECTOMY       ORTHOPEDIC SURGERY      knee right      REPAIR LACERATION VAGINA N/A 12/29/2018    Procedure: Repair of vaginal laceration;  Surgeon: Bev Fischer MD;  Location: [facility] OR       FAMILY HISTORY:   Obesity  Type 2 DM    SOCIAL HISTORY:   Social History Questions Reviewed With Patient 2/10/2019   Which best describes your employment status (select all that apply) I work full-time   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 Yes   Who do you have in your support network that can be available to help you for the first 2 weeks after surgery? yes   Who can you count on for support throughout your weight loss surgery journey? my , both in laws, aunt, children   Can you afford 3 meals a day?  Yes   Can you afford 50-60 dollars a month for vitamins? Yes   23, 15, 14, 13 and 10 Children and stepchildren    HABITS:     2/10/2019   How often do you drink alcohol? Monthly or less   If you do drink alcohol, how many drinks might you have in a day? (one drink = 5 oz. wine, 1 can/bottle of beer, 1 shot liquor) 1 or 2   Have you ever used any of the following nicotine products? No   Have you or are you currently using street drugs or prescription strength  medication for which you do not have a prescription for? No   Do you have a history of chemical dependency (alcohol or drug abuse)? No       PSYCHOLOGICAL HISTORY:   Psychological History Reviewed With Patient 2/10/2019   Have you ever attempted suicide? Never.   Have you had thoughts of suicide in the past year? No   Have you ever been hospitalized for mental illness or a suicide attempt? Never.   Do you have a history of chronic pain? No   Have you ever been diagnosed with fibromyalgia? No   Are you currently being treated for any of the following? (select all that apply) Depression, Anxiety   Are you currently seeing a therapist or counselor?  No   Are you currently seeing a psychiatrist? Yes       ROS:     2/10/2019   Skin:  None of the above   HEENT: Headaches   Musculoskeletal: None of the above   Cardiovascular: None of the above   Pulmonary: Snoring, Awaken from sleep to catch your breath, People have told me I stop breathing while asleep, Excessively sleep during the day   Gastrointestinal: Heartburn, Reflux   Genitourinary: Stress incontinence (losing urine when coughing, sneezing, etc.)   Hematological: Clotting problems, Deep vein thrombosis (DVT), Family history of blood clots   Neurological: None of the above   Female only: Loss of menstrual cycles, Excessive menstrual bleeding, Irregular menstrual cycles, Polycystic ovarian syndrome (PCOS)       EATING BEHAVIORS:     2/10/2019   Have you or anyone else thought that you had an eating disorder? No   Do you currently binge eat (eat a large amount of food in a short time)? No   Are you an emotional eater? No   Do you get up to eat after falling asleep? No       EXERCISE:     2/10/2019   How often do you exercise? Less than 1 time per week   What is the duration of your exercise (in minutes)? 30 Minutes   What types of exercise do you do? walking, home gym   What keeps you from being more active?  I have recently been sick, Too tired  "      MEDICATIONS:  Current Outpatient Medications   Medication Sig Dispense Refill     ALPRAZolam (XANAX) 0.25 MG tablet Take 0.25 mg by mouth nightly as needed for anxiety       Azelastine HCl 137 MCG/SPRAY SOLN Spray 2 sprays in nostril daily 2 SPRAYS INTO EACH NOSTRIL DAILY       Biotin 10 MG TABS tablet Take 10 mg by mouth daily        cetirizine (ZYRTEC ALLERGY) 10 MG tablet Take 10 mg by mouth daily       Cholecalciferol (VITAMIN D3) 1000 UNITS CAPS Take 1,000 Units by mouth daily        clindamycin (CLEOCIN T) 1 % external lotion Apply 1 g topically daily as needed       desvenlafaxine succinate (PRISTIQ) 100 MG 24 hr tablet Take 150 mg by mouth every morning        lactobacillus rhamnosus, GG, (CULTURELL) capsule Take 1 capsule by mouth daily        lisdexamfetamine dimesylate (VYVANSE) 10 MG capsule Take 20 mg by mouth every morning        mirtazapine (REMERON) 15 MG tablet Take 30 mg by mouth At Bedtime        Omega-3 Fatty Acids (FISH OIL) 1200 MG capsule Take 2,400 mg by mouth daily       omeprazole (PRILOSEC) 20 MG CR capsule Take 20 mg by mouth 2 times daily        spironolactone (ALDACTONE) 100 MG tablet Take 100 mg by mouth daily       valACYclovir (VALTREX) 1000 mg tablet TAKE 1 TABLET BY MOUTH 2 TIMES DAILY AS NEEDED FOR COLD SORES. FOR 5 DAYS         ALLERGIES:  Allergies   Allergen Reactions     Codeine Sulfate      Demerol      Sulfa Drugs      Flagyl [Metronidazole] Rash       LABS/IMAGING/MEDICAL RECORDS REVIEW:             PHYSICAL EXAM:    /90   Pulse 108   Temp 98.2  F (36.8  C) (Oral)   Ht 1.607 m (5' 3.25\")   Wt 97.8 kg (215 lb 9.6 oz)   LMP 06/05/2017   SpO2 99%   BMI 37.89 kg/m     General: NAD  Neurologic: A & O x 3, gait normal  Head: normocephalic, atraumatic  HEENT: PERRL, EOMI.   Respiratory: respirations unlabored  Abdomen: Obese, Soft NT ND   Extremities: No LE swelling   Skin: warm and dry.  No rashes on exposed skin  Psychiatric: Mentation and Affect appear " normal  In summary, Aga Alanis has Class III obesity with a body mass index of Body mass index is 37.89 kg/m . kg/m2 and the comorbidities stated above. She completed an informational seminar and is a candidate for the laparoscopic sleeve gastrectomy and hiatal hernia repair.  She will have to complete the following pre-requisites:  If you have not already watched our online seminar please go to [email or web address]    See dietitian in 1 month and monthly for 6 months    Bariatric labs today first floor    Schedule bariatric psych eval as soon as possible  Call Jose Ferguson at [phone] to help with scheduling with Josselin Zhang if needed.    Will need hematology consult    Bariatric Task List  Status:  Is patient a candidate for bariatric surgery?:  patient is a candidate for bariatric surgery -     Cleared to schedule surgeon consult?:    -     Status:  surgery evaluation in process -     Surgeon: Dr Roberts -     Tentative surgery month/year: August 2019 -        Insurance: Insurance:  [employer] -        Patient Info: Initial Weight:  215 -     Date of Initial Weight/Height:  2/13/2019 -     Goal Weight (lbs):  205 -     Required Weight Loss:  10 -     Surgery Type:  sleeve gastrectomy -     Multidisciplinary Meeting:    -        Dietician Visits: Structured weight loss required by insurance?:  structured weight loss required -     Dietician Visit 1:  Completed -     Dietician Visit 2:  Needed -     Dietician Visit 3:  Needed -     Dietician Visit 4:  Needed -     Dietician Visit 5:  Needed -     Dietician Visit 6:  Needed -        Psychological Evaluation: Psych eval:  Needed -     Psychiatrist letter of support:  Needed -        Lab Work: Complete Blood Count:  Needed -     Comprehensive Metabolic Panel:  Needed -     Vitamin D:  Needed -     Hgb A1c:  Needed -     PTH:  Needed -        Consults/ Clearance: Sleep Medicine:  Needed -     Hematology:  Needed -        Testing: EGD:  Completed -       "  PCP: Establish care with PCP:  Completed -     PCP letter of support:  Needed -        Smoking:    Patient Education:  Information Session:  Completed -     Given \"Making your decision\" handout?:  Given \"\"Making your decision\"\" handout? -     Given support group information?:  support group contact information provided -     Attended support group?:    -     Support plan in place?:  Completed -     Research consents signed?:  research consent signed -        Final Tasks:  Before surgery online class:  Needed -     Before surgery online class website link:  [email or web address]   After surgery online class:  Needed -     After surgery online class website link:  [email or web address]   Nurse visit for weigh-in and information:  Needed -     Pre-assessment clinic visit with anesthesia team for H&P:  Needed -     Final labs (Hgb, plt, T&S, UA):  Needed -        Notes:   -       Today in the office we discussed the duodenal switch surgery.  More specific risks related to duodenal switch were detailed at the bariatric informational seminar and include the followin.) leak at the vertical sleeve staple line, 2.) leak at the anastomoses, 3.) stricture at the duodenoileostomy anastomosis or along the vertical staple line, 4.) nausea, vomiting, and dehydration for several months, 5.) internal hernia or adhesions causing bowel obstruction, 6.) rapid weight loss causing a higher rate of gallstone formation during the first 6 months after surgery, 7.) difficulty accessing the duodenum for the rest of life, 8) decreased absorption of vitamins because of the altered anatomy, 9.) protein malnutrition b/c of poor absorption.      Once the patient has completed the requirements in their task list and there are no further recommendations, the pt will be allowed to see the surgeon of her choice for consultation on the laparoscopic gastric sleeve surgery. Patient verbalizes understanding of the " process to surgery and expectations for the postoperative period including the need for lifelong lifestyle changes, vitamin supplementation, and laboratory monitoring.    I spent a total of 45 minutes face to face with the patient during today's office visit. Over 50% of this time was spent counseling the patient and/or coordinating care. for allowing me to participate in the care of your patient.      Sincerely,    Zuri Dodd PA-C

## 2019-02-13 NOTE — PATIENT INSTRUCTIONS
If you have not already watched our online seminar please go to [email or web address]    See dietitian in 1 month and monthly for 6 months    See Mary KING pharmacist in 1 month (same day as RD)    See Zuri in 3 months for pre bariatric surgery visit    Bariatric labs today first floor    Schedule bariatric psych eval as soon as possible  Call Jose Ferguson at [phone] to help with scheduling with Josselin Zhang if needed.    Will need hematology consult    To reach LYNN Hamilton for any questions/concerns call [phone]      Bariatric Task List  Status:  Is patient a candidate for bariatric surgery?:  patient is a candidate for bariatric surgery -     Cleared to schedule surgeon consult?:    -     Status:  surgery evaluation in process -     Surgeon: Dr Roberts -     Tentative surgery month/year: August 2019 -        Insurance: Insurance:  [employer] -        Patient Info: Initial Weight:  215 -     Date of Initial Weight/Height:  2/13/2019 -     Goal Weight (lbs):  205 -     Required Weight Loss:  10 -     Surgery Type:  sleeve gastrectomy -     Multidisciplinary Meeting:    -        Dietician Visits: Structured weight loss required by insurance?:  structured weight loss required -     Dietician Visit 1:  Completed -     Dietician Visit 2:  Needed -     Dietician Visit 3:  Needed -     Dietician Visit 4:  Needed -     Dietician Visit 5:  Needed -     Dietician Visit 6:  Needed -        Psychological Evaluation: Psych eval:  Needed -     Psychiatrist letter of support:  Needed -        Lab Work: Complete Blood Count:  Needed -     Comprehensive Metabolic Panel:  Needed -     Vitamin D:  Needed -     Hgb A1c:  Needed -     PTH:  Needed -        Consults/ Clearance: Sleep Medicine:  Needed -     Hematology:  Needed -        Testing: EGD:  Completed -        PCP: Establish care with PCP:  Completed -     PCP letter of support:  Needed -        Smoking:    Patient Education:  Information Session:  Completed -     Given  "\"Making your decision\" handout?:  Given \"\"Making your decision\"\" handout? -     Given support group information?:  support group contact information provided -     Attended support group?:    -     Support plan in place?:  Completed -     Research consents signed?:  research consent signed -        Additional Surgery Requirements: Review Coag plan:    -        Final Tasks:  Before surgery online class:  Needed -     Before surgery online class website link:  [email or web address]   After surgery online class:  Needed -     After surgery online class website link:  [email or web address]   Nurse visit for weigh-in and information:  Needed -     Pre-assessment clinic visit with anesthesia team for H&P:  Needed -     Final labs (Hgb, plt, T&S, UA):  Needed -        Notes:   -         MEDICATION STARTED AT THIS APPOINTMENT  We are starting topiramate at bedtime.  Start one tab, 25 mg, for a week. Go up to 50 mg (2 tabs) for the next week. At the third week, take   3 tabs (75 mg).  Stay at 3 tabs until you are seen again. Call the nurse at [phone] if you have any questions or concerns. (Do not stop taking it if you don't think it's working. For some people it works even though they do not feel much different.)    Topiramate (Topamax) is a medication that is used most often to treat migraine headaches or for seizures. It has also been found to help with weight loss. Although it's not currently FDA approved for weight loss, it has been used safely for a number of years to help people who are carrying extra weight.     Just how topiramate helps with weight loss has not been exactly determined. However it seems to work on areas of the brain to quiet down signals related to eating.      Topiramate may make you:    >feel less interest in eating in between meals   >think less about food and eating   >find it easier to push the plate away   >find giving up pop easier    >have an easier time " eating less    For some of our patients, the pills work right away. They feel and think quite differently about food. Other patients don't feel much of a change but find in fact they have lost weight! Like all weight loss medications, topiramate works best when you help it work.  This means:    1) Have less tempting high calorie (fattening) food around the house or office    2) Have lower calorie food (fruits, vegetables,low fat meats and dairy) for snacks    3) Eat out only one time or less each week.   4) Eat your meals at a table with the TV or computer off.    Side-effects. Topiramate is generally well tolerated. The main side-effects we see are:   Tingling in hands,feet, or face (usually not very troublesome)   Mental confusion and word finding trouble (about 10% of patients have this.)     Feeling sleepy or a bit dopey- this goes away very soon after starting.    One of the dangers of topiramate is the possibility of birth defects--if you get pregnant when you are on it, there is the risk that your baby will be born with a cleft lip or palate.  If you are on topiramate and of child bearing age, you need to be on a reliable form of birth control or refrain from sexual intercourse.     Please refer to the pharmacy insert for more information on side-effects. Since many pharmacists are not familiar with the use of topiramate in weight loss, calling the clinic will get you the most accurate information on the use of this medication for weight loss.     In order to get refills of this or any medication we prescribe you must be seen in the medical weight mgmt clinic every 2-3 months. Please have your pharmacy fax a refill request to [phone].

## 2019-02-13 NOTE — PROGRESS NOTES
"New Bariatric Nutrition Consultation Note    Reason For Visit: Nutrition Assessment    Aga Alanis is a 39 year old presenting today for new bariatric nutrition consult.  Pt is interested in laparoscopic sleeve gastrectomy with Dr. Roberts expected surgery in August 2019.  Patient is accompanied by self.  This is pt's first of 6 required nutrition visits prior to surgery. Pt referred by RICHARD Dumont (2/13/19).       Support System Reviewed With Patient 2/10/2019   Who do you have in your support network that can be available to help you for the first 2 weeks after surgery? yes   Who can you count on for support throughout your weight loss surgery journey? my , both in laws, aunt, children       ANTHROPOMETRICS:  Estimated body mass index is 37.89 kg/m  as calculated from the following:    Height as of an earlier encounter on 2/13/19: 1.607 m (5' 3.25\").    Weight as of an earlier encounter on 2/13/19: 97.8 kg (215 lb 9.6 oz).     Required weight loss goal pre-op: 205 lbs from initial consult weight (goal weight 10 lbs or less before surgery)       2/10/2019   I have tried the following methods to lose weight Watching portions or calories, Exercise, Weight Watchers, Atkins type diet (low carb/high protein), OTC Medications       Weight Loss Questions Reviewed With Patient 2/10/2019   How long have you been overweight? Since late 20's to early 40's       SUPPLEMENT INFORMATION:  Fish oil, vitamin D 1,000 international unit(s) and probiotic.     Pt is receiving iron infusions.     NUTRITION HISTORY:  Recall Diet Questions Reviewed With Patient 2/10/2019   Describe what you typically consume for breakfast (typical or most recent): coffee with milk 2% or whole    Describe what you typically consume for lunch (typical or most recent): eat out, or skip    Describe what you typically consume for supper (typical or most recent): pizza, chicken, salad, grilled vegetables and meat in the summer  "   Describe what you typically consume as snacks (typical or most recent): Crackers (seeded), fruit, candy   How many ounces of water, or other low calorie drinks, do you drink daily (8 oz=1 glass)? 64 oz or more   How many ounces of caffeine (coffee, tea, pop) do you drink daily (8 oz=1 glass)? 16 oz   How many ounces of milk do you drink daily (8 oz=1 glass) 8 oz   Please indicate the type of milk: 2%   How often do you drink alcohol? Monthly or less   If you do drink alcohol, how many drinks might you have in a day? (one drink = 5 oz. wine, 1 can/bottle of beer, 1 shot liquor) 1 or 2       Eating Habits 2/10/2019   Do you have any dietary restrictions? No   Do you currently binge eat (eat a large amount of food in a short time)? No   Are you an emotional eater? No   Do you get up to eat after falling asleep? No   What foods do you crave? sweet, salty, crunchy       ADDITIONAL INFORMATION:      Dining Out History Reviewed With Patient 2/10/2019   How often do you dine out? Nearly every day.   Where do you dine out? (select all that apply) sit-down restaurants, fast food chains, take out   What types of food do you order when you dine out? depends on what I feel like eating try to pick somewhat healthy options       Physical Activity Reviewed With Patient 2/10/2019   How often do you exercise? Less than 1 time per week   What is the duration of your exercise (in minutes)? 30 Minutes   What types of exercise do you do? walking, home gym   What keeps you from being more active?  I have recently been sick, Too tired       NUTRITION DIAGNOSIS:  Obesity r/t long history of self-monitoring deficit and excessive energy intake aeb BMI >30 kg/m2.    INTERVENTION:  Intervention Provided/Education Provided on post-op diet guidelines, vitamins/minerals essential post-operatively, GI anatomy of bariatric surgeries, ways to help prepare for post-op diet guidelines pre-operatively, portion/calorie-control, mindful eating, sources  "of protein.  Provided pt with list of goals RD contact information.      Questions Reviewed With Patient 2/10/2019   How ready are you to make changes regarding your weight? Number 1 = Not ready at all to make changes up to 10 = very ready. 10   How confident are you that you can change? 1 = Not confident that you will be successful making changes up to 10 = very confident. 10       Patient Understanding: good  Expected Compliance: good    GOALS:  Relating To Eating:  Eat slowly (20-30 minutes per meal), chewing foods well (25 chews per bite/applesauce consistency). Stop as soon as you feel satisfied.   9\" Plate method (1/2 plate non-starchy vegetables/fruit, 1/4 plate lean protein, 1/4 plate whole grain starch - no more than 1/2 cup carb/meal)  Eat 3 meals per day    Relating to beverages:  Reduce caffeine/carbonation/calorie containing beverages  Separate fluids from meals by 30 minutes before, during, and after eating    Relating to dietary supplements:  Start a multivitamin containing iron daily    Relating to activity:  Increase activity as able    Relating to cravings:  Anytime you have a craving, find an activity (such as calling a friend, take a walk, read, check email. ) 15 minutes to see if craving goes away    Follow-Up:   Recommend 1 month  follow up visits to assist with lifestyle changes or per insurance.      Time spent with patient: 30 minutes.  Leila Garces, MS, RD, LD    "

## 2019-02-13 NOTE — PATIENT INSTRUCTIONS
"GOALS:  Relating To Eating:  Eat slowly (20-30 minutes per meal), chewing foods well (25 chews per bite/applesauce consistency). Stop as soon as you feel satisfied.   9\" Plate method (1/2 plate non-starchy vegetables/fruit, 1/4 plate lean protein, 1/4 plate whole grain starch - no more than 1/2 cup carb/meal)  Eat 3 meals per day    Relating to beverages:  Reduce caffeine/carbonation/calorie containing beverages  Separate fluids from meals by 30 minutes before, during, and after eating    Relating to dietary supplements:  Start a multivitamin containing iron daily    Relating to activity:  Increase activity as able    Relating to cravings:  Anytime you have a craving, find an activity (such as calling a friend, take a walk, read, check email. ) 15 minutes to see if craving goes away    Follow up with RD in one month    Leila Garces, MS, RD, LD  If you need to schedule or reschedule with a dietitian please call [phone].  "

## 2019-02-15 ENCOUNTER — HEALTH MAINTENANCE LETTER (OUTPATIENT)
Age: 39
End: 2019-02-15

## 2019-02-20 ENCOUNTER — TELEPHONE (OUTPATIENT)
Dept: SURGERY | Facility: CLINIC | Age: 39
End: 2019-02-20

## 2019-02-20 NOTE — TELEPHONE ENCOUNTER
"I spoke with Aga today. She is having issues with [employer]. They are telling her she \"has\" to have a BMI of 40 or greater. She spoke with her insurance company who states that is not the case. She has a BMI of >35 and a comorbidity. She has spoken with two patient advocates groups who have checked into her policy and they agree with the patient not [employer]RS. At this point, [facility] will not enroll her in the program.   Her  is about to get a new job in approximately a month. The insurance will be [employer]. I told her she would be a candidate as long as the policy covers bariatric surgery, which actually would be the best way for her to go at this point.  I did offer to contact [facility] but she said she not to, that she could not believe the way she was being treated and \"it wasn't worth it.\"  She will call me with ID and group numbers when her  gets the insurance card.   "

## 2019-02-21 ENCOUNTER — MEDICAL CORRESPONDENCE (OUTPATIENT)
Dept: HEALTH INFORMATION MANAGEMENT | Facility: CLINIC | Age: 39
End: 2019-02-21

## 2019-03-12 ENCOUNTER — TELEPHONE (OUTPATIENT)
Dept: SURGERY | Facility: CLINIC | Age: 39
End: 2019-03-12

## 2019-03-12 NOTE — TELEPHONE ENCOUNTER
I spoke with Aga today about the issue with [employer]. She said her  was not offered the job so her insurance will not be changing (she is under his policy). I told her I would call [employer] to discuss why her BMI must be 40 or greater to have surgery.

## 2019-03-12 NOTE — TELEPHONE ENCOUNTER
I contact [facility] to check on what [employer] was saying about a patient's BMI having to be 40 or greater. I was told this was a plan that was chosen by the 's employer and it does need to be 40 or greater. Unfortunately, Aga does not meet that criteria but has 4 comorbid conditions but BMI of 37.   I will call Aga to discuss what she would like to do, possible Weight Management?

## 2019-03-13 ENCOUNTER — OFFICE VISIT (OUTPATIENT)
Dept: SURGERY | Facility: CLINIC | Age: 39
End: 2019-03-13
Payer: COMMERCIAL

## 2019-03-13 NOTE — PATIENT INSTRUCTIONS
"GOALS:  Relating To Eating:  Eat slowly (20-30 minutes per meal), chewing foods well (25 chews per bite/applesauce consistency). Stop as soon as you feel satisfied.   9\" Plate method (1/2 plate non-starchy vegetables/fruit, 1/4 plate lean protein, 1/4 plate whole grain starch - no more than 1/2 cup carb/meal)  **Eat 3 meals per day  - Have before 10:30am     Relating to beverages:  Reduce caffeine/carbonation/calorie containing beverages  **Separate fluids from meals by 30 minutes before, during, and after eating    Relating to dietary supplements:  Start a multivitamin containing iron daily    Relating to activity:  Increase activity as able    Relating to cravings:  Anytime you have a craving, find an activity (such as calling a friend, take a walk, read, check email. ) 15 minutes to see if craving goes away    *Protein Shake Criteria: no more than 250 Calories, at least 20 grams of protein, and less than 10 grams of sugar.     Follow up with RD in one month    Leila Garces, MS, RD, LD  If you need to schedule or reschedule with a dietitian please call [phone].  "

## 2019-03-13 NOTE — PROGRESS NOTES
"Return Bariatric Nutrition Consultation Note    Reason For Visit: Nutrition Assessment    Aga Alanis is a 39 year old presenting today for follow-up bariatric nutrition consult.  Pt is interested in laparoscopic sleeve gastrectomy with Dr. Roberts expected surgery in August 2019.  Patient is accompanied by self.  This is pt's 2nd of 6 required nutrition visits prior to surgery. Pt referred by RICHARD Dumont (2/13/19).       Support System Reviewed With Patient 2/10/2019   Who do you have in your support network that can be available to help you for the first 2 weeks after surgery? yes   Who can you count on for support throughout your weight loss surgery journey? my , both in laws, aunt, children     Care Coordination:  3/13/19: Pt will continue seeing the RD every month for 6 months. She does not qualify for surgery coverage with her current insurance. Pt is working with Jose. She reports that her and her  are both looking for different jobs and would like to consider surgery in the future.     ANTHROPOMETRICS:  Estimated body mass index is 37.89 kg/m  as calculated from the following:    Height as of 2/13/19: 1.607 m (5' 3.25\").    Weight as of 2/13/19: 97.8 kg (215 lb 9.6 oz).   Current weight: 213.9 lb ( -1.7 lb from initial consult)     Required weight loss goal pre-op: 205 lbs from initial consult weight (goal weight 10 lbs or less before surgery)       2/10/2019   I have tried the following methods to lose weight Watching portions or calories, Exercise, Weight Watchers, Atkins type diet (low carb/high protein), OTC Medications       Weight Loss Questions Reviewed With Patient 2/10/2019   How long have you been overweight? Since late 20's to early 40's       SUPPLEMENT INFORMATION:  Fish oil, vitamin D 1,000 international unit(s) and probiotic.     Pt is receiving iron infusions.     NUTRITION HISTORY:  Relating To Eating:  Eat slowly (20-30 minutes per meal), chewing foods " "well (25 chews per bite/applesauce consistency). Stop as soon as you feel satisfied. -Improving, when she thinks about chewing slowly she does well.   9\" Plate method (1/2 plate non-starchy vegetables/fruit, 1/4 plate lean protein, 1/4 plate whole grain starch - no more than 1/2 cup carb/meal)-Met, patient moved to using the smaller plate.   Eat 3 meals per day - Improving /Continues: sometimes she will skip breakfast.     Relating to beverages:  Reduce caffeine/carbonation/calorie containing beverages-Improving, she switched to fairlife milk, high protein and less sugar/carbohydrates than regular cows milk.   Separate fluids from meals by 30 minutes before, during, and after eating-Not met     Relating to dietary supplements:  Start a multivitamin containing iron daily-Met     Relating to activity:  Increase activity as able-Continues     Relating to cravings:  Anytime you have a craving, find an activity (such as calling a friend, take a walk, read, check email. ) 15 minutes to see if craving goes away-Pt states since starting the Topiramate than she is not having cravings.       ADDITIONAL INFORMATION:  Pt prefers to eat organic foods, and she has a daughter that is lactose intolerant.     * Today, she expresses that she is concerned with weight management because she has lot weight in the past but then will gain it back. She understands that she needs to lose weight in a sustainable way and that lifestyle changes need to be made. Pt expressed concern about finding a lifestyle that will work for her, she explains that diabetes and heart disease runs in her family and she does not want that to be her story.    In the past she has lost weight from intermittent fasting from 12-18 fasts.       NUTRITION DIAGNOSIS:  Overweight r/t long history of self-monitoring deficit and excessive energy intake aeb BMI >25 kg/m2.    INTERVENTION:  Intervention Provided/Education: Reviewed post-op diet guidelines, ways to help " "prepare for post-op diet guidelines pre-operatively, portion/calorie-control, mindful eating, sources of protein.  Provided pt with list of goals RD contact information.    - Discussed eating breakfast  - Recommended high protein breakfast. Talked about shakes and protein powders.   - Discussed weight management with continuing to practice her goals for surgery.       Questions Reviewed With Patient 2/10/2019   How ready are you to make changes regarding your weight? Number 1 = Not ready at all to make changes up to 10 = very ready. 10   How confident are you that you can change? 1 = Not confident that you will be successful making changes up to 10 = very confident. 10       Patient Understanding: good  Expected Compliance: good    GOALS:  Relating To Eating:  Eat slowly (20-30 minutes per meal), chewing foods well (25 chews per bite/applesauce consistency). Stop as soon as you feel satisfied.   9\" Plate method (1/2 plate non-starchy vegetables/fruit, 1/4 plate lean protein, 1/4 plate whole grain starch - no more than 1/2 cup carb/meal)  **Eat 3 meals per day  - Have before 10:30am     Relating to beverages:  Reduce caffeine/carbonation/calorie containing beverages  **Separate fluids from meals by 30 minutes before, during, and after eating    Relating to dietary supplements:  Start a multivitamin containing iron daily    Relating to activity:  Increase activity as able    Relating to cravings:  Anytime you have a craving, find an activity (such as calling a friend, take a walk, read, check email. ) 15 minutes to see if craving goes away    *Protein Shake Criteria: no more than 250 Calories, at least 20 grams of protein, and less than 10 grams of sugar.     Follow-Up:   Recommend 1 month  follow up visits to assist with lifestyle changes or per insurance.    Time spent with patient: 30 minutes.  Leila Garces, MS, RD, LD  "

## 2019-03-13 NOTE — LETTER
"3/13/2019       RE: Aga Alanis  [address] [place] [address]     Dear Colleague,    Thank you for referring your patient, Aga Alanis, to the [facility] SURGICAL WEIGHT MANAGEMENT at [facility]. Please see a copy of my visit note below.    Return Bariatric Nutrition Consultation Note    Reason For Visit: Nutrition Assessment    Aga Alanis is a 39 year old presenting today for follow-up bariatric nutrition consult.  Pt is interested in laparoscopic sleeve gastrectomy with Dr. Roberts expected surgery in August 2019.  Patient is accompanied by self.  This is pt's 2nd of 6 required nutrition visits prior to surgery. Pt referred by RICHARD Dumont (2/13/19).       Support System Reviewed With Patient 2/10/2019   Who do you have in your support network that can be available to help you for the first 2 weeks after surgery? yes   Who can you count on for support throughout your weight loss surgery journey? my , both in laws, aunt, children     Care Coordination:  3/13/19: Pt will continue seeing the RD every month for 6 months. She does not qualify for surgery coverage with her current insurance. Pt is working with Jose. She reports that her and her  are both looking for different jobs and would like to consider surgery in the future.     ANTHROPOMETRICS:  Estimated body mass index is 37.89 kg/m  as calculated from the following:    Height as of 2/13/19: 1.607 m (5' 3.25\").    Weight as of 2/13/19: 97.8 kg (215 lb 9.6 oz).   Current weight: 213.9 lb ( -1.7 lb from initial consult)     Required weight loss goal pre-op: 205 lbs from initial consult weight (goal weight 10 lbs or less before surgery)       2/10/2019   I have tried the following methods to lose weight Watching portions or calories, Exercise, Weight Watchers, Atkins type diet (low carb/high protein), OTC Medications       Weight Loss Questions Reviewed " "With Patient 2/10/2019   How long have you been overweight? Since late 20's to early 40's       SUPPLEMENT INFORMATION:  Fish oil, vitamin D 1,000 international unit(s) and probiotic.     Pt is receiving iron infusions.     NUTRITION HISTORY:  Relating To Eating:  Eat slowly (20-30 minutes per meal), chewing foods well (25 chews per bite/applesauce consistency). Stop as soon as you feel satisfied. -Improving, when she thinks about chewing slowly she does well.   9\" Plate method (1/2 plate non-starchy vegetables/fruit, 1/4 plate lean protein, 1/4 plate whole grain starch - no more than 1/2 cup carb/meal)-Met, patient moved to using the smaller plate.   Eat 3 meals per day - Improving /Continues: sometimes she will skip breakfast.     Relating to beverages:  Reduce caffeine/carbonation/calorie containing beverages-Improving, she switched to fairlife milk, high protein and less sugar/carbohydrates than regular cows milk.   Separate fluids from meals by 30 minutes before, during, and after eating-Not met     Relating to dietary supplements:  Start a multivitamin containing iron daily-Met     Relating to activity:  Increase activity as able-Continues     Relating to cravings:  Anytime you have a craving, find an activity (such as calling a friend, take a walk, read, check email. ) 15 minutes to see if craving goes away-Pt states since starting the Topiramate than she is not having cravings.       ADDITIONAL INFORMATION:  Pt prefers to eat organic foods, and she has a daughter that is lactose intolerant.     * Today, she expresses that she is concerned with weight management because she has lot weight in the past but then will gain it back. She understands that she needs to lose weight in a sustainable way and that lifestyle changes need to be made. Pt expressed concern about finding a lifestyle that will work for her, she explains that diabetes and heart disease runs in her family and she does not want that to be her " "story.    In the past she has lost weight from intermittent fasting from 12-18 fasts.       NUTRITION DIAGNOSIS:  Overweight r/t long history of self-monitoring deficit and excessive energy intake aeb BMI >25 kg/m2.    INTERVENTION:  Intervention Provided/Education: Reviewed post-op diet guidelines, ways to help prepare for post-op diet guidelines pre-operatively, portion/calorie-control, mindful eating, sources of protein.  Provided pt with list of goals RD contact information.    - Discussed eating breakfast  - Recommended high protein breakfast. Talked about shakes and protein powders.   - Discussed weight management with continuing to practice her goals for surgery.       Questions Reviewed With Patient 2/10/2019   How ready are you to make changes regarding your weight? Number 1 = Not ready at all to make changes up to 10 = very ready. 10   How confident are you that you can change? 1 = Not confident that you will be successful making changes up to 10 = very confident. 10       Patient Understanding: good  Expected Compliance: good    GOALS:  Relating To Eating:  Eat slowly (20-30 minutes per meal), chewing foods well (25 chews per bite/applesauce consistency). Stop as soon as you feel satisfied.   9\" Plate method (1/2 plate non-starchy vegetables/fruit, 1/4 plate lean protein, 1/4 plate whole grain starch - no more than 1/2 cup carb/meal)  **Eat 3 meals per day  - Have before 10:30am     Relating to beverages:  Reduce caffeine/carbonation/calorie containing beverages  **Separate fluids from meals by 30 minutes before, during, and after eating    Relating to dietary supplements:  Start a multivitamin containing iron daily    Relating to activity:  Increase activity as able    Relating to cravings:  Anytime you have a craving, find an activity (such as calling a friend, take a walk, read, check email. ) 15 minutes to see if craving goes away    *Protein Shake Criteria: no more than 250 Calories, at least 20 " grams of protein, and less than 10 grams of sugar.     Follow-Up:   Recommend 1 month  follow up visits to assist with lifestyle changes or per insurance.    Time spent with patient: 30 minutes.    Again, thank you for allowing me to participate in the care of your patient.      Sincerely,    Leila Garces RD

## 2019-03-14 ENCOUNTER — TELEPHONE (OUTPATIENT)
Dept: SURGERY | Facility: CLINIC | Age: 39
End: 2019-03-14

## 2019-03-14 NOTE — TELEPHONE ENCOUNTER
"Aga called to find out if we had a \"form\" letter that she could submit to her HR department to see if they would help her get bariatric surgery. [employer] states patients need a BMI of 40, she is under 40.   I did ask her to discuss with her  the fact that his BMI is not 40 either and that will be an issue for him as well. I will look into the cost of surgery versus uncontrolled comorbidities they have.  "

## 2019-03-15 ENCOUNTER — TELEPHONE (OUTPATIENT)
Dept: SURGERY | Facility: CLINIC | Age: 39
End: 2019-03-15

## 2019-03-15 NOTE — TELEPHONE ENCOUNTER
I left Aga a message regarding getting costs to submit to her HR Dept. This cannot be done. However, Trinh Davidson NP does recommend writing a letter to the HR Department to make her case for [facility] to allow her and her  the authorization to work with [employer] to drop the 40 BMI that they request.

## 2019-03-18 DIAGNOSIS — E66.812 OBESITY, CLASS II, BMI 35-39.9: ICD-10-CM

## 2019-03-18 RX ORDER — TOPIRAMATE 25 MG/1
TABLET, FILM COATED ORAL
Qty: 90 TABLET | Refills: 3 | Status: SHIPPED | OUTPATIENT
Start: 2019-03-18 | End: 2021-01-20

## 2019-11-07 ENCOUNTER — HEALTH MAINTENANCE LETTER (OUTPATIENT)
Age: 39
End: 2019-11-07

## 2020-02-23 ENCOUNTER — HEALTH MAINTENANCE LETTER (OUTPATIENT)
Age: 40
End: 2020-02-23

## 2020-02-25 ENCOUNTER — HOSPITAL ENCOUNTER (OUTPATIENT)
Dept: ULTRASOUND IMAGING | Facility: CLINIC | Age: 40
End: 2020-02-25
Attending: PHYSICIAN ASSISTANT
Payer: COMMERCIAL

## 2020-02-25 ENCOUNTER — HOSPITAL ENCOUNTER (OUTPATIENT)
Dept: MAMMOGRAPHY | Facility: CLINIC | Age: 40
Discharge: HOME | End: 2020-02-25
Attending: PHYSICIAN ASSISTANT | Admitting: PHYSICIAN ASSISTANT
Payer: COMMERCIAL

## 2020-02-25 DIAGNOSIS — N63.0 BREAST LUMP: ICD-10-CM

## 2020-02-25 PROCEDURE — 76642 ULTRASOUND BREAST LIMITED: CPT | Mod: 50

## 2020-02-25 PROCEDURE — G0279 TOMOSYNTHESIS, MAMMO: HCPCS

## 2020-10-21 ENCOUNTER — TELEPHONE (OUTPATIENT)
Dept: ENDOCRINOLOGY | Facility: CLINIC | Age: 40
End: 2020-10-21

## 2020-10-21 NOTE — TELEPHONE ENCOUNTER
Called patient after no response to link sent on [employer]. Left voicemail, for patient.    Called second time and spoke with patient will reschedule this appointment for Nov 19th.     Leila Garces, MS, RD, LD

## 2020-10-21 NOTE — PROGRESS NOTES
"Aga Alanis is a 40 year old female who is being evaluated via a billable video visit.      The patient has been notified of following:     \"This video visit will be conducted via a call between you and your physician/provider. We have found that certain health care needs can be provided without the need for an in-person physical exam.  This service lets us provide the care you need with a video conversation.  If a prescription is necessary we can send it directly to your pharmacy.  If lab work is needed we can place an order for that and you can then stop by our lab to have the test done at a later time.    Video visits are billed at different rates depending on your insurance coverage.  Please reach out to your insurance provider with any questions.    If during the course of the call the physician/provider feels a video visit is not appropriate, you will not be charged for this service.\"    Patient has given verbal consent for Video visit? Yes  How would you like to obtain your AVS? MyChart  If you are dropped from the video visit, the video invite should be resent to: Text to cell phone: [phone]  Will anyone else be joining your video visit? No        Video-Visit Details    Type of service:  Video Visit    Video Start Time: 11:09 AM   Video End Time: 11:37 AM    Originating Location (pt. Location): Home    Distant Location (provider location):  [facility] [place]     Platform used for Video Visit: [employer]    During this virtual visit the patient is located in [place], patient verifies this as the location during the entirety of this visit.       Return Bariatric Nutrition Consultation Note    Reason For Visit: Nutrition Assessment    Aga Alanis is a 39 year old presenting today for follow-up bariatric nutrition consult.  Pt is interested in laparoscopic sleeve gastrectomy with Dr. Roberts expected surgery in Febuary 2021.  Patient is accompanied by " "self.  This is pt's 1 of 3 required nutrition visits prior to surgery. Pt referred by RICHARD Dumont (2/13/19).   - pt stated the surgery process in 2019. Since then she has changed insurance and now need 3 RD visits instead of 6.       Support System Reviewed With Patient 2/10/2019   Who do you have in your support network that can be available to help you for the first 2 weeks after surgery? yes   Who can you count on for support throughout your weight loss surgery journey? my , both in laws, aunt, children       ANTHROPOMETRICS:  Estimated body mass index is 37.89 kg/m  as calculated from the following:    Height as of 2/13/19: 1.607 m (5' 3.25\").    Weight as of 2/13/19: 97.8 kg (215 lb 9.6 oz).   Current weight: 181 lb ( per pt's report)     Required weight loss goal pre-op: 205 lbs from initial consult weight (goal weight 10 lbs or less before surgery)       2/10/2019   I have tried the following methods to lose weight Watching portions or calories, Exercise, Weight Watchers, Atkins type diet (low carb/high protein), OTC Medications       Weight Loss Questions Reviewed With Patient 2/10/2019   How long have you been overweight? Since late 20's to early 40's       SUPPLEMENT INFORMATION:  Did not review today    NUTRITION HISTORY:   She has continued to work on weight loss and the goals for bariatric surgery over the past few months: she gave up drinking straws, has been drinking more than 64 ounces of water. She does not eat many process food and follows a gluten free diet.      Snack in same pattern: GF preztel thinks and PB or Nutella Or banana or apple.  Not a big sweet person most of the time      B: Skips OR eggs or starbucks egg bites OR Just crack and egg   Lunch: resturant  D: [place] a lot of dinner: pelt grill chicke or pork OR GF pizza or CF chicken nuggests     Progress Towards Previous Goals:  Relating To Eating:  Eat slowly (20-30 minutes per meal), chewing foods well (25 chews per " "bite/applesauce consistency). Stop as soon as you feel satisfied. -MEt  9\" Plate method (1/2 plate non-starchy vegetables/fruit, 1/4 plate lean protein, 1/4 plate whole grain starch - no more than 1/2 cup carb/meal)-Met  **Eat 3 meals per day-Improving   - Have before 10:30am     Relating to beverages:  Reduce caffeine/carbonation/calorie containing beverages-Met  **Separate fluids from meals by 30 minutes before, during, and after eating-Met    Relating to dietary supplements:  Start a multivitamin containing iron daily-Did not review today     Relating to activity:  Increase activity as able-Met    Relating to cravings:  Anytime you have a craving, find an activity (such as calling a friend, take a walk, read, check email. ) 15 minutes to see if craving goes away-Met    ADDITIONAL INFORMATION:  Pt prefers to eat organic foods, and she has a daughter that is lactose intolerant.     In the past she has lost weight from intermittent fasting from 12-18 fasts.       NUTRITION DIAGNOSIS:  Overweight r/t long history of self-monitoring deficit and excessive energy intake aeb BMI >25 kg/m2.-improving     INTERVENTION:  Intervention Provided/Education:  1. Reviewed and modified goals  2. Reviewed post-op diet guidelines, ways to help prepare for post-op diet guidelines pre-operatively, portion/calorie-control, mindful eating, sources of protein.   3. Praised patient for diet and lifestyle changes and weight loss.   4. Dicussed gluten free food options.   5. AVS via [employer]      Questions Reviewed With Patient 2/10/2019   How ready are you to make changes regarding your weight? Number 1 = Not ready at all to make changes up to 10 = very ready. 10   How confident are you that you can change? 1 = Not confident that you will be successful making changes up to 10 = very confident. 10       Patient Understanding: good  Expected Compliance: good    GOALS:  Relating To Eating:  Eat slowly (20-30 minutes per meal), chewing foods " "well (25 chews per bite/applesauce consistency). Stop as soon as you feel satisfied.   9\" Plate method (1/2 plate non-starchy vegetables/fruit, 1/4 plate lean protein, 1/4 plate whole grain starch - no more than 1/2 cup carb/meal)  Eat 3 meals per day      Relating to beverages:  Reduce caffeine/carbonation/calorie containing beverages  Separate fluids from meals by 30 minutes before, during, and after eating  Drinking a small sip of water every 15 minutes.     Relating to dietary supplements:  Start a multivitamin containing iron daily    Relating to activity:  Increase activity as able      *Protein Shake Criteria: no more than 250 Calories, at least 20 grams of protein, and less than 10 grams of sugar.   - Try Genpro unflavored for a protein powder it is wheat free. For a protein shake try 1/2 cup of fruit, almond milk and a scoop of protein powder.     Live-G free brand at [facility]'s       Follow-Up:   Recommend 1 month  follow up visits to assist with lifestyle changes or per insurance.    Time spent with patient: 28 minutes.  Leila Garces, MS, RD, LD  "

## 2020-10-23 NOTE — TELEPHONE ENCOUNTER
REFERRAL INFORMATION:    Referring Provider:  N/A    Referring Clinic:  N/A    Reason for Visit/Diagnosis: New Meet & Greet       FUTURE VISIT INFORMATION:    Appointment Date: 11/19/2020    Appointment Time: 10:30 AM      NOTES RECORD STATUS  DETAILS   OFFICE NOTE from Referring Provider N/A    OFFICE NOTE from Other Specialists Care Everywhere [place]:  - 6/26/19 Office visit with Dr. Krystal Juan   - 4/19/19 Office visit with Dr. Jose Dubose   - 4/12/19 Office visit with Dr. Sohail Salinas     2/13/19 Office visit with Zuri Dodd PA-C ([facility])    HOSPITAL DISCHARGE SUMMARY/ ED VISITS  N/A    OPERATIVE REPORT N/A    ENDOSCOPY (EGD)  N/A    PERTINENT LABS Care Everywhere/ Internal     PATHOLOGY REPORTS (RELATED) N/A    IMAGING (CT, MRI, US, XR)  N/A

## 2020-10-27 ENCOUNTER — TELEPHONE (OUTPATIENT)
Dept: ENDOCRINOLOGY | Facility: CLINIC | Age: 40
End: 2020-10-27

## 2020-10-27 NOTE — TELEPHONE ENCOUNTER
I called patient's insurance company [phone]) to discuss criteria for bariatric surgery.   Age 21 or older  5 year weight history  BMI 35-39.9 with comorbidity  ALEX  Psychological evaluation  Covers once per lifetime unless complications arise    Spoke with patient and relayed this information  to her.

## 2020-11-19 ENCOUNTER — PRE VISIT (OUTPATIENT)
Dept: SURGERY | Facility: CLINIC | Age: 40
End: 2020-11-19

## 2020-11-19 ENCOUNTER — TELEPHONE (OUTPATIENT)
Dept: ENDOCRINOLOGY | Facility: CLINIC | Age: 40
End: 2020-11-19

## 2020-11-19 ENCOUNTER — VIRTUAL VISIT (OUTPATIENT)
Dept: ENDOCRINOLOGY | Facility: CLINIC | Age: 40
End: 2020-11-19
Payer: COMMERCIAL

## 2020-11-19 ENCOUNTER — CARE COORDINATION (OUTPATIENT)
Dept: ENDOCRINOLOGY | Facility: CLINIC | Age: 40
End: 2020-11-19

## 2020-11-19 VITALS — HEIGHT: 64 IN | BODY MASS INDEX: 30.9 KG/M2 | WEIGHT: 181 LBS

## 2020-11-19 DIAGNOSIS — E66.812 OBESITY, CLASS II, BMI 35-39.9: Primary | ICD-10-CM

## 2020-11-19 DIAGNOSIS — E66.812 OBESITY, CLASS II, BMI 35-39.9: ICD-10-CM

## 2020-11-19 DIAGNOSIS — Z71.3 NUTRITIONAL COUNSELING: ICD-10-CM

## 2020-11-19 DIAGNOSIS — E66.3 OVERWEIGHT: Primary | ICD-10-CM

## 2020-11-19 PROCEDURE — 99213 OFFICE O/P EST LOW 20 MIN: CPT | Mod: 95 | Performed by: SURGERY

## 2020-11-19 PROCEDURE — 97803 MED NUTRITION INDIV SUBSEQ: CPT | Mod: 95 | Performed by: DIETITIAN, REGISTERED

## 2020-11-19 RX ORDER — PHENTERMINE HYDROCHLORIDE 8 MG/1
8 TABLET ORAL DAILY
COMMUNITY
Start: 2020-06-29 | End: 2021-01-25

## 2020-11-19 RX ORDER — METFORMIN HCL 500 MG
TABLET, EXTENDED RELEASE 24 HR ORAL
Status: ON HOLD | COMMUNITY
Start: 2020-11-05 | End: 2021-04-27

## 2020-11-19 RX ORDER — FLUCONAZOLE 150 MG/1
TABLET ORAL
COMMUNITY
Start: 2020-11-10 | End: 2021-01-20

## 2020-11-19 RX ORDER — VILAZODONE HYDROCHLORIDE 40 MG/1
40 TABLET ORAL EVERY MORNING
COMMUNITY
Start: 2020-11-04 | End: 2024-05-31

## 2020-11-19 ASSESSMENT — MIFFLIN-ST. JEOR: SCORE: 1466.01

## 2020-11-19 ASSESSMENT — PAIN SCALES - GENERAL: PAINLEVEL: NO PAIN (0)

## 2020-11-19 NOTE — PROGRESS NOTES
"Tasklist updated and sent to patient via [employer].    Bariatric Task List  Status:  Is patient a candidate for bariatric surgery?:  patient is a candidate for bariatric surgery -     Cleared to schedule surgeon consult?:  cleared to schedule surgeon consult - 11/19/20 visit. bks   Status:  surgery evaluation in process -     Surgeon: Dr Dee -     Tentative surgery month/year: Feb 2021 -        Insurance: Insurance:  [facility], on 1/1/2021 change to [employer]. Call [phone] with updated insurance info when you get it. bks -        Patient Info: Initial Weight:  215 -     Date of Initial Weight/Height:  2/13/2019 -     Goal Weight (lbs):  205 -     Required Weight Loss:  10 -     Surgery Type:  sleeve gastrectomy - with hiatal hernia repair      Dietician Visits: Structured weight loss required by insurance?:  structured weight loss required - # RD visits if exercise program also discussed. bks   Dietician Visit 1:  Completed - 11/19/20 bks   Dietician Visit 2:  Needed -     Dietician Visit 3:  Needed -     Dietician Visit 4:    -     Dietician Visit 5:    -     Dietician Visit 6:    -     Dietician Visit additional:    -  If needed for postop diet teaching. Call [phone] to schedule. bks      Psychological Evaluation: Psych eval:  Needed -     Therapist letter of support:  Needed - Psychologist.   Psychiatrist letter of support:  Needed -        Lab Work: Complete Blood Count:  Needed -     Comprehensive Metabolic Panel:  Needed -     Vitamin D:  Needed -     Hgb A1c:  Needed -     PTH:  Needed -     Other:  Needed - lipids      Consults/ Clearance: Sleep Medicine:  Needed -   Hematology:  Completed - 2/21/2019 HEM Clearance from Dr Abhijit castellanos to use per HUSSEIN Dumont      Testing: EGD:  Completed -     Other:    -        PCP: Establish care with PCP:  Completed -     PCP letter of support:  Needed -        Patient Education:  Information Session:  Completed -     Given \"Making your decision\" " "handout?:  Given \"\"Making your decision\"\" handout? -     Given support group information?:  support group contact information provided -     Attended support group?:    -     Support plan in place?:  Completed -     Research consents signed?:  research consent signed -        Final Tasks:  Before surgery online class:  Needed -     Before surgery online class website link:  [email or web address]   After surgery online class:  Needed -     After surgery online class website link:  [email or web address]   Nurse visit for weigh-in and information:  Needed -     Pre-assessment clinic visit with anesthesia team for H&P:  Needed -     Final labs (Hgb, plt, T&S, UA):  Needed -        Notes: Please register for the Get Well Loop when you get an email invitation and a surgery date.     The Get Well Loop will give you information via email or text messages that can help you be more successful before and after surgery.  It can also help answer any questions you may have.   -           "

## 2020-11-19 NOTE — PROGRESS NOTES
"Aga Alanis is a 40 year old female who is being evaluated via a billable video visit.      The patient has been notified of following:     \"This video visit will be conducted via a call between you and your physician/provider. We have found that certain health care needs can be provided without the need for an in-person physical exam.  This service lets us provide the care you need with a video conversation.  If a prescription is necessary we can send it directly to your pharmacy.  If lab work is needed we can place an order for that and you can then stop by our lab to have the test done at a later time.    Video visits are billed at different rates depending on your insurance coverage.  Please reach out to your insurance provider with any questions.    If during the course of the call the physician/provider feels a video visit is not appropriate, you will not be charged for this service.\"    Patient has given verbal consent for Video visit? Yes  How would you like to obtain your AVS? MyChart  If you are dropped from the video visit, the video invite should be resent to: Text to cell phone: [phone]  Will anyone else be joining your video visit? No    During this virtual visit the patient is located in [place], patient verifies this as the location during the entirety of this visit.     Video-Visit Details    Type of service:  Video Visit    Video Start Time: 10:38 AM  Video End Time: 1102 AM    Originating Location (pt. Location): [place]    Distant Location (provider location):  [facility] [place]     Platform used for Video Visit: [employer]    Davi Dee MD      Dear Dr. Abbott,    Referring provider: No flowsheet data found.  I was asked to see the patient regarding obesity by the referring provider above.    I had the pleasure of meeting with your patient Aga Alanis in our weight loss surgery office.  This patient is a 40 year old female who has " "been undergoing our thorough preoperative screening process in anticipation of potential bariatric surgery.    At initial evaluation we recorded Aga Alanis's Height: 162.6 cm (5' 4\"), Initial Weight (lbs): 215 lbs and Initial BMI: 37.97.  The patient has been unsuccessful with other methods of permanent weight loss and suffers from multiple weight related medical conditions.  Due to lack of success in achieving weight loss through other methods, she is interested in undergoing bariatric surgery.    PREVIOUS WEIGHT LOSS ATTEMPTS:     2/10/2019   I have tried the following methods to lose weight Watching portions or calories, Exercise, Weight Watchers, Atkins type diet (low carb/high protein), OTC Medications       CO-MORBIDITIES OF OBESITY INCLUDE:     2/10/2019   I have the following health issues associated with obesity: Sleep Apnea, Polycystic Ovarian Syndrome, Weight Bearing Joint Pain       VITALS:  Ht 1.626 m (5' 4\")   Wt 82.1 kg (181 lb)   LMP 06/05/2017   BMI 31.07 kg/m      PE:  GENERAL: Alert and oriented x3. NAD  RESPIRATORY: Breathing unlabored  Rest of exam deferred due to virtual visit.    In summary, she has undergone an appropriate medical evaluation, dietitian evaluation, as well as psychologic screening. The patient appears to be an appropriate candidate for bariatric surgery.    In the office today, I discussed the laparoscopic gastric sleeve surgery.  Risks, benefits and anticipated outcomes were outlined including the risk of death, staple line leak (1-2%), PE, DVT, ulcer, worsening GERD, N/V, stricture, hernia, wound infection, weight regain, and vitamin deficiencies. This patient has a good chance of sustaining permanent weight loss due to this procedure.  This should also allow improvement if not resolution of his/her weight related medical conditions.    At present we are going to present your patient's file for prior authorization to insurance.  Pending prior authorization, I " anticipate a surgical date in the near future.  We will keep you updated on any progress.  If you have questions regarding care please feel free to contact me.  Total time spent in the clinic was 24 minutes with greater than 50% in face-to-face consultation.        Sincerely,    Davi Dee MD    Please route or send letter to:  Primary Care Provider (PCP) and Referring Provider        Nikhil Campbell MD - 08/30/2018  8:37 AM CDT  Formatting of this note may be different from the original.  [facility] [address], [address], [place] [address]     Patient Name: Aga Alanis  Gender:  Female  Exam Date: 08/30/2018 Visit Number:  [id number]  Age: 38 Years YOB: 1980  Attending MD: Nikhil Campbell MD Medical Record#:  [id number]  -----------------------------------------------------------------------------------------------------------------------------   Procedure:    Upper GI Endoscopy   Indications:    Heartburn  Provider:        Nikhil Campbell MD   Referring MD: Michael Abbott MD   Primary MD:      Michael Abbott MD  Medications:   Admitting Medication:   0.9% Normal Saline at O   Intra Procedure Medications:   Patient received monitored anesthesia care.    Complications: No immediate complications  ___________________________________________________________________________________________  Procedure:   An examination of the heart and lungs was performed within acceptable limits. The patient was therefore deemed a reasonable candidate for sedation.   The risks and benefits were explained to the patient, who appeared to understand. After obtaining informed consent, the scope was passed under direct vision. Throughout the procedure the patient's blood pressure, pulse and oxygen saturations were monitored.  The scope was introduced through the mouth and advanced to the third portion of duodenum.         Findings:   Esophagus:  Normal esophagus.   The z-line is 32  centimeters from the incisors.  Top of the gastric folds is 32 centimeters from the incisors.  Stomach:  H. Pylori biopsies taken.   The diaphragm hiatus is at 35 centimeters from the incisors.   Medium Hiatal Hernia.   Stomach erosion. Size - 3 mm. Number - single. Location - antrum. Biopsy taken of erosion(s). Maneuver - cold biopsy forceps.  Duodenum:  Normal duodenum.  Impression:   GERD without esophagitis  Hiatal hernia  Gastric erosion, unspecified chronicity  MD Impression Comments:  avoid NSAIDS and ASA. Continue omeprazole for now. If still problems make an appt. in the esophagus clinic     Pathology Results:  A: STOMACH, ANTRUM,  BIOPSY:  1. Reactive gastropathy with mucosal erosion (see comment)  2. No chronic gastritis is present  3. No Helicobacter organisms identified  B: STOMACH, BIOPSY:  1. Gastric antral and body mucosae with no diagnostic abnormalities  2. No evidence of Helicobacter organisms    MICROSCOPIC  A:  The gastric sampling includes antral mucosa only. The antral mucosa is involved by changes of reactive gastropathy. These changes include mild foveolar hyperplasia, mild epithelial reactive changes and mild fibrosis with negligible inflammation. Mucosal erosion is noted in the endoscopy findings. There is no evidence of atrophic gastritis and no Helicobacter organisms are identified.  B:  Performed  Electronically signed by: Neel Temple MD    Orders    Instruction(s)/Education:  Instruction/Education Timeframe Assessment   Hiatal Hernia  K44.9         _Electronically signed by:___________________  Roge Lawton MD                 08/30/2018    cc: Michael Abbott MD  cc: Michael Abbott MD

## 2020-11-19 NOTE — TELEPHONE ENCOUNTER
Patient called to discuss her insurance is changing [facility] 1[facility]. Went over the medical policy criteria with her.   She inquired about a psychological evaluation. I discussed Dr. Zhang. Patient states she sees a psychiatrist and a licensed psychologist and was wondering if the psychologist could do the psych eval. I told her I would ask Nidia Cole to send her the psych eval letter to give to her psychologist to see if she would do it. Just in case, she asked that I schedule an appointment with Dr. Zhang on 1/18/21 at 8:30 a.m. via video. I did ask her to call me back if she does not need Dr. Zhang's appointment which she said she would.

## 2020-11-19 NOTE — LETTER
"11/19/2020       RE: Aga Alanis  [address] [facility] [place] [address]     Dear Colleague,    Thank you for referring your patient, Aga Alanis, to the [facility] [place] at [facility]. Please see a copy of my visit note below.    Aga Alanis is a 40 year old female who is being evaluated via a billable video visit.      The patient has been notified of following:     \"This video visit will be conducted via a call between you and your physician/provider. We have found that certain health care needs can be provided without the need for an in-person physical exam.  This service lets us provide the care you need with a video conversation.  If a prescription is necessary we can send it directly to your pharmacy.  If lab work is needed we can place an order for that and you can then stop by our lab to have the test done at a later time.    Video visits are billed at different rates depending on your insurance coverage.  Please reach out to your insurance provider with any questions.    If during the course of the call the physician/provider feels a video visit is not appropriate, you will not be charged for this service.\"    Patient has given verbal consent for Video visit? Yes  How would you like to obtain your AVS? MyChart  If you are dropped from the video visit, the video invite should be resent to: Text to cell phone: [phone]  Will anyone else be joining your video visit? No        Video-Visit Details    Type of service:  Video Visit    Video Start Time: 11:09 AM   Video End Time: 11:37 AM    Originating Location (pt. Location): Home    Distant Location (provider location):  [facility]     Platform used for Video Visit: [employer]    During this virtual visit the patient is located in [place], patient verifies this as the location during the entirety of this " "visit.       Return Bariatric Nutrition Consultation Note    Reason For Visit: Nutrition Assessment    Aga Alanis is a 39 year old presenting today for follow-up bariatric nutrition consult.  Pt is interested in laparoscopic sleeve gastrectomy with Dr. Roberts expected surgery in Febuary 2021.  Patient is accompanied by self.  This is pt's 1 of 3 required nutrition visits prior to surgery. Pt referred by RICHARD Dumont (2/13/19).   - pt stated the surgery process in 2019. Since then she has changed insurance and now need 3 RD visits instead of 6.       Support System Reviewed With Patient 2/10/2019   Who do you have in your support network that can be available to help you for the first 2 weeks after surgery? yes   Who can you count on for support throughout your weight loss surgery journey? my , both in laws, aunt, children       ANTHROPOMETRICS:  Estimated body mass index is 37.89 kg/m  as calculated from the following:    Height as of 2/13/19: 1.607 m (5' 3.25\").    Weight as of 2/13/19: 97.8 kg (215 lb 9.6 oz).   Current weight: 181 lb ( per pt's report)     Required weight loss goal pre-op: 205 lbs from initial consult weight (goal weight 10 lbs or less before surgery)       2/10/2019   I have tried the following methods to lose weight Watching portions or calories, Exercise, Weight Watchers, Atkins type diet (low carb/high protein), OTC Medications       Weight Loss Questions Reviewed With Patient 2/10/2019   How long have you been overweight? Since late 20's to early 40's       SUPPLEMENT INFORMATION:  Did not review today    NUTRITION HISTORY:   She has continued to work on weight loss and the goals for bariatric surgery over the past few months: she gave up drinking straws, has been drinking more than 64 ounces of water. She does not eat many process food and follows a gluten free diet.      Snack in same pattern: GF preztel thinks and PB or Nutella Or banana or apple.  Not a big " "sweet person most of the time      B: Skips OR eggs or starbucks egg bites OR Just crack and egg   Lunch: resturant  D: [place] a lot of dinner: pelt grill chicke or pork OR GF pizza or CF chicken nuggests     Progress Towards Previous Goals:  Relating To Eating:  Eat slowly (20-30 minutes per meal), chewing foods well (25 chews per bite/applesauce consistency). Stop as soon as you feel satisfied. -MEt  9\" Plate method (1/2 plate non-starchy vegetables/fruit, 1/4 plate lean protein, 1/4 plate whole grain starch - no more than 1/2 cup carb/meal)-Met  **Eat 3 meals per day-Improving   - Have before 10:30am     Relating to beverages:  Reduce caffeine/carbonation/calorie containing beverages-Met  **Separate fluids from meals by 30 minutes before, during, and after eating-Met    Relating to dietary supplements:  Start a multivitamin containing iron daily-Did not review today     Relating to activity:  Increase activity as able-Met    Relating to cravings:  Anytime you have a craving, find an activity (such as calling a friend, take a walk, read, check email. ) 15 minutes to see if craving goes away-Met    ADDITIONAL INFORMATION:  Pt prefers to eat organic foods, and she has a daughter that is lactose intolerant.     In the past she has lost weight from intermittent fasting from 12-18 fasts.       NUTRITION DIAGNOSIS:  Overweight r/t long history of self-monitoring deficit and excessive energy intake aeb BMI >25 kg/m2.-improving     INTERVENTION:  Intervention Provided/Education:  1. Reviewed and modified goals  2. Reviewed post-op diet guidelines, ways to help prepare for post-op diet guidelines pre-operatively, portion/calorie-control, mindful eating, sources of protein.   3. Praised patient for diet and lifestyle changes and weight loss.   4. Dicussed gluten free food options.   5. AVS via [employer]      Questions Reviewed With Patient 2/10/2019   How ready are you to make changes regarding your weight? Number 1 = Not " "ready at all to make changes up to 10 = very ready. 10   How confident are you that you can change? 1 = Not confident that you will be successful making changes up to 10 = very confident. 10       Patient Understanding: good  Expected Compliance: good    GOALS:  Relating To Eating:  Eat slowly (20-30 minutes per meal), chewing foods well (25 chews per bite/applesauce consistency). Stop as soon as you feel satisfied.   9\" Plate method (1/2 plate non-starchy vegetables/fruit, 1/4 plate lean protein, 1/4 plate whole grain starch - no more than 1/2 cup carb/meal)  Eat 3 meals per day      Relating to beverages:  Reduce caffeine/carbonation/calorie containing beverages  Separate fluids from meals by 30 minutes before, during, and after eating  Drinking a small sip of water every 15 minutes.     Relating to dietary supplements:  Start a multivitamin containing iron daily    Relating to activity:  Increase activity as able    *Protein Shake Criteria: no more than 250 Calories, at least 20 grams of protein, and less than 10 grams of sugar.   - Try Genpro unflavored for a protein powder it is wheat free. For a protein shake try 1/2 cup of fruit, almond milk and a scoop of protein powder.     Live-G free brand at [facility]'s     Follow-Up:   Recommend 1 month  follow up visits to assist with lifestyle changes or per insurance.    Time spent with patient: 28 minutes.  Leila Garces, MS, RD, LD    "

## 2020-11-19 NOTE — NURSING NOTE
".  Chief Complaint   Patient presents with     Consult     meet and greet.       Vitals:    11/19/20 1006   Weight: 82.1 kg (181 lb)   Height: 1.626 m (5' 4\")       Body mass index is 31.07 kg/m .                            GODWIN TYSON, EMT    "

## 2020-11-19 NOTE — LETTER
"11/19/2020       RE: Aga Alanis  [address] [place] [address]     Dear Colleague,    Thank you for referring your patient, Aga Alanis, to the [facility] [place] at [facility]. Please see a copy of my visit note below.    Aga Alanis is a 40 year old female who is being evaluated via a billable video visit.      The patient has been notified of following:     \"This video visit will be conducted via a call between you and your physician/provider. We have found that certain health care needs can be provided without the need for an in-person physical exam.  This service lets us provide the care you need with a video conversation.  If a prescription is necessary we can send it directly to your pharmacy.  If lab work is needed we can place an order for that and you can then stop by our lab to have the test done at a later time.    Video visits are billed at different rates depending on your insurance coverage.  Please reach out to your insurance provider with any questions.    If during the course of the call the physician/provider feels a video visit is not appropriate, you will not be charged for this service.\"    Patient has given verbal consent for Video visit? Yes  How would you like to obtain your AVS? MyChart  If you are dropped from the video visit, the video invite should be resent to: Text to cell phone: [phone]  Will anyone else be joining your video visit? No    During this virtual visit the patient is located in [place], patient verifies this as the location during the entirety of this visit.     Video-Visit Details    Type of service:  Video Visit    Video Start Time: 10:38 AM  Video End Time: 1102 AM    Originating Location (pt. Location): Home    Distant Location (provider location):  [facility]     Platform used for Video Visit: " "Julio Cesar Dee MD      Dear Dr. Abbott,    Referring provider: No flowsheet data found.  I was asked to see the patient regarding obesity by the referring provider above.    I had the pleasure of meeting with your patient Aga Alanis in our weight loss surgery office.  This patient is a 40 year old female who has been undergoing our thorough preoperative screening process in anticipation of potential bariatric surgery.    At initial evaluation we recorded Aga Alanis's Height: 162.6 cm (5' 4\"), Initial Weight (lbs): 215 lbs and Initial BMI: 37.97.  The patient has been unsuccessful with other methods of permanent weight loss and suffers from multiple weight related medical conditions.  Due to lack of success in achieving weight loss through other methods, she is interested in undergoing bariatric surgery.    PREVIOUS WEIGHT LOSS ATTEMPTS:     2/10/2019   I have tried the following methods to lose weight Watching portions or calories, Exercise, Weight Watchers, Atkins type diet (low carb/high protein), OTC Medications       CO-MORBIDITIES OF OBESITY INCLUDE:     2/10/2019   I have the following health issues associated with obesity: Sleep Apnea, Polycystic Ovarian Syndrome, Weight Bearing Joint Pain       VITALS:  Ht 1.626 m (5' 4\")   Wt 82.1 kg (181 lb)   LMP 06/05/2017   BMI 31.07 kg/m      PE:  GENERAL: Alert and oriented x3. NAD  RESPIRATORY: Breathing unlabored  Rest of exam deferred due to virtual visit.    In summary, she has undergone an appropriate medical evaluation, dietitian evaluation, as well as psychologic screening. The patient appears to be an appropriate candidate for bariatric surgery.    In the office today, I discussed the laparoscopic gastric sleeve surgery.  Risks, benefits and anticipated outcomes were outlined including the risk of death, staple line leak (1-2%), PE, DVT, ulcer, worsening GERD, N/V, stricture, hernia, wound infection, weight regain, " and vitamin deficiencies. This patient has a good chance of sustaining permanent weight loss due to this procedure.  This should also allow improvement if not resolution of his/her weight related medical conditions.    At present we are going to present your patient's file for prior authorization to insurance.  Pending prior authorization, I anticipate a surgical date in the near future.  We will keep you updated on any progress.  If you have questions regarding care please feel free to contact me.  Total time spent in the clinic was 24 minutes with greater than 50% in face-to-face consultation.        Sincerely,    Davi Dee MD    Please route or send letter to:  Primary Care Provider (PCP) and Referring Provider        Nikhil Campbell MD - 08/30/2018  8:37 AM CDT  Formatting of this note may be different from the original.  [facility]   [address] [place] [address], [facility] [address], [place] [address]     Patient Name: Aga Alanis  Gender:  Female  Exam Date: 08/30/2018 Visit Number:  [id number]  Age: 38 Years YOB: 1980  Attending MD: Nikhil Campbell MD Medical Record#:  [id number]  -----------------------------------------------------------------------------------------------------------------------------   Procedure:    Upper GI Endoscopy   Indications:    Heartburn  Provider:        Nikhil Campbell MD   Referring MD: Michael Abbott MD   Primary MD:      Michael Abbott MD  Medications:   Admitting Medication:   0.9% Normal Saline at TKO   Intra Procedure Medications:   Patient received monitored anesthesia care.    Complications: No immediate complications  ___________________________________________________________________________________________  Procedure:   An examination of the heart and lungs was performed within acceptable limits. The patient was therefore deemed a reasonable candidate for sedation.   The risks and benefits were explained to the patient, who appeared to  understand. After obtaining informed consent, the scope was passed under direct vision. Throughout the procedure the patient's blood pressure, pulse and oxygen saturations were monitored.  The scope was introduced through the mouth and advanced to the third portion of duodenum.         Findings:   Esophagus:  Normal esophagus.   The z-line is 32 centimeters from the incisors.  Top of the gastric folds is 32 centimeters from the incisors.  Stomach:  H. Pylori biopsies taken.   The diaphragm hiatus is at 35 centimeters from the incisors.   Medium Hiatal Hernia.   Stomach erosion. Size - 3 mm. Number - single. Location - antrum. Biopsy taken of erosion(s). Maneuver - cold biopsy forceps.  Duodenum:  Normal duodenum.  Impression:   GERD without esophagitis  Hiatal hernia  Gastric erosion, unspecified chronicity  MD Impression Comments:  avoid NSAIDS and ASA. Continue omeprazole for now. If still problems make an appt. in the esophagus clinic     Pathology Results:  A: STOMACH, ANTRUM,  BIOPSY:  1. Reactive gastropathy with mucosal erosion (see comment)  2. No chronic gastritis is present  3. No Helicobacter organisms identified  B: STOMACH, BIOPSY:  1. Gastric antral and body mucosae with no diagnostic abnormalities  2. No evidence of Helicobacter organisms    MICROSCOPIC  A:  The gastric sampling includes antral mucosa only. The antral mucosa is involved by changes of reactive gastropathy. These changes include mild foveolar hyperplasia, mild epithelial reactive changes and mild fibrosis with negligible inflammation. Mucosal erosion is noted in the endoscopy findings. There is no evidence of atrophic gastritis and no Helicobacter organisms are identified.  B:  Performed  Electronically signed by: Neel Temple MD    Orders    Instruction(s)/Education:  Instruction/Education Timeframe Assessment   Hiatal Hernia  K44.9       _Electronically signed by:___________________  Roge Lawton MD                  08/30/2018    cc: Michael Abbott MD

## 2020-11-20 NOTE — PATIENT INSTRUCTIONS
"GOALS:  Relating To Eating:  Eat slowly (20-30 minutes per meal), chewing foods well (25 chews per bite/applesauce consistency). Stop as soon as you feel satisfied.   9\" Plate method (1/2 plate non-starchy vegetables/fruit, 1/4 plate lean protein, 1/4 plate whole grain starch - no more than 1/2 cup carb/meal)  Eat 3 meals per day      Relating to beverages:  Reduce caffeine/carbonation/calorie containing beverages  Separate fluids from meals by 30 minutes before, during, and after eating  Drinking a small sip of water every 15 minutes.     Relating to dietary supplements:  Start a multivitamin containing iron daily    Relating to activity:  Increase activity as able      *Protein Shake Criteria: no more than 250 Calories, at least 20 grams of protein, and less than 10 grams of sugar.   - Try Genpro unflavored for a protein powder it is wheat free. For a protein shake try 1/2 cup of fruit, almond milk and a scoop of protein powder.     Live-G free brand at [facility]'s     Follow up with RD in one month    Leila Garces, MS, RD, LD  If you need to schedule or reschedule with a dietitian please call [phone].  "

## 2020-11-24 ENCOUNTER — TELEPHONE (OUTPATIENT)
Dept: ENDOCRINOLOGY | Facility: CLINIC | Age: 40
End: 2020-11-24

## 2020-12-04 ENCOUNTER — CARE COORDINATION (OUTPATIENT)
Dept: ENDOCRINOLOGY | Facility: CLINIC | Age: 40
End: 2020-12-04

## 2020-12-04 NOTE — PROGRESS NOTES
Discussed tasklist with patient. Tentative date of 3/9/2021 or 2 weeks after husbands surgery if all tasks completed one month before.  Instructed to schedule a December dietitian visit.

## 2020-12-06 ENCOUNTER — HEALTH MAINTENANCE LETTER (OUTPATIENT)
Age: 40
End: 2020-12-06

## 2020-12-09 ENCOUNTER — TRANSFERRED RECORDS (OUTPATIENT)
Dept: HEALTH INFORMATION MANAGEMENT | Facility: CLINIC | Age: 40
End: 2020-12-09

## 2020-12-11 ENCOUNTER — CARE COORDINATION (OUTPATIENT)
Dept: ENDOCRINOLOGY | Facility: CLINIC | Age: 40
End: 2020-12-11

## 2020-12-12 NOTE — PROGRESS NOTES
Called and informed patient that [employer] requires, prior to bariatric surgery, 12 visits that show discussion of nutrition, physical activity and behavioral modification. Effective November 2020.  Recommend that she meet with dietitian monthly and the NP or PA-C monthly to have these visits.  Can also have faxed over notes from other providers that discuss the above.  Pt verbalized understanding of plan.

## 2020-12-17 ENCOUNTER — CARE COORDINATION (OUTPATIENT)
Dept: ENDOCRINOLOGY | Facility: CLINIC | Age: 40
End: 2020-12-17

## 2020-12-21 ENCOUNTER — VIRTUAL VISIT (OUTPATIENT)
Dept: ENDOCRINOLOGY | Facility: CLINIC | Age: 40
End: 2020-12-21
Payer: COMMERCIAL

## 2020-12-21 VITALS — HEIGHT: 64 IN | BODY MASS INDEX: 30.9 KG/M2 | WEIGHT: 181 LBS

## 2020-12-21 DIAGNOSIS — E66.811 CLASS 1 OBESITY DUE TO EXCESS CALORIES WITH SERIOUS COMORBIDITY AND BODY MASS INDEX (BMI) OF 31.0 TO 31.9 IN ADULT: Primary | ICD-10-CM

## 2020-12-21 DIAGNOSIS — E66.812 OBESITY, CLASS II, BMI 35-39.9: Primary | ICD-10-CM

## 2020-12-21 DIAGNOSIS — Z71.3 NUTRITIONAL COUNSELING: ICD-10-CM

## 2020-12-21 DIAGNOSIS — Z86.39 HISTORY OF MORBID OBESITY: ICD-10-CM

## 2020-12-21 DIAGNOSIS — E66.09 CLASS 1 OBESITY DUE TO EXCESS CALORIES WITH SERIOUS COMORBIDITY AND BODY MASS INDEX (BMI) OF 31.0 TO 31.9 IN ADULT: Primary | ICD-10-CM

## 2020-12-21 PROCEDURE — 99213 OFFICE O/P EST LOW 20 MIN: CPT | Mod: 95 | Performed by: PHYSICIAN ASSISTANT

## 2020-12-21 PROCEDURE — 97803 MED NUTRITION INDIV SUBSEQ: CPT | Mod: 95 | Performed by: DIETITIAN, REGISTERED

## 2020-12-21 RX ORDER — CALCIUM CARBONATE 260MG(650)
TABLET,CHEWABLE ORAL
COMMUNITY
End: 2021-01-20

## 2020-12-21 RX ORDER — BUSPIRONE HYDROCHLORIDE 15 MG/1
15 TABLET ORAL 2 TIMES DAILY
COMMUNITY

## 2020-12-21 ASSESSMENT — PAIN SCALES - GENERAL: PAINLEVEL: NO PAIN (0)

## 2020-12-21 ASSESSMENT — MIFFLIN-ST. JEOR: SCORE: 1466.01

## 2020-12-21 NOTE — PROGRESS NOTES
"Aga Alanis is a 40 year old female who is being evaluated via a billable video visit.      The patient has been notified of following:     \"This video visit will be conducted via a call between you and your physician/provider. We have found that certain health care needs can be provided without the need for an in-person physical exam.  This service lets us provide the care you need with a video conversation.  If a prescription is necessary we can send it directly to your pharmacy.  If lab work is needed we can place an order for that and you can then stop by our lab to have the test done at a later time.    Video visits are billed at different rates depending on your insurance coverage.  Please reach out to your insurance provider with any questions.    If during the course of the call the physician/provider feels a video visit is not appropriate, you will not be charged for this service.\"    Patient has given verbal consent for Video visit? Yes  How would you like to obtain your AVS? MyChart  If you are dropped from the video visit, the video invite should be resent to: Text to cell phone: [phone]  Will anyone else be joining your video visit? No  {If patient encounters technical issues they should call [phone] :[id number]}    During this virtual visit the patient is located in [place], patient verifies this as the location during the entirety of this visit.   Video-Visit Details    Type of service:  Video Visit    Video Start Time: {video visit start/end time:152948}  Video End Time: {video visit start/end time:152948}    Originating Location (pt. Location): {video visit patient location:932061::\"Home\"}    Distant Location (provider location):  [facility] [place]     Platform used for Video Visit: {Virtual Visit Platforms:878289::\"[employer]\"}    {signature options:958226}      "

## 2020-12-21 NOTE — NURSING NOTE
"Chief Complaint   Patient presents with     Follow Up     pre-surg       Vitals:    12/21/20 1050   Weight: 82.1 kg (181 lb)   Height: 1.626 m (5' 4\")       Body mass index is 31.07 kg/m .                              "

## 2020-12-21 NOTE — PATIENT INSTRUCTIONS
"GOALS:  Relating To Eating:  Eat slowly (20-30 minutes per meal), chewing foods well (25 chews per bite/applesauce consistency). Stop as soon as you feel satisfied.   9\" Plate method (1/2 plate non-starchy vegetables/fruit, 1/4 plate lean protein, 1/4 plate whole grain starch - no more than 1/2 cup carb/meal)  Eat 3 meals per day        Relating to beverages:  Reduce caffeine/carbonation/calorie containing beverages  Separate fluids from meals by 30 minutes before, during, and after eating  Drinking a small sip of water every 15 minutes.      Relating to dietary supplements:  Start a multivitamin containing iron daily     Relating to activity:  Increase activity as able        *Protein Shake Criteria: no more than 250 Calories, at least 20 grams of protein, and less than 10 grams of sugar.   - Try Genpro unflavored for a protein powder it is wheat free. For a protein shake try 1/2 cup of fruit, almond milk and a scoop of protein powder.   - Can also try the [employer] Corepower.     Follow up with RD in one month    Leila Garces, MS, RD, LD  If you need to schedule or reschedule with a dietitian please call [phone].  "

## 2020-12-21 NOTE — PROGRESS NOTES
"Aga Alanis is a 40 year old female who is being evaluated via a billable video visit.       The patient has been notified of following:      \"This video visit will be conducted via a call between you and your physician/provider. We have found that certain health care needs can be provided without the need for an in-person physical exam.  This service lets us provide the care you need with a video conversation.  If a prescription is necessary we can send it directly to your pharmacy.  If lab work is needed we can place an order for that and you can then stop by our lab to have the test done at a later time.     Video visits are billed at different rates depending on your insurance coverage.  Please reach out to your insurance provider with any questions.     If during the course of the call the physician/provider feels a video visit is not appropriate, you will not be charged for this service.\"     Patient has given verbal consent for Video visit? Yes  How would you like to obtain your AVS? MyChart  If you are dropped from the video visit, the video invite should be resent to: Text to cell phone: [phone]  Will anyone else be joining your video visit? No        Video-Visit Details     Type of service:  Video Visit     Video Start Time: 11:44 AM   Video End Time: 12:04 PM    Originating Location (pt. Location): Home     Distant Location (provider location):  [facility] [place]      Platform used for Video Visit: [employer]     During this virtual visit the patient is located in [place], patient verifies this as the location during the entirety of this visit.         Return Bariatric Nutrition Consultation Note     Reason For Visit: Nutrition Assessment     Aga Alanis is a 39 year old presenting today for follow-up bariatric nutrition consult.  Pt is interested in laparoscopic sleeve gastrectomy with Dr. Roberts expected surgery in Febuary 2021.  Patient is " "accompanied by self.  This is pt's 2 of 3 required nutrition visits prior to surgery. Pt referred by RICHARD Dumont (2/13/19).   - pt stated the surgery process in 2019. Since then she has changed insurance and now need 3 RD visits instead of 6.         Support System Reviewed With Patient 2/10/2019   Who do you have in your support network that can be available to help you for the first 2 weeks after surgery? yes   Who can you count on for support throughout your weight loss surgery journey? my , both in laws, aunt, children         ANTHROPOMETRICS:  Estimated body mass index is 37.89 kg/m  as calculated from the following:    Height as of 2/13/19: 1.607 m (5' 3.25\").    Weight as of 2/13/19: 97.8 kg (215 lb 9.6 oz).   Current weight: 181 lb ( per pt's report)      Required weight loss goal pre-op: 205 lbs from initial consult weight (goal weight 10 lbs or less before surgery)        2/10/2019   I have tried the following methods to lose weight Watching portions or calories, Exercise, Weight Watchers, Atkins type diet (low carb/high protein), OTC Medications         Weight Loss Questions Reviewed With Patient 2/10/2019   How long have you been overweight? Since late 20's to early 40's         SUPPLEMENT INFORMATION:  Did not review today     NUTRITION HISTORY:     Diet Recall  B: Skips OR eggs or starbucks egg bites OR Just crack and egg   Lunch: resturant  D: [place] a lot of dinner: pelt grill chicke or pork OR GF pizza or CF chicken nuggests        Progress Towards Previous Goals:  Relating To Eating:  Eat slowly (20-30 minutes per meal), chewing foods well (25 chews per bite/applesauce consistency). Stop as soon as you feel satisfied. -Continues, Trying to eat slower and chew up more.  9\" Plate method (1/2 plate non-starchy vegetables/fruit, 1/4 plate lean protein, 1/4 plate whole grain starch - no more than 1/2 cup carb/meal)-Met  Eat 3 meals per day-Met, notice some snacking around her previous cycle " "time.      Relating to beverages:  Reduce caffeine/carbonation/calorie containing beverages-Met, water, 1 cup of coffee a day, honey citrus mint tea ( 1/2 lemonade and tea) with crystal lemonade.   Separate fluids from meals by 30 minutes before, during, and after eating-Continues  Drinking a small sip of water every 15 minutes. -continues, reports this is still difficult.      Relating to dietary supplements:  Start a multivitamin containing iron daily-Did not discuss today      Relating to activity:  Increase activity as able-Met     ADDITIONAL INFORMATION:  Pt prefers to eat organic foods, and she has a daughter that is lactose intolerant.      In the past she has lost weight from intermittent fasting from 12-18 fasts.        NUTRITION DIAGNOSIS:  Overweight r/t long history of self-monitoring deficit and excessive energy intake aeb BMI >25 kg/m2.-improving      INTERVENTION:  Intervention Provided/Education:  1. Reviewed and modified goals  2. Reviewed post-op diet guidelines, ways to help prepare for post-op diet guidelines pre-operatively, portion/calorie-control, mindful eating, sources of protein.   3. Praised patient for diet and lifestyle changes and weight loss.   4.  AVS via Plored        Questions Reviewed With Patient 2/10/2019   How ready are you to make changes regarding your weight? Number 1 = Not ready at all to make changes up to 10 = very ready. 10   How confident are you that you can change? 1 = Not confident that you will be successful making changes up to 10 = very confident. 10         Patient Understanding: good  Expected Compliance: good     GOALS:  Relating To Eating:  Eat slowly (20-30 minutes per meal), chewing foods well (25 chews per bite/applesauce consistency). Stop as soon as you feel satisfied.   9\" Plate method (1/2 plate non-starchy vegetables/fruit, 1/4 plate lean protein, 1/4 plate whole grain starch - no more than 1/2 cup carb/meal)  Eat 3 meals per day        Relating to " beverages:  Reduce caffeine/carbonation/calorie containing beverages  Separate fluids from meals by 30 minutes before, during, and after eating  Drinking a small sip of water every 15 minutes.      Relating to dietary supplements:  Start a multivitamin containing iron daily     Relating to activity:  Increase activity as able        *Protein Shake Criteria: no more than 250 Calories, at least 20 grams of protein, and less than 10 grams of sugar.   - Try Genpro unflavored for a protein powder it is wheat free. For a protein shake try 1/2 cup of fruit, almond milk and a scoop of protein powder.   - Can also try the [employer] Corepower.     Live-G free brand at [facility]'s         Follow-Up:   Recommend 1 month  follow up visits to assist with lifestyle changes or per insurance.     Time spent with patient: 20 minutes.  Leila Garces, MS, RD, LD

## 2020-12-21 NOTE — LETTER
"2020       RE: Aga Alanis  [address] [facility] [address]     Dear Colleague,    Thank you for referring your patient, Aga Alanis, to the [facility] WEIGHT MANAGEMENT CLINIC [place] at [facility]. Please see a copy of my visit note below.    Aga Alanis is a 40 year old female who is being evaluated via a billable video visit.      The patient has been notified of following:     \"This video visit will be conducted via a call between you and your physician/provider. We have found that certain health care needs can be provided without the need for an in-person physical exam.  This service lets us provide the care you need with a video conversation.  If a prescription is necessary we can send it directly to your pharmacy.  If lab work is needed we can place an order for that and you can then stop by our lab to have the test done at a later time.    Video visits are billed at different rates depending on your insurance coverage.  Please reach out to your insurance provider with any questions.    If during the course of the call the physician/provider feels a video visit is not appropriate, you will not be charged for this service.\"    Patient has given verbal consent for Video visit? Yes  How would you like to obtain your AVS? MyChart  If you are dropped from the video visit, the video invite should be resent to: Text to cell phone: [phone]  Will anyone else be joining your video visit? No      During this virtual visit the patient is located in [place], patient verifies this as the location during the entirety of this visit.           Pre-Bariatric Surgery Note    Michael Abbott    Date: 2020     RE: Aga Alanis    MR#: [id number]   : 1980   Date of Visit: Dec 21, 2020    REASON FOR VISIT: Preoperative evaluation for possible weight loss surgery    Dear Michael Linton,    I had the pleasure of " seeing your patient, Aga Alanis, in my preoperative bariatric clinic.    As you know, she is morbidly obese and considering weight loss surgery to treat obesity in association with her medical conditions of obesity.  Her consult weight was 215.  She has lost 34 pounds since her consult weight. She has met her required pre-surgery weight.    Endo at [place] had her on phentermine 15mg in the past and worked well but caused a lot of irritability. She thinks the irritability was possibly related to depression/anxiety.      She is now taking lomera 8mg which helps during the day but she eats a lot in the evening.      Tried topiramate but caused irritability.    Sleep doctor wants her to repeat sleep study.  Pt reports medical marijuana is helping her sleep.  Sleep doctor wants her to get oral appliance but she doesn't have insurance coverage at     Hematologist letter sent to us.  Factor V     Most recent weights:  Wt Readings from Last 4 Encounters:   12/21/20 82.1 kg (181 lb)   11/19/20 82.1 kg (181 lb)   02/13/19 97.8 kg (215 lb 9.6 oz)   12/29/18 88.9 kg (196 lb)       Please refer to initial consult note from date 2/13/19 for patient's weight history and co-morbidities.    ROS    Past Medical History:   Diagnosis Date     DVT (deep venous thrombosis) (H)      Ovarian cyst        Past Surgical History:   Procedure Laterality Date     COSMETIC SURGERY      breast augmentation and tummy tuck 2017     ENT SURGERY      tubes      EXAM UNDER ANESTHESIA PELVIC N/A 12/29/2018    Procedure: Vaginal exam under anesthesia, repair of vaginal cuff;  Surgeon: Bev Fischer MD;  Location: [facility] OR     GYN SURGERY      ovarian cyst , tubes tied      HYSTERECTOMY       ORTHOPEDIC SURGERY      knee right      REPAIR LACERATION VAGINA N/A 12/29/2018    Procedure: Repair of vaginal laceration;  Surgeon: Bev Fischer MD;  Location: [facility] OR       Current Outpatient Medications   Medication     ALPRAZolam (XANAX) 0.25 MG tablet      "Biotin 10 MG TABS tablet     busPIRone (BUSPAR) 15 MG tablet     cetirizine (ZYRTEC ALLERGY) 10 MG tablet     Cholecalciferol (VITAMIN D3) 1000 UNITS CAPS     lactobacillus rhamnosus, GG, (CULTURELL) capsule     LOMAIRA 8 MG tablet     metFORMIN (GLUCOPHAGE-XR) 500 MG 24 hr tablet     Methylcobalamin (B-12) 1000 MCG TBDP     spironolactone (ALDACTONE) 100 MG tablet     valACYclovir (VALTREX) 1000 mg tablet     VIIBRYD 40 MG TABS tablet     Zinc 10 MG LOZG     Azelastine HCl 137 MCG/SPRAY SOLN     clindamycin (CLEOCIN T) 1 % external lotion     desvenlafaxine succinate (PRISTIQ) 100 MG 24 hr tablet     fluconazole (DIFLUCAN) 150 MG tablet     mirtazapine (REMERON) 15 MG tablet     Omega-3 Fatty Acids (FISH OIL) 1200 MG capsule     omeprazole (PRILOSEC) 20 MG CR capsule     topiramate (TOPAMAX) 25 MG tablet     No current facility-administered medications for this visit.        Allergies   Allergen Reactions     Codeine Sulfate      Demerol      Sulfa Drugs      Wheat Bran Other (See Comments)     Food allergy tested and confirmed by [facility]     Flagyl [Metronidazole] Rash       PHYSICAL EXAMINATION:  Ht 1.626 m (5' 4\")   Wt 82.1 kg (181 lb)   LMP 06/05/2017   BMI 31.07 kg/m     Body mass index is 31.07 kg/m .   NAD NCAT  Respiratory: breathing unlabored    I emphasized exercise and activity behavior along with appropriate food choice as the main foundation for weight loss with surgery providing surgical reinforcement of the appropriate behavior set.    PLAN:  Pre weight loss surgery  Hoping to have surgery in March,  having surgery in Feb  Sleep eval not needed.  Hx of mild sleep apnea with 34 lbs weight loss since with no symptoms of sleep apnea.  Hold off on phentermine for now.  Low dose 8mg was helping during the day but then she would over eat at night.      Review of general surgery weight loss process    1. Complete preoperative requirements, including weight loss.  Final weight check to confirm " MANDATORY weight loss requirement must be documented on a clinic scale.    2. Discuss prior authorization with .    3. History and physical evaluation by PCP of PAC clinic within 30 days of surgery date, preoperative class, and weight check (weigh-in visit) to be scheduled by patient.  Pre-anesthesia clinic for risk evaluation to be scheduled by anesthesia clinic.    4. We cannot guarantee that patient will qualify for surgery unless all preoperative requirements are met, prior authorization from primary insurance company is granted, and insurance changes do not occur.    5. It is possible for patients to regain all weight after weight loss surgery unless they follow guidelines prescribed by our bariatric center.    6. All patients with gastrointestinal complaints after weight loss surgery must have complaints conveyed to the bariatric team for appropriate treatment.    7. Vitamin deficiencies may develop post-bariatric surgery and annual laboratory testing should be performed.    8. Persistent nausea/vomiting after bariatric surgery entails risk of thiamine deficiency and should be treated early.  Vitamin B12 deficiency may develop, especially after gastric bypass surgery and must be recognized.        If you have any questions about our plans please don't hesitate to contact me.    Sincerely,    Zuri Dodd PA-C        Video-Visit Details    Type of service:  Video Visit    Video Start Time: 1142    Video End Time: 1205    Originating Location (pt. Location): Home    Distant Location (provider location):  [facility] WEIGHT MANAGEMENT CLINIC [place]     Platform used for Video Visit: Julio Cesar Dodd PA-C

## 2020-12-21 NOTE — LETTER
"12/21/2020       RE: Aga Alanis  [address] [place] [place] [address]     Dear Colleague,    Thank you for referring your patient, Aga Alanis, to the [facility] WE[facility] MANAGEMENT CLINIC [place] at [facility]. Please see a copy of my visit note below.    Aga Alanis is a 40 year old female who is being evaluated via a billable video visit.       The patient has been notified of following:      \"This video visit will be conducted via a call between you and your physician/provider. We have found that certain health care needs can be provided without the need for an in-person physical exam.  This service lets us provide the care you need with a video conversation.  If a prescription is necessary we can send it directly to your pharmacy.  If lab work is needed we can place an order for that and you can then stop by our lab to have the test done at a later time.     Video visits are billed at different rates depending on your insurance coverage.  Please reach out to your insurance provider with any questions.     If during the course of the call the physician/provider feels a video visit is not appropriate, you will not be charged for this service.\"     Patient has given verbal consent for Video visit? Yes  How would you like to obtain your AVS? MyChart  If you are dropped from the video visit, the video invite should be resent to: Text to cell phone: [phone]  Will anyone else be joining your video visit? No        Video-Visit Details     Type of service:  Video Visit     Video Start Time: 11:44 AM   Video End Time: 12:04 PM    Originating Location (pt. Location): Home     Distant Location (provider location):  [facility] WEIGHT MANAGEMENT [facility]      Platform used for Video Visit: [employer]     During this virtual visit the patient is located in [place], patient verifies this as the location during the entirety " "of this visit.         Return Bariatric Nutrition Consultation Note     Reason For Visit: Nutrition Assessment     Aga Alanis is a 39 year old presenting today for follow-up bariatric nutrition consult.  Pt is interested in laparoscopic sleeve gastrectomy with Dr. Roberts expected surgery in Febuary 2021.  Patient is accompanied by self.  This is pt's 2 of 3 required nutrition visits prior to surgery. Pt referred by RICHARD Dumont (2/13/19).   - pt stated the surgery process in 2019. Since then she has changed insurance and now need 3 RD visits instead of 6.         Support System Reviewed With Patient 2/10/2019   Who do you have in your support network that can be available to help you for the first 2 weeks after surgery? yes   Who can you count on for support throughout your weight loss surgery journey? my , both in laws, aunt, children         ANTHROPOMETRICS:  Estimated body mass index is 37.89 kg/m  as calculated from the following:    Height as of 2/13/19: 1.607 m (5' 3.25\").    Weight as of 2/13/19: 97.8 kg (215 lb 9.6 oz).   Current weight: 181 lb ( per pt's report)      Required weight loss goal pre-op: 205 lbs from initial consult weight (goal weight 10 lbs or less before surgery)        2/10/2019   I have tried the following methods to lose weight Watching portions or calories, Exercise, Weight Watchers, Atkins type diet (low carb/high protein), OTC Medications         Weight Loss Questions Reviewed With Patient 2/10/2019   How long have you been overweight? Since late 20's to early 40's         SUPPLEMENT INFORMATION:  Did not review today     NUTRITION HISTORY:     Diet Recall  B: Skips OR eggs or starbucks egg bites OR Just crack and egg   Lunch: resturant  D: [place] a lot of dinner: pelt grill chicke or pork OR GF pizza or CF chicken nuggests        Progress Towards Previous Goals:  Relating To Eating:  Eat slowly (20-30 minutes per meal), chewing foods well (25 chews per " "bite/applesauce consistency). Stop as soon as you feel satisfied. -Continues, Trying to eat slower and chew up more.  9\" Plate method (1/2 plate non-starchy vegetables/fruit, 1/4 plate lean protein, 1/4 plate whole grain starch - no more than 1/2 cup carb/meal)-Met  Eat 3 meals per day-Met, notice some snacking around her previous cycle time.      Relating to beverages:  Reduce caffeine/carbonation/calorie containing beverages-Met, water, 1 cup of coffee a day, honey citrus mint tea ( 1/2 lemonade and tea) with crystal lemonade.   Separate fluids from meals by 30 minutes before, during, and after eating-Continues  Drinking a small sip of water every 15 minutes. -continues, reports this is still difficult.      Relating to dietary supplements:  Start a multivitamin containing iron daily-Did not discuss today      Relating to activity:  Increase activity as able-Met     ADDITIONAL INFORMATION:  Pt prefers to eat organic foods, and she has a daughter that is lactose intolerant.      In the past she has lost weight from intermittent fasting from 12-18 fasts.        NUTRITION DIAGNOSIS:  Overweight r/t long history of self-monitoring deficit and excessive energy intake aeb BMI >25 kg/m2.-improving      INTERVENTION:  Intervention Provided/Education:  1. Reviewed and modified goals  2. Reviewed post-op diet guidelines, ways to help prepare for post-op diet guidelines pre-operatively, portion/calorie-control, mindful eating, sources of protein.   3. Praised patient for diet and lifestyle changes and weight loss.   4.  AVS via [employer]        Questions Reviewed With Patient 2/10/2019   How ready are you to make changes regarding your weight? Number 1 = Not ready at all to make changes up to 10 = very ready. 10   How confident are you that you can change? 1 = Not confident that you will be successful making changes up to 10 = very confident. 10         Patient Understanding: good  Expected Compliance: " "good     GOALS:  Relating To Eating:  Eat slowly (20-30 minutes per meal), chewing foods well (25 chews per bite/applesauce consistency). Stop as soon as you feel satisfied.   9\" Plate method (1/2 plate non-starchy vegetables/fruit, 1/4 plate lean protein, 1/4 plate whole grain starch - no more than 1/2 cup carb/meal)  Eat 3 meals per day        Relating to beverages:  Reduce caffeine/carbonation/calorie containing beverages  Separate fluids from meals by 30 minutes before, during, and after eating  Drinking a small sip of water every 15 minutes.      Relating to dietary supplements:  Start a multivitamin containing iron daily     Relating to activity:  Increase activity as able        *Protein Shake Criteria: no more than 250 Calories, at least 20 grams of protein, and less than 10 grams of sugar.   - Try Genpro unflavored for a protein powder it is wheat free. For a protein shake try 1/2 cup of fruit, almond milk and a scoop of protein powder.   - Can also try the [employer].     Live-G free brand at [facility]'s         Follow-Up:   Recommend 1 month  follow up visits to assist with lifestyle changes or per insurance.     Time spent with patient: 20 minutes.  Leila Garces, MS, RD, LD      "

## 2020-12-23 NOTE — PROGRESS NOTES
"Aga Alanis is a 40 year old female who is being evaluated via a billable video visit.      The patient has been notified of following:     \"This video visit will be conducted via a call between you and your physician/provider. We have found that certain health care needs can be provided without the need for an in-person physical exam.  This service lets us provide the care you need with a video conversation.  If a prescription is necessary we can send it directly to your pharmacy.  If lab work is needed we can place an order for that and you can then stop by our lab to have the test done at a later time.    Video visits are billed at different rates depending on your insurance coverage.  Please reach out to your insurance provider with any questions.    If during the course of the call the physician/provider feels a video visit is not appropriate, you will not be charged for this service.\"    Patient has given verbal consent for Video visit? Yes  How would you like to obtain your AVS? MyChart  If you are dropped from the video visit, the video invite should be resent to: Text to cell phone: [phone]  Will anyone else be joining your video visit? No      During this virtual visit the patient is located in [place], patient verifies this as the location during the entirety of this visit.           Pre-Bariatric Surgery Note    Michael Abbott    Date: 2020     RE: Aga Alanis    MR#: [id number]   : 1980   Date of Visit: Dec 21, 2020    REASON FOR VISIT: Preoperative evaluation for possible weight loss surgery    Dear Michael Linton,    I had the pleasure of seeing your patient, Aga Alanis, in my preoperative bariatric clinic.    As you know, she is morbidly obese and considering weight loss surgery to treat obesity in association with her medical conditions of obesity.  Her consult weight was 215.  She has lost 34 pounds since her consult weight. She has met her required pre-surgery " weight.    Hany at [place] had her on phentermine 15mg in the past and worked well but caused a lot of irritability. She thinks the irritability was possibly related to depression/anxiety.      She is now taking lomera 8mg which helps during the day but she eats a lot in the evening.      Tried topiramate but caused irritability.    Sleep doctor wants her to repeat sleep study.  Pt reports medical marijuana is helping her sleep.  Sleep doctor wants her to get oral appliance but she doesn't have insurance coverage at     Hematologist letter sent to us.  Factor V     Most recent weights:  Wt Readings from Last 4 Encounters:   12/21/20 82.1 kg (181 lb)   11/19/20 82.1 kg (181 lb)   02/13/19 97.8 kg (215 lb 9.6 oz)   12/29/18 88.9 kg (196 lb)       Please refer to initial consult note from date 2/13/19 for patient's weight history and co-morbidities.    ROS    Past Medical History:   Diagnosis Date     DVT (deep venous thrombosis) (H)      Ovarian cyst        Past Surgical History:   Procedure Laterality Date     COSMETIC SURGERY      breast augmentation and tummy tuck 2017     ENT SURGERY      tubes      EXAM UNDER ANESTHESIA PELVIC N/A 12/29/2018    Procedure: Vaginal exam under anesthesia, repair of vaginal cuff;  Surgeon: Bev Fischer MD;  Location: [facility] OR     GYN SURGERY      ovarian cyst , tubes tied      HYSTERECTOMY       ORTHOPEDIC SURGERY      knee right      REPAIR LACERATION VAGINA N/A 12/29/2018    Procedure: Repair of vaginal laceration;  Surgeon: Bev Fischer MD;  Location:  OR       Current Outpatient Medications   Medication     ALPRAZolam (XANAX) 0.25 MG tablet     Biotin 10 MG TABS tablet     busPIRone (BUSPAR) 15 MG tablet     cetirizine (ZYRTEC ALLERGY) 10 MG tablet     Cholecalciferol (VITAMIN D3) 1000 UNITS CAPS     lactobacillus rhamnosus, GG, (CULTURELL) capsule     LOMAIRA 8 MG tablet     metFORMIN (GLUCOPHAGE-XR) 500 MG 24 hr tablet     Methylcobalamin (B-12) 1000 MCG TBDP     spironolactone  "(ALDACTONE) 100 MG tablet     valACYclovir (VALTREX) 1000 mg tablet     VIIBRYD 40 MG TABS tablet     Zinc 10 MG LOZG     Azelastine HCl 137 MCG/SPRAY SOLN     clindamycin (CLEOCIN T) 1 % external lotion     desvenlafaxine succinate (PRISTIQ) 100 MG 24 hr tablet     fluconazole (DIFLUCAN) 150 MG tablet     mirtazapine (REMERON) 15 MG tablet     Omega-3 Fatty Acids (FISH OIL) 1200 MG capsule     omeprazole (PRILOSEC) 20 MG CR capsule     topiramate (TOPAMAX) 25 MG tablet     No current facility-administered medications for this visit.        Allergies   Allergen Reactions     Codeine Sulfate      Demerol      Sulfa Drugs      Wheat Bran Other (See Comments)     Food allergy tested and confirmed by [facility]     Flagyl [Metronidazole] Rash       PHYSICAL EXAMINATION:  Ht 1.626 m (5' 4\")   Wt 82.1 kg (181 lb)   LMP 06/05/2017   BMI 31.07 kg/m     Body mass index is 31.07 kg/m .   NAD NCAT  Respiratory: breathing unlabored    I emphasized exercise and activity behavior along with appropriate food choice as the main foundation for weight loss with surgery providing surgical reinforcement of the appropriate behavior set.    PLAN:  Pre weight loss surgery  Hoping to have surgery in March,  having surgery in Feb  Sleep eval not needed.  Hx of mild sleep apnea with 34 lbs weight loss since with no symptoms of sleep apnea.  Hold off on phentermine for now.  Low dose 8mg was helping during the day but then she would over eat at night.        Review of general surgery weight loss process    1. Complete preoperative requirements, including weight loss.  Final weight check to confirm MANDATORY weight loss requirement must be documented on a clinic scale.    2. Discuss prior authorization with .    3. History and physical evaluation by PCP of PAC clinic within 30 days of surgery date, preoperative class, and weight check (weigh-in visit) to be scheduled by patient.  Pre-anesthesia clinic for risk " evaluation to be scheduled by anesthesia clinic.    4. We cannot guarantee that patient will qualify for surgery unless all preoperative requirements are met, prior authorization from primary insurance company is granted, and insurance changes do not occur.    5. It is possible for patients to regain all weight after weight loss surgery unless they follow guidelines prescribed by our bariatric center.    6. All patients with gastrointestinal complaints after weight loss surgery must have complaints conveyed to the bariatric team for appropriate treatment.    7. Vitamin deficiencies may develop post-bariatric surgery and annual laboratory testing should be performed.    8. Persistent nausea/vomiting after bariatric surgery entails risk of thiamine deficiency and should be treated early.  Vitamin B12 deficiency may develop, especially after gastric bypass surgery and must be recognized.        If you have any questions about our plans please don't hesitate to contact me.    Sincerely,    Zuri Dodd PA-C        Video-Visit Details    Type of service:  Video Visit    Video Start Time: 1142    Video End Time: 1205    Originating Location (pt. Location): Home    Distant Location (provider location):  [facility] WEIGHT MANAGEMENT CLINIC [place]     Platform used for Video Visit: Julio Cesar Dodd PA-C

## 2020-12-30 ENCOUNTER — CARE COORDINATION (OUTPATIENT)
Dept: ENDOCRINOLOGY | Facility: CLINIC | Age: 40
End: 2020-12-30

## 2020-12-30 DIAGNOSIS — E66.9 OBESITY: Primary | ICD-10-CM

## 2020-12-30 NOTE — PROGRESS NOTES
Date: 12/30/2020  Session Length: 20  minutes  Session #: 2    Treatment Objective(s) Addressed in this Session:  _x__Weight management  _x__Regular exercise  _x__Stress management  _x__Healthy eating  _x__Sleep Health    NUTRITION  Current Stressors/Issues:  Not really.  What patient does well:  Drinking water.  Previous Successes:  Drinking water.  Keep water bottle by sink.  Areas in need of improvement:  Working on smaller drinks of water and slower.  Be conscious of when I am eating. I have coffee in the am then super hungry in the evening.  Barriers to change:  I forget, and then feel thirsty.  Reasons for change:  Plan/goal for next 4 weeks:  Set a timer for every 15-60 minutes to remind me to drink water.  Sip water before and after a procedure with a client.    PHYSICAL ACTIVITY  Current Stressors/Issues:  Not being more active.  What patient does well:  [place] further away from work and locations to increase walking.  Previous Successes:  [employer] [place] on Phone  Ordered yoga mat.  Looked at yoga classes.  Areas in need of improvement:  Being more active.  Barriers to change:  None  Reasons for change:  Overall stress reliever and physical activity.  Plan/goal for next 4 weeks:  To try twice a week, 20 minutes.    BEHAVIORAL MODIFICATION  Current Stressors/Issues:  To get more sleep.  What patient does well:  Set a timer on phone to go to bed earlier.  Previous Successes:  For a week.  Areas in need of improvement:  To continue the timer.  Barriers to change:  None.  Reasons for change:  Helping me to go to sleep earlier.  Plan/goal for next 4 weeks:  Set a timer on phone to go to bed earlier.    Interventions:  __x___Motivational Interviewing, MI Intervention: Expressed empathy/understanding, supported autonomy, collaboration, evocation, permission to raise concerns or advise, open-ended questions. Reflections: simple and complex, Change talk (evoked), and reframe.  __x___Change Talk expressed by the Patient:  Desire to change, ability to change, reasons to change, commitment to change, activation, taking steps.  __x___Provider Response to Change Talk: E- Evoked more info from patient about behavior change, A-Affirmed patient s thoughts, decisions, or attempts at behavior change. R- Reflected patient s change talk and S - Summarized patient s change talk statements.

## 2020-12-30 NOTE — PROGRESS NOTES
Tasklist updated and sent to patient via [facility]`.    Bariatric Task List  Status:  Is patient a candidate for bariatric surgery?:  patient is a candidate for bariatric surgery -     Cleared to schedule surgeon consult?:  cleared to schedule surgeon consult - 11/19/20 visit. [facility]   Status:  surgery evaluation in process -     Surgeon: Dr Dee -     Tentative surgery month/year: 03/09/2021  -        Insurance: Insurance:  [facility], on 1/1/2021 change to [employer]. Call [phone] with updated insurance info when you get it. [facility] -        Patient Info: Initial Weight:  215 -     Date of Initial Weight/Height:  2/13/2019 -     Goal Weight (lbs):  205 -     Required Weight Loss:  10 -     Surgery Type:  sleeve gastrectomy - with hiatal hernia repair      Dietician Visits: Structured weight loss required by insurance?:  structured weight loss required - # RD visits if exercise program also discussed. [facility]   Dietician Visit 1:  Completed - 10/21/20 in Epic. [facility]   Dietician Visit 2:  Completed - 11/19/20 in Epic. [facility]   Dietician Visit 3:  Completed - 12/21/20 in Epic. [facility]   Dietician Visit 4:    -     Dietician Visit 5:    -     Dietician Visit 6:    -     Dietician Visit additional:    -  To schedule for postop diet teaching and monthly dietitian support with weight loss call [phone]. [facility]      Psychological Evaluation: Psych eval:  Needed - 1/18/20 Dr Zhang appt. [facility]   Therapist letter of support:  Needed - Psychologist.   Psychiatrist letter of support:  Needed - Alejandra Vogt, MSN,APRN,CNP (prescribes meds), 12/30/20 left message requesting a letter.      Lab Work: Complete Blood Count:  Completed - 11/28/20 in [facility]. [facility]   Comprehensive Metabolic Panel:  Needed -     Vitamin D:  Needed -     Hgb A1c:  Needed -     PTH:  Needed -     Other:  Needed - lipids      Consults/ Clearance: Sleep Medicine:    - Received sleep study results from 2017.   Hematology:  Completed - 2/21/2019 HEM Clearance from Dr Abhijit Jones; ok to use  "2/21/19 clearance and discussed with Zuri Dodd PA-C, recommended lovenox 40mg daily x 2 weeks after surgery. bks   Medical Weight Management:   -        Testing: EGD:  Completed -        PCP: Establish care with PCP:  Completed -     PCP letter of support:  Completed - 12/7/20 ltr from Michael Abbott MD via [email or web address] on 12/16/2020      Patient Education:  Information Session:  Completed -     Given \"Making your decision\" handout?:  Given \"\"Making your decision\"\" handout? -     Given support group information?:  support group contact information provided -     Attended support group?:    -     Support plan in place?:  Completed -     Research consents signed?:  research consent signed -        Additional Surgery Requirements: Other Requirements:  Call [phone] to schedule closer to home. - Covid test 4 days before   Other Requirements:  Someone from the Call Center will  call you to schedule this. bks - Postop appts at 1 week and 31+ days. bks      Final Tasks:  Before surgery online class:  Needed - Read Script. See link below.   Before surgery online class website link:  [email or web address]   After surgery online class:  Needed - Read script. See link below.   After surgery online class website link:  [email or web address]   Nurse visit for weigh-in and information:  Needed - Marylou RN to call and set this up. bks   Pre-assessment clinic visit with anesthesia team for H&P:  Needed - To call [phone] to schedule this for 3 weeks beofre surgery. bks   Final labs (Hgb, plt, T&S, UA):  Needed - To be done after the Pre-assessment clinic visit. bks      Notes: Please register for the Get Well Loop when you get an email invitation and a surgery date.     The Get Well Loop will give you information via email or text messages that can help you be more successful before and after surgery.  It can also help ans,wer any questions you may have.    Scripts for online " classes:  [email or web address]    [email or web address]    Multidisciplinary Approach to weight loss: Need 12 or more visits within 2 years, can be more than 2 year if most recent are within 2 years. Need to discuss nutrition, physical activity, behavior modification/change.  Vis,its  1. 2/13/19 Zuri Dodd PA-C New Bariatric Consult  2. 2/13/19 Leila Garces RD Dietitian Visit  3. 3/13/19 Leila Garces RD Dietitian Visit  4. 4/17/19 Michael Abbott -discussed healthy eating, physical activity.  5. 4/19/19 Jose Dubose MD, PhD ([facility]) - discussed diet, exercise, on CPAP for NNEKA.  6. 7/26/19 Jose Dubose MD, PhD (Nutrition Clinic) - discussed wheat intolerance, diet, physical activity, wt mgmt program.  7. 10/21/20 Leila Garces RD Dietitian Visit  8. 11/19/20 Leila Garces RD Dietitian Visit  9. 12/17/20 Irina Cole RN, CIC, CBN, Health Coaching  10. 12/21/20 Leila Garces RD Dietitian Visit  11. 12/21/20 Leila Dodd PA-C - discussed diet, activity.  12. 12/30/20 Irina Cole RN, CIC, CBN, Health Coaching  13. 1/18/20 Dr Zhang - psychological evaluation appt.

## 2021-01-06 ENCOUNTER — CARE COORDINATION (OUTPATIENT)
Dept: ENDOCRINOLOGY | Facility: CLINIC | Age: 41
End: 2021-01-06

## 2021-01-06 NOTE — PROGRESS NOTES
Patient Name: Aga Alanis  Date: 01/06/2021  Session Length: 30 minutes Session #: 3      Treatment Objective(s) Addressed in this Session:  _x_Weight management  _x__Regular exercise  _x__Stress management  _x__Healthy eating    NUTRITION  Current Stressors/Issues:   Nope  What patient does well:   Eating healthier.  Reducing processed foods and eating raw foods.  Previous Successes:  Reducing processed foods and eating raw food. Eating a serving of vegetables and fruit every day.  Areas in need of improvement:  Continue to not eat processed foods.  Reduce amount of processed food.  Barriers to change:  Not having it in the house can take away a barrier.  Reasons for change:   The Scale.  Plan/goal for next week:  Avoid processed foods.  Eat a serving of vegetables and fruit every day.  Still work on drinking smaller amounts more frequently.    PHYSICAL ACTIVITY  Current Stressors/Issues:  All beginner yoga videos have blocks, so ordered, and to arrive today.  What patient does well:  Ongoing learner.  Previous Successes:  Reviewed videos and learned about the different poses.  Areas in need of improvement:  Do a full 20 minute video.  Barriers to change:  Not having the equipment.  Reasons for change:  Reduce stress and flexibility.  Plan/goal for next week:  Do a full 20 minute video.    BEHAVIORAL MODIFICATION  Current Stressors/Issues:  Drinking to much liquid at a time.  What patient does well:  Avoiding use of a straw.  Previous Successes:  Areas in need of improvement:  Barriers to change:  Accomodating PPE use at work.  Reasons for change:  To tolerate liquids after surgery.  Plan/goal for next 4 weeks:  Use a barrier on glass so easier to change out barrier and use same glass between clients.  Continue to work on decreasing amount of water ingested and avoiding a straw.  Need to set up appointment with pharmacist, Lauren Bloch, Krista Kerlinske,RD and Zuri Dodd PA-C to continue the  multidisciplinary approach to weight loss.      Interventions:  __x___Motivational Interviewing, MI Intervention: Expressed empathy/understanding, supported autonomy, collaboration, evocation, permission to raise concerns or advise, open-ended questions. Reflections: simple and complex, Change talk (evoked), and reframe.  __x___Change Talk expressed by the Patient: Desire to change, ability to change, reasons to change, commitment to change, activation, taking steps.  __x___Provider Response to Change Talk: E- Evoked more info from patient about behavior change, A-Affirmed patient s thoughts, decisions, or attempts at behavior change. R- Reflected patient s change talk and S - Summarized patient s change talk statements.

## 2021-01-07 ENCOUNTER — MEDICAL CORRESPONDENCE (OUTPATIENT)
Dept: HEALTH INFORMATION MANAGEMENT | Facility: CLINIC | Age: 41
End: 2021-01-07

## 2021-01-18 ENCOUNTER — VIRTUAL VISIT (OUTPATIENT)
Dept: NEUROPSYCHOLOGY | Facility: CLINIC | Age: 41
End: 2021-01-18
Payer: COMMERCIAL

## 2021-01-18 DIAGNOSIS — E66.01 MORBID OBESITY (H): ICD-10-CM

## 2021-01-18 DIAGNOSIS — F54 PSYCHOLOGICAL FACTORS AFFECTING MEDICAL CONDITION: ICD-10-CM

## 2021-01-18 DIAGNOSIS — Z01.818 PREPROCEDURAL EXAMINATION: Primary | ICD-10-CM

## 2021-01-18 PROCEDURE — 96138 PSYCL/NRPSYC TECH 1ST: CPT | Mod: GT

## 2021-01-18 PROCEDURE — 90791 PSYCH DIAGNOSTIC EVALUATION: CPT | Mod: GT

## 2021-01-18 PROCEDURE — 96130 PSYCL TST EVAL PHYS/QHP 1ST: CPT | Mod: GT

## 2021-01-18 PROCEDURE — 96139 PSYCL/NRPSYC TST TECH EA: CPT | Mod: GT

## 2021-01-18 NOTE — NURSING NOTE
Pt was seen for neuropsychological evaluation at the request of Dr. Davi Dee for the purposes of diagnostic clarification and treatment planning. 67 minutes of video test administration and scoring were provided by this writer. Please see Dr. Josselin Zhang's report for a full interpretation of the findings.    Video Start: 9:18  Video Stop: 10:02    Rahul Zuleta  Psychometrist
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
## 2021-01-20 ENCOUNTER — MEDICAL CORRESPONDENCE (OUTPATIENT)
Dept: HEALTH INFORMATION MANAGEMENT | Facility: CLINIC | Age: 41
End: 2021-01-20

## 2021-01-20 ENCOUNTER — MYC MEDICAL ADVICE (OUTPATIENT)
Dept: PHARMACY | Facility: CLINIC | Age: 41
End: 2021-01-20

## 2021-01-20 ENCOUNTER — VIRTUAL VISIT (OUTPATIENT)
Dept: ENDOCRINOLOGY | Facility: CLINIC | Age: 41
End: 2021-01-20
Payer: COMMERCIAL

## 2021-01-20 ENCOUNTER — VIRTUAL VISIT (OUTPATIENT)
Dept: PHARMACY | Facility: CLINIC | Age: 41
End: 2021-01-20

## 2021-01-20 DIAGNOSIS — R09.89 RUNNY NOSE: ICD-10-CM

## 2021-01-20 DIAGNOSIS — Z71.3 NUTRITIONAL COUNSELING: ICD-10-CM

## 2021-01-20 DIAGNOSIS — Z86.718 HISTORY OF DEEP VENOUS THROMBOSIS: ICD-10-CM

## 2021-01-20 DIAGNOSIS — R12 HEARTBURN: ICD-10-CM

## 2021-01-20 DIAGNOSIS — F32.A DEPRESSION, UNSPECIFIED DEPRESSION TYPE: ICD-10-CM

## 2021-01-20 DIAGNOSIS — B00.1 COLD SORE: ICD-10-CM

## 2021-01-20 DIAGNOSIS — Z78.9 TAKES DIETARY SUPPLEMENTS: ICD-10-CM

## 2021-01-20 DIAGNOSIS — F41.9 ANXIETY: ICD-10-CM

## 2021-01-20 DIAGNOSIS — E28.2 PCOS (POLYCYSTIC OVARIAN SYNDROME): ICD-10-CM

## 2021-01-20 DIAGNOSIS — E66.812 OBESITY, CLASS II, BMI 35-39.9: Primary | ICD-10-CM

## 2021-01-20 DIAGNOSIS — G47.19 DAYTIME HYPERSOMNOLENCE: ICD-10-CM

## 2021-01-20 DIAGNOSIS — E66.811 CLASS 1 OBESITY DUE TO EXCESS CALORIES WITH BODY MASS INDEX (BMI) OF 31.0 TO 31.9 IN ADULT, UNSPECIFIED WHETHER SERIOUS COMORBIDITY PRESENT: Primary | ICD-10-CM

## 2021-01-20 DIAGNOSIS — E66.09 CLASS 1 OBESITY DUE TO EXCESS CALORIES WITH BODY MASS INDEX (BMI) OF 31.0 TO 31.9 IN ADULT, UNSPECIFIED WHETHER SERIOUS COMORBIDITY PRESENT: Primary | ICD-10-CM

## 2021-01-20 PROCEDURE — 97803 MED NUTRITION INDIV SUBSEQ: CPT | Mod: GT | Performed by: DIETITIAN, REGISTERED

## 2021-01-20 PROCEDURE — 99605 MTMS BY PHARM NP 15 MIN: CPT | Mod: TEL | Performed by: PHARMACIST

## 2021-01-20 PROCEDURE — 99607 MTMS BY PHARM ADDL 15 MIN: CPT | Mod: TEL | Performed by: PHARMACIST

## 2021-01-20 RX ORDER — ASPIRIN 81 MG/1
81 TABLET ORAL EVERY MORNING
COMMUNITY
End: 2024-05-31

## 2021-01-20 RX ORDER — SOLRIAMFETOL 75 MG/1
0.5 TABLET, FILM COATED ORAL EVERY MORNING
COMMUNITY
Start: 2021-01-14 | End: 2024-05-31

## 2021-01-20 RX ORDER — MULTIVIT-MIN/IRON/FOLIC ACID/K 18-600-40
1 CAPSULE ORAL DAILY
COMMUNITY
End: 2021-02-17

## 2021-01-20 NOTE — PROGRESS NOTES
"Aga Alanis is a 40 year old female who is being evaluated via a billable video visit.       The patient has been notified of following:      \"This video visit will be conducted via a call between you and your physician/provider. We have found that certain health care needs can be provided without the need for an in-person physical exam.  This service lets us provide the care you need with a video conversation.  If a prescription is necessary we can send it directly to your pharmacy.  If lab work is needed we can place an order for that and you can then stop by our lab to have the test done at a later time.     Video visits are billed at different rates depending on your insurance coverage.  Please reach out to your insurance provider with any questions.     If during the course of the call the physician/provider feels a video visit is not appropriate, you will not be charged for this service.\"     Patient has given verbal consent for Video visit? Yes   How would you like to obtain your AVS? MyChart  If you are dropped from the video visit, the video invite should be resent to: Text to cell phone: [phone]  Will anyone else be joining your video visit? No        Video-Visit Details     Type of service:  Video Visit     Video Start Time: 9:05 AM   Video End Time: 9:31 AM    Originating Location (pt. Location): Home     Distant Location (provider location):  [facility] [place]      Platform used for Video Visit: YouTern     During this virtual visit the patient is located in [place], patient verifies this as the location during the entirety of this visit.         Return Bariatric Nutrition Consultation Note     Reason For Visit: Nutrition Assessment     Aga Alanis is a 39 year old presenting today for follow-up bariatric nutrition consult.  Pt is interested in laparoscopic sleeve gastrectomy with Dr. Roberts expected surgery in Febuary 2021.  Patient is " "accompanied by self.  This is pt's 4th of 3 required nutrition visits prior to surgery. Pt referred by RICHARD Dumont (2/13/19).   - pt stated the surgery process in 2019. Since then she has changed insurance and now need 3 RD visits instead of 6.         Support System Reviewed With Patient 2/10/2019   Who do you have in your support network that can be available to help you for the first 2 weeks after surgery? yes   Who can you count on for support throughout your weight loss surgery journey? my , both in laws, aunt, children         ANTHROPOMETRICS:  Estimated body mass index is 37.89 kg/m  as calculated from the following:    Height as of 2/13/19: 1.607 m (5' 3.25\").    Weight as of 2/13/19: 97.8 kg (215 lb 9.6 oz).   Current weight: 180 lb ( per pt's report)      Required weight loss goal pre-op: 205 lbs from initial consult weight (goal weight 10 lbs or less before surgery)        2/10/2019   I have tried the following methods to lose weight Watching portions or calories, Exercise, Weight Watchers, Atkins type diet (low carb/high protein), OTC Medications         Weight Loss Questions Reviewed With Patient 2/10/2019   How long have you been overweight? Since late 20's to early 40's         SUPPLEMENT INFORMATION:  Zinc, vitamin C, Vitamin D, liziene and a probiotic.      NUTRITION HISTORY:    Today: 1/20/2021: Pt has not been feeling well for the past two week, she was presumed to have COVIS-19, however she has had 3 negative test result. Her son currently has mono and pt is following up with her PCP for her current signs and symptoms.   Hydration continues to be a struggle, ice cold water is making her stomach upset. She is trying to drink more fluids and non-caffienated tea is sitting well.   Her urine color is dark, on day 5 of antibiotic for a UTI.     Diet Recall:   B: Skips OR eggs or starbucks egg bites OR Just crack and egg   Lunch: brown rice, chicken, vegetables, black beans  (corn chips) " "OR street tacos.   D: [place] a lot of dinner or smoked meat for dinner: pelt grill chicken or pork with rice and green bean, corn. OR GF pizza or CF chicken nuggests         Progress Towards Previous Goals:  Relating To Eating:  Eat slowly (20-30 minutes per meal), chewing foods well (25 chews per bite/applesauce consistency). Stop as soon as you feel satisfied. -Met  9\" Plate method (1/2 plate non-starchy vegetables/fruit, 1/4 plate lean protein, 1/4 plate whole grain starch - no more than 1/2 cup carb/meal)- Met  Eat 3 meals per day-Met        Relating to beverages:  Reduce caffeine/carbonation/calorie containing beverages-Met water  Separate fluids from meals by 30 minutes before, during, and after eating-Met   Drinking a small sip of water every 15 minutes. -Improving, going better will still take big gulps from time to time.      Relating to dietary supplements:  Start a multivitamin containing iron daily- No Change, taking other supplements at this time      Relating to activity:  Increase activity as able-Continues       ADDITIONAL INFORMATION:  Pt prefers to eat organic foods, and she has a daughter that is lactose intolerant.      In the past she has lost weight from intermittent fasting from 12-18 fasts.        NUTRITION DIAGNOSIS:  Overweight r/t long history of self-monitoring deficit and excessive energy intake aeb BMI >25 kg/m2.-improving      INTERVENTION:  Intervention Provided/Education:  1. Reviewed and modified goals  2. Reviewed post-op diet guidelines, ways to help prepare for post-op diet guidelines pre-operatively, portion/calorie-control, mindful eating, sources of protein.   3. Praised patient for diet and lifestyle changes and weight loss.   4. Dicussed gluten free food options.   5. AVS via [employer]        Questions Reviewed With Patient 2/10/2019   How ready are you to make changes regarding your weight? Number 1 = Not ready at all to make changes up to 10 = very ready. 10   How confident " "are you that you can change? 1 = Not confident that you will be successful making changes up to 10 = very confident. 10         Patient Understanding: good  Expected Compliance: good     GOALS:  Relating To Eating:  Eat slowly (20-30 minutes per meal), chewing foods well (25 chews per bite/applesauce consistency). Stop as soon as you feel satisfied.   9\" Plate method (1/2 plate non-starchy vegetables/fruit, 1/4 plate lean protein, 1/4 plate whole grain starch - no more than 1/2 cup carb/meal)  Eat 3 meals per day        Relating to beverages:  Reduce caffeine/carbonation/calorie containing beverages  Separate fluids from meals by 30 minutes before, during, and after eating  Drinking a small sip of water every 15 minutes.      Relating to dietary supplements:  Start a multivitamin containing iron daily     Relating to activity:  Increase activity as able        *Protein Shake Criteria: no more than 250 Calories, at least 20 grams of protein, and less than 10 grams of sugar.   - Try Genpro unflavored for a protein powder it is wheat free. For a protein shake try 1/2 cup of fruit, almond milk and a scoop of protein powder.   - Can also try the [employer] Corepower.     Live-G free brand at [facility]'s         Follow-Up:   Recommend 1 month  follow up visits to assist with lifestyle changes or per insurance.     Time spent with patient: 26 minutes.  Leila Garces, MS, RD, LD    "

## 2021-01-20 NOTE — TELEPHONE ENCOUNTER
Called patient back and changed appt to 11:30 am today.     Lauren Bloch, PharmD  Medication Therapy Management Pharmacist   [facility] [place]

## 2021-01-20 NOTE — PROGRESS NOTES
Medication Therapy Management (MTM) Encounter    ASSESSMENT:                            Medication Adherence/Access: No issues identified    Obesity: Weight loss progressing from nutrition/behavioral changes w/o weight loss medications.  Would benefit from continuing without weight loss medications at this time.     PCOS: Needs improvement. Can consider taking metformin with food consistently to see if relieves GI side effects.     Depression/Anxiety: Stable.     Hx DVT: Needs improvement. Would benefit from switching to EC or regular tablet aspirin, especially for after sleeve gastrectomy.     Heartburn: Needs improvement. Due to infrequent heartburn, could consider using TUMS instead of omeprazole as will be more effective for PRN heartburn.     Runny Nose: Stable.     Supplements: Needs improvement. Would benefit from considering switching to alternative less expensive singular probiotic. Floragen is a good probiotic as multiple strains and strains contained that have shown to assist with improvement of gut bonnie and gut health. Could also consider alternative b12 supplement that is also cheaper. Reasonable to continue b12 as will need post sleeve gastrectomy. Other supplements patient prefers to continue.     Excessive Daytime Tiredness: Stable.     Cold Sores: Stable.     PLAN:                            1. Patient to switch to aspirin enteric coat or regular tablet rather than chewable   2. Patient to use TUMS as needed for heartburn rather than omeprazole. Will work more quickly to relieve your heartburn as occurring infrequently.   3. Can consider cutting down to 1 probiotic daily if not wanting to stop probiotic. Floragen could be an alternative probiotic that is less costly to consider - 1 capsule once daily. It is typically available behind the pharmacy counter in the refrigerator. Does need to be stored in the refrigerator. ~$25-29  4. Alternative Vitamin B12 you could consider getting:     Sublingual  tablet (dissolvable on the tongue): https://www.amazon.com/[employer]-Made-Sublingual-Flavored-Lozenges/dp/G930D3MNX7 - ~$8-10     Liquid (also available at [facility] pharmacy): https://www.Un-Lease.com/[employer]-Bounty-Sublingual-Liquid-Energy/dp/L717UX7WUU     Would only have to take this once weekly due to strength or could take a smaller amount once daily     ~$8-10  5.Patient to get pre bariatric labs completed.   6. Patient to try to take metformin with food to see if improves GI issues.     Follow-up:  6 weeks from now (around time of surgery)     SUBJECTIVE/OBJECTIVE:                          Aga Alanis is a 40 year old female called for an initial visit. She was referred to me from Zuri Dodd PA-C. Private Pay.     Reason for visit: Would like to discuss plan for medications after sleeve gastrectomy. She also wants to ensure her regimen is safe. Would like to discuss patch vitamins.     Allergies/ADRs: Reviewed in chart  Tobacco: She reports that she has never smoked. She has never used smokeless tobacco.  Alcohol: not currently using  Caffeine: 1 cups/day of coffee  Past Medical History: Reviewed in chart.     Medication Adherence/Access:   Patient takes medications directly from bottles.    Obesity:   Phentermine 8 mg once daily - hasn't been taking recently     Followed by Zuri Dodd PA-C , seen 12/21/20 for New Bariatric Consult. She reports wanting to undergo bariatric surgery because of her PCOS. She is aware that she currently has had success with her weight loss but would like something that would be longer term. She was switched to lower phentermine dose due to issues of her phentermine doses. She was still having some irritability with the lower dose so hasn't been using.  She reports that when she was on phentermine 15 mg daily she had a lot of issues with irritability. Has been weighing self every day and being more cognizant of what she is eating. Has a wheat  "allergy. Did not have time to discuss current activity level. She reports that she does have issues with tolerating multivitamins, unsure if historically if it has iron in it. Does have history of iron infusions. She agrees to holding off on utilizing weight loss medication as of now due to her current success through maintaining weight loss. She has been working with dietitian to solidify behavioral modification and improving nutrition.   Failed medications include: Phentermine: effective but irritability. Topiramate: irritability     Current weight today: 178 lb   Wt Readings from Last 4 Encounters:   12/21/20 181 lb (82.1 kg)   11/19/20 181 lb (82.1 kg)   02/13/19 215 lb 9.6 oz (97.8 kg)   12/29/18 196 lb (88.9 kg)     Estimated body mass index is 31.07 kg/m  as calculated from the following:    Height as of 12/21/20: 5' 4\" (1.626 m).    Weight as of 12/21/20: 181 lb (82.1 kg).    PCOS:   Metformin  mg BID    Spironolactone 100 mg daily     She was prescribed metformin at 2000 mg daily, but can't tolerate that higher dose, GI effects - diarrhea. Does admit that sometimes she does forget to take with food and those GI issues are worse at that time. Has 5 children. Had hysterectomy. Uses spironolactone for acne. No hirsutism per patient report.     Depression/Anxiety:   Viibryd 40 mg daily   Buspirone 15 mg BID   Alprazolam 0.25 mg PRN anxiety   L-methylfolate 5 mg daily   Vitamin D3 2,000 international unit(s) daily     Has a folic acid metabolism issue, so started taking L methylfolate. She does notice that when she was out of her l methylfolate her anxiety/depression was worse. This is when buspirone was started. She can't remember the last time she took alprazolam. She feels the buspirone has been helping a lot, back to a level headed standpoint. Taking vitamin D and finds helpful for mood. Had GeneSight testing completed. She was happy she had this completed as explains why so many different medications " did not work for her in the past. Feels that her regimen is the best regimen she has been on.     Hx DVT:   Aspirin 81 mg cheweable daily     She has DVT issues historically due to IV issues. After hysterectomy did have a clot. She has been told she will need to be on aspirin life long, but just recently started. Using chewable but doesn't know which kind she should be using. She was told that she will need Lovenox for 14 days post op sleeve gastrectomy. She does notice that she is bruising a little more easily, but nothing largely concerning.     Heartburn:   Omeprazole 20 mg PRN  TUMS PRN    She has heartburn about 1 time per week if needed. Has tums but doesn't use. She has been using omeprazole PRN and unsure if very helpful at time of heartburn.      Runny Nose:   Cetirizine 10 mg daily, not using currently      In the cold it is worse. She had been taking cetirizine but ran out a couple days ago and not a problem as of yet. So she doesn't feel she will restart unless returns as a problem.     Supplements:   Biotin 10,000 mcg daily   Probiotic #1 1 capsule daily (Perfect Biotic from [employer]) - $69   Probiotic #2 1 capsule daily -$40   B12 liquid 1000 mcg (1 mL) once daily - $28   Zinc 7.5 mg liquid daily - 115 mL for $30   Azo cranberry PRN     She finds biotin helpful for hair and nails. She does notice when she doesn't take. Takes probiotic due to bonnie issues historically. Hysterectomy occurred due to short cycle, frequent periods and would get multiple yeast infection or BV in month due to this. Has been taking two probiotics ever since. She is scared to go off both probiotics but does report no issues with yeast infections or BV since hysterectomy. Reports probiotics are costly. She is unsure of which probiotic is best. Using azo cranberry for UTI prevention, doesn't always take and hasn't been issue as of late. Started zinc due to COVID 19. Also expensive. She prefers to continue this.      Excessive Daytime Tiredness:   Solriamefetol 75 mg tablet: 1/2 tablet once daily     She does find that it is helpful during the day. [facility].  Also helped to decrease appetite. Whole tablet makes her jittery, so only takes 1/2 tablet.     Cold Sores:   Valacyclovir 2 g BID for 3 days   Lysine 600 mg daily     Finds Lysine helpful for prevention and valacyclovir effective for PRN use. No concerns.   ----------------    I spent 30 minutes with this patient today. I offer these suggestions for consideration by patient. A copy of the visit note was provided to the patient's referring provider.    The patient was sent via FreeATM a summary of these recommendations.     Lauren Bloch, PharmD  Medication Therapy Management Pharmacist   [facility]    Telemedicine Visit Details  Type of service:  Telephone visit  Start Time: 11:30 AM  End Time: 12:00 PM  Originating Location (patient location): Home  Distant Location (provider location):  [facility] [place]      Medication Therapy Recommendations  Class 1 obesity due to excess calories with body mass index (BMI) of 31.0 to 31.9 in adult, unspecified whether serious comorbidity present    Rationale: Medication requires monitoring - Needs additional monitoring   Recommendation: Order Lab - Get labs done that have already been ordered   Status: Patient Agreed - Adherence/Education         Heartburn    Current Medication: omeprazole (PRILOSEC) 20 MG CR capsule   Rationale: More effective medication available - Ineffective medication - Effectiveness   Recommendation: Change Medication - Tums 500 MG Chew - chew 1-2 tablets as needed for infrequent heartburn   Status: Accepted - no CPA Needed         History of deep venous thrombosis    Rationale: Dosage form inappropriate - Ineffective medication - Effectiveness   Recommendation: Change Medication Formulation  - aspirin 81 MG EC tablet - 1 tablet daily    Status: Accepted - no CPA Needed         PCOS (polycystic ovarian syndrome)    Current Medication: metFORMIN (GLUCOPHAGE-XR) 500 MG 24 hr tablet   Rationale: Undesirable effect - Adverse medication event - Safety   Recommendation: Provide Education - Take with food   Status: Patient Agreed - Adherence/Education         Takes dietary supplements    Rationale: More cost-effective medication available - Cost - Adherence   Recommendation: Change Medication - PROBIOTIC-10 PO - Floragen - 1 capsule daily   Status: Accepted - no CPA Needed   Note: Stop other 2 probiotics.          Rationale: More cost-effective medication available - Cost - Adherence   Recommendation: Change Medication - B12 LIQUID HEALTH BOOSTER PO - 1000 mcg daily   Status: Accepted - no CPA Needed   Note: Stop other b12

## 2021-01-20 NOTE — Clinical Note
Zuri patient - sending as FYI for you Trinh. She is pre-surgery. Planning to have surgery in 6 weeks.

## 2021-01-20 NOTE — LETTER
"1/20/2021       RE: Aga Alanis  [address] [place] [facility] [place] [place] [address]     Dear Colleague,    Thank you for referring your patient, Aga Alanis, to the [facility] [place] at [facility]. Please see a copy of my visit note below.    Aga Alanis is a 40 year old female who is being evaluated via a billable video visit.       The patient has been notified of following:      \"This video visit will be conducted via a call between you and your physician/provider. We have found that certain health care needs can be provided without the need for an in-person physical exam.  This service lets us provide the care you need with a video conversation.  If a prescription is necessary we can send it directly to your pharmacy.  If lab work is needed we can place an order for that and you can then stop by our lab to have the test done at a later time.     Video visits are billed at different rates depending on your insurance coverage.  Please reach out to your insurance provider with any questions.     If during the course of the call the physician/provider feels a video visit is not appropriate, you will not be charged for this service.\"     Patient has given verbal consent for Video visit? Yes   How would you like to obtain your AVS? MyChart  If you are dropped from the video visit, the video invite should be resent to: Text to cell phone: [phone]  Will anyone else be joining your video visit? No        Video-Visit Details     Type of service:  Video Visit     Video Start Time: 9:05 AM   Video End Time: 9:31 AM    Originating Location (pt. Location): Home     Distant Location (provider location):  [facility]      Platform used for Video Visit: GameMaki     During this virtual visit the patient is located in [place], patient verifies this as the location during the entirety of " "this visit.         Return Bariatric Nutrition Consultation Note     Reason For Visit: Nutrition Assessment     Aga Alanis is a 39 year old presenting today for follow-up bariatric nutrition consult.  Pt is interested in laparoscopic sleeve gastrectomy with Dr. Roberts expected surgery in Febuary 2021.  Patient is accompanied by self.  This is pt's 4th of 3 required nutrition visits prior to surgery. Pt referred by RICHARD Dumont (2/13/19).   - pt stated the surgery process in 2019. Since then she has changed insurance and now need 3 RD visits instead of 6.         Support System Reviewed With Patient 2/10/2019   Who do you have in your support network that can be available to help you for the first 2 weeks after surgery? yes   Who can you count on for support throughout your weight loss surgery journey? my , both in laws, aunt, children         ANTHROPOMETRICS:  Estimated body mass index is 37.89 kg/m  as calculated from the following:    Height as of 2/13/19: 1.607 m (5' 3.25\").    Weight as of 2/13/19: 97.8 kg (215 lb 9.6 oz).   Current weight: 180 lb ( per pt's report)      Required weight loss goal pre-op: 205 lbs from initial consult weight (goal weight 10 lbs or less before surgery)        2/10/2019   I have tried the following methods to lose weight Watching portions or calories, Exercise, Weight Watchers, Atkins type diet (low carb/high protein), OTC Medications         Weight Loss Questions Reviewed With Patient 2/10/2019   How long have you been overweight? Since late 20's to early 40's         SUPPLEMENT INFORMATION:  Zinc, vitamin C, Vitamin D, liziene and a probiotic.      NUTRITION HISTORY:    Today: 1/20/2021: Pt has not been feeling well for the past two week, she was presumed to have COVIS-19, however she has had 3 negative test result. Her son currently has mono and pt is following up with her PCP for her current signs and symptoms.   Hydration continues to be a struggle, " "ice cold water is making her stomach upset. She is trying to drink more fluids and non-caffienated tea is sitting well.   Her urine color is dark, on day 5 of antibiotic for a UTI.     Diet Recall:   B: Skips OR eggs or starbucks egg bites OR Just crack and egg   Lunch: brown rice, chicken, vegetables, black beans  (corn chips) OR street tacos.   D: Lostant a lot of dinner or smoked meat for dinner: pelt grill chicken or pork with rice and green bean, corn. OR GF pizza or CF chicken nuggests         Progress Towards Previous Goals:  Relating To Eating:  Eat slowly (20-30 minutes per meal), chewing foods well (25 chews per bite/applesauce consistency). Stop as soon as you feel satisfied. -Met  9\" Plate method (1/2 plate non-starchy vegetables/fruit, 1/4 plate lean protein, 1/4 plate whole grain starch - no more than 1/2 cup carb/meal)- Met  Eat 3 meals per day-Met        Relating to beverages:  Reduce caffeine/carbonation/calorie containing beverages-Met water  Separate fluids from meals by 30 minutes before, during, and after eating-Met   Drinking a small sip of water every 15 minutes. -Improving, going better will still take big gulps from time to time.      Relating to dietary supplements:  Start a multivitamin containing iron daily- No Change, taking other supplements at this time      Relating to activity:  Increase activity as able-Continues       ADDITIONAL INFORMATION:  Pt prefers to eat organic foods, and she has a daughter that is lactose intolerant.      In the past she has lost weight from intermittent fasting from 12-18 fasts.        NUTRITION DIAGNOSIS:  Overweight r/t long history of self-monitoring deficit and excessive energy intake aeb BMI >25 kg/m2.-improving      INTERVENTION:  Intervention Provided/Education:  1. Reviewed and modified goals  2. Reviewed post-op diet guidelines, ways to help prepare for post-op diet guidelines pre-operatively, portion/calorie-control, mindful eating, sources of " "protein.   3. Praised patient for diet and lifestyle changes and weight loss.   4. Dicussed gluten free food options.   5. AVS via GÃ¼dpod        Questions Reviewed With Patient 2/10/2019   How ready are you to make changes regarding your weight? Number 1 = Not ready at all to make changes up to 10 = very ready. 10   How confident are you that you can change? 1 = Not confident that you will be successful making changes up to 10 = very confident. 10         Patient Understanding: good  Expected Compliance: good     GOALS:  Relating To Eating:  Eat slowly (20-30 minutes per meal), chewing foods well (25 chews per bite/applesauce consistency). Stop as soon as you feel satisfied.   9\" Plate method (1/2 plate non-starchy vegetables/fruit, 1/4 plate lean protein, 1/4 plate whole grain starch - no more than 1/2 cup carb/meal)  Eat 3 meals per day        Relating to beverages:  Reduce caffeine/carbonation/calorie containing beverages  Separate fluids from meals by 30 minutes before, during, and after eating  Drinking a small sip of water every 15 minutes.      Relating to dietary supplements:  Start a multivitamin containing iron daily     Relating to activity:  Increase activity as able        *Protein Shake Criteria: no more than 250 Calories, at least 20 grams of protein, and less than 10 grams of sugar.   - Try Genpro unflavored for a protein powder it is wheat free. For a protein shake try 1/2 cup of fruit, almond milk and a scoop of protein powder.   - Can also try the [employer] Corepower.     Live-G free brand at [facility]'s         Follow-Up:   Recommend 1 month  follow up visits to assist with lifestyle changes or per insurance.     Time spent with patient: 26 minutes.  Leila Garces, MS, RD, LD        "

## 2021-01-21 ENCOUNTER — PREP FOR PROCEDURE (OUTPATIENT)
Dept: ENDOCRINOLOGY | Facility: CLINIC | Age: 41
End: 2021-01-21

## 2021-01-21 DIAGNOSIS — E66.01 MORBID OBESITY (H): Primary | ICD-10-CM

## 2021-01-21 RX ORDER — CEFAZOLIN SODIUM 2 G/50ML
2 SOLUTION INTRAVENOUS
Status: CANCELLED | OUTPATIENT
Start: 2021-01-21

## 2021-01-21 RX ORDER — CEFAZOLIN SODIUM 1 G/50ML
1 INJECTION, SOLUTION INTRAVENOUS SEE ADMIN INSTRUCTIONS
Status: CANCELLED | OUTPATIENT
Start: 2021-01-21

## 2021-02-01 ENCOUNTER — PREP FOR PROCEDURE (OUTPATIENT)
Dept: ENDOCRINOLOGY | Facility: CLINIC | Age: 41
End: 2021-02-01

## 2021-02-01 DIAGNOSIS — E66.9 OBESITY: Primary | ICD-10-CM

## 2021-02-01 RX ORDER — CEFAZOLIN SODIUM 1 G/50ML
1 INJECTION, SOLUTION INTRAVENOUS SEE ADMIN INSTRUCTIONS
Status: CANCELLED | OUTPATIENT
Start: 2021-02-01

## 2021-02-01 RX ORDER — CEFAZOLIN SODIUM 2 G/50ML
2 SOLUTION INTRAVENOUS
Status: CANCELLED | OUTPATIENT
Start: 2021-02-01

## 2021-02-04 ENCOUNTER — PREP FOR PROCEDURE (OUTPATIENT)
Dept: ENDOCRINOLOGY | Facility: CLINIC | Age: 41
End: 2021-02-04

## 2021-02-04 DIAGNOSIS — E66.9 OBESITY: Primary | ICD-10-CM

## 2021-02-04 RX ORDER — CEFAZOLIN SODIUM 1 G/50ML
1 INJECTION, SOLUTION INTRAVENOUS SEE ADMIN INSTRUCTIONS
Status: CANCELLED | OUTPATIENT
Start: 2021-02-04

## 2021-02-04 RX ORDER — CEFAZOLIN SODIUM 2 G/50ML
2 SOLUTION INTRAVENOUS
Status: CANCELLED | OUTPATIENT
Start: 2021-02-04

## 2021-02-05 ENCOUNTER — TELEPHONE (OUTPATIENT)
Dept: ENDOCRINOLOGY | Facility: CLINIC | Age: 41
End: 2021-02-05

## 2021-02-05 NOTE — TELEPHONE ENCOUNTER
Called patient to see if she would like to participate in our adipose tissue study. Patient states she would like to participate. I did tell her Meghan Guy will call her to give her more details.   
Family

## 2021-02-05 NOTE — TELEPHONE ENCOUNTER
Patient called wanting to know if I have received all the letters needed prior to submitting to the ins co for prior authorization. I have not received a letter from Dr. Padmini Delcid with [employer], who manages patient's medical marijuana usage. I left a VM message with Dr. Delcid at [phone] to write a letter regarding proceeding with a sleeve and left my phone and fax number.

## 2021-02-08 ENCOUNTER — TELEPHONE (OUTPATIENT)
Dept: ENDOCRINOLOGY | Facility: CLINIC | Age: 41
End: 2021-02-08

## 2021-02-08 DIAGNOSIS — Z01.818 PREOP TESTING: Primary | ICD-10-CM

## 2021-02-08 NOTE — TELEPHONE ENCOUNTER
Left 2nd  for [facility]'s staff msg:    Hi Agents,     Please call Aga Alanis to schedule her postop appointments for 1 week and 31+ days.       Her  sleeve gastrectomy with Dr Dee is scheduled for  3/9/2021.     Please also ask to move her visit with Lauren Bloch to 3/2/2021 and reschedule that PharmD visit.

## 2021-02-15 NOTE — TELEPHONE ENCOUNTER
FUTURE VISIT INFORMATION      SURGERY INFORMATION:    Date: 3.9.21    Location:  OR    Surgeon:  Dr. Dee    Anesthesia Type:  General    Procedure: GASTRECTOMY, SLEEVE, LAPAROSCOPIC    Consult:  21    RECORDS REQUESTED FROM:       Primary Care Provider: Dr. Abbott    Most recent EKG+ Tracin.2.17    :

## 2021-02-17 ENCOUNTER — PRE VISIT (OUTPATIENT)
Dept: SURGERY | Facility: CLINIC | Age: 41
End: 2021-02-17

## 2021-02-17 ENCOUNTER — ANESTHESIA EVENT (OUTPATIENT)
Dept: SURGERY | Facility: CLINIC | Age: 41
End: 2021-02-17

## 2021-02-17 ENCOUNTER — OFFICE VISIT (OUTPATIENT)
Dept: SURGERY | Facility: CLINIC | Age: 41
End: 2021-02-17
Payer: COMMERCIAL

## 2021-02-17 VITALS
HEIGHT: 64 IN | RESPIRATION RATE: 16 BRPM | OXYGEN SATURATION: 97 % | HEART RATE: 98 BPM | BODY MASS INDEX: 31.41 KG/M2 | DIASTOLIC BLOOD PRESSURE: 71 MMHG | SYSTOLIC BLOOD PRESSURE: 100 MMHG | WEIGHT: 184 LBS

## 2021-02-17 DIAGNOSIS — Z01.818 PREOP EXAMINATION: ICD-10-CM

## 2021-02-17 DIAGNOSIS — Z01.818 PREOP EXAMINATION: Primary | ICD-10-CM

## 2021-02-17 DIAGNOSIS — E66.9 OBESITY: ICD-10-CM

## 2021-02-17 LAB
ALBUMIN SERPL-MCNC: 3.9 G/DL (ref 3.4–5)
ALBUMIN UR-MCNC: NEGATIVE MG/DL
ALP SERPL-CCNC: 58 U/L (ref 40–150)
ALT SERPL W P-5'-P-CCNC: 72 U/L (ref 0–50)
ANION GAP SERPL CALCULATED.3IONS-SCNC: 4 MMOL/L (ref 3–14)
APPEARANCE UR: CLEAR
AST SERPL W P-5'-P-CCNC: 35 U/L (ref 0–45)
BILIRUB SERPL-MCNC: 0.4 MG/DL (ref 0.2–1.3)
BILIRUB UR QL STRIP: NEGATIVE
BUN SERPL-MCNC: 11 MG/DL (ref 7–30)
CALCIUM SERPL-MCNC: 9.6 MG/DL (ref 8.5–10.1)
CHLORIDE SERPL-SCNC: 105 MMOL/L (ref 94–109)
CHOLEST SERPL-MCNC: 213 MG/DL
CO2 SERPL-SCNC: 29 MMOL/L (ref 20–32)
COLOR UR AUTO: YELLOW
CREAT SERPL-MCNC: 0.82 MG/DL (ref 0.52–1.04)
ERYTHROCYTE [DISTWIDTH] IN BLOOD BY AUTOMATED COUNT: 12 % (ref 10–15)
GFR SERPL CREATININE-BSD FRML MDRD: 87 ML/MIN/{1.73_M2}
GLUCOSE SERPL-MCNC: 87 MG/DL (ref 70–99)
GLUCOSE UR STRIP-MCNC: NEGATIVE MG/DL
HBA1C MFR BLD: 5.2 % (ref 0–5.6)
HCT VFR BLD AUTO: 42.2 % (ref 35–47)
HDLC SERPL-MCNC: 65 MG/DL
HGB BLD-MCNC: 14 G/DL (ref 11.7–15.7)
HGB UR QL STRIP: NEGATIVE
KETONES UR STRIP-MCNC: NEGATIVE MG/DL
LDLC SERPL CALC-MCNC: 139 MG/DL
LEUKOCYTE ESTERASE UR QL STRIP: NEGATIVE
MCH RBC QN AUTO: 30.1 PG (ref 26.5–33)
MCHC RBC AUTO-ENTMCNC: 33.2 G/DL (ref 31.5–36.5)
MCV RBC AUTO: 91 FL (ref 78–100)
NITRATE UR QL: NEGATIVE
NONHDLC SERPL-MCNC: 148 MG/DL
PH UR STRIP: 6 PH (ref 5–7)
PLATELET # BLD AUTO: 291 10E9/L (ref 150–450)
POTASSIUM SERPL-SCNC: 4.2 MMOL/L (ref 3.4–5.3)
PROT SERPL-MCNC: 8 G/DL (ref 6.8–8.8)
PTH-INTACT SERPL-MCNC: 30 PG/ML (ref 18–80)
RBC # BLD AUTO: 4.65 10E12/L (ref 3.8–5.2)
SODIUM SERPL-SCNC: 138 MMOL/L (ref 133–144)
SOURCE: NORMAL
SP GR UR STRIP: 1.02 (ref 1–1.03)
TRIGL SERPL-MCNC: 45 MG/DL
UROBILINOGEN UR STRIP-MCNC: 0 MG/DL (ref 0–2)
WBC # BLD AUTO: 4.8 10E9/L (ref 4–11)

## 2021-02-17 PROCEDURE — 80061 LIPID PANEL: CPT | Performed by: PATHOLOGY

## 2021-02-17 PROCEDURE — 80053 COMPREHEN METABOLIC PANEL: CPT | Performed by: PATHOLOGY

## 2021-02-17 PROCEDURE — 82306 VITAMIN D 25 HYDROXY: CPT | Performed by: PATHOLOGY

## 2021-02-17 PROCEDURE — 85027 COMPLETE CBC AUTOMATED: CPT | Performed by: PATHOLOGY

## 2021-02-17 PROCEDURE — 83970 ASSAY OF PARATHORMONE: CPT | Performed by: PATHOLOGY

## 2021-02-17 PROCEDURE — 99203 OFFICE O/P NEW LOW 30 MIN: CPT | Performed by: PHYSICIAN ASSISTANT

## 2021-02-17 PROCEDURE — 36415 COLL VENOUS BLD VENIPUNCTURE: CPT | Performed by: PATHOLOGY

## 2021-02-17 PROCEDURE — 83036 HEMOGLOBIN GLYCOSYLATED A1C: CPT | Performed by: PATHOLOGY

## 2021-02-17 PROCEDURE — 93000 ELECTROCARDIOGRAM COMPLETE: CPT | Performed by: INTERNAL MEDICINE

## 2021-02-17 PROCEDURE — 81003 URINALYSIS AUTO W/O SCOPE: CPT | Performed by: PATHOLOGY

## 2021-02-17 ASSESSMENT — LIFESTYLE VARIABLES: TOBACCO_USE: 0

## 2021-02-17 ASSESSMENT — MIFFLIN-ST. JEOR: SCORE: 1474.62

## 2021-02-17 ASSESSMENT — PAIN SCALES - GENERAL: PAINLEVEL: NO PAIN (0)

## 2021-02-17 NOTE — PATIENT INSTRUCTIONS
Preparing for Your Surgery      Name:  Aga Alanis   MRN:  [id number]   :  1980   Today's Date:  2021       Arriving for surgery:  Surgery date:  3/9/21  Arrival time:  10:30 am    Restrictions due to COVID 19:  One consistent visitor per patient is allowed  No ill visitors  All visitors must wear face mask     parking is available for anyone with mobility limitations or disabilities.  ([place]  24 hours/ 7 days a week; [facility]  7 am- 3:30 pm, Mon- Fri)    Please come to:   [facility] [address]  [address] [place] [address]  -    Please proceed to [address] on the 3rd floor. [phone]?     - ?If you are in need of directions, wheelchair or escort please stop at the Information Desk in the lobby.  Inform the information person that you are here for surgery; a wheelchair and escort to [address] will be provided.?     What can I eat or drink?  -  You may eat per Surgery Packet Bowel Prep Instructions  -  You may have clear liquids until 2 hours before surgery.     Examples of clear liquids:  Water  Clear broth  Juices (apple, white grape, white cranberry  and cider) without pulp  Noncarbonated, powder based beverages  (lemonade and Femi-Aid)  Sodas (Sprite, 7-Up, ginger ale and seltzer)  Coffee or tea (without milk or cream)  Gatorade    -  No Alcohol for at least 24 hours before surgery     Which medicines can I take?    Hold Aspirin for 7 days before surgery.   Hold Multivitamins for 7 days before surgery.  Hold Supplements for 7 days before surgery.  Hold Ibuprofen (Advil, Motrin) for 1 day before surgery--unless otherwise directed by surgeon.  Hold Naproxen (Aleve) for 4 days before surgery.    -  DO NOT take these medications the day of surgery:  Spironolactone (aldactone) + metformin if both are normally taken in the morning.  -  PLEASE TAKE these medications the day of surgery:  Tylenol if needed; take all  other scheduled morning medications.    How do I prepare myself?  - Please take 2 showers before surgery using Scrubcare or Hibiclens soap.    Use this soap only from the neck to your toes.     Leave the soap on your skin for one minute--then rinse thoroughly.      You may use your own shampoo and conditioner; no other hair products.   - Please remove all jewelry and body piercings.  - No lotions, deodorants or fragrance.  - No makeup or fingernail polish.   - Bring your ID and insurance card.    - All patients are required to have a Covid-19 test within 4 days of surgery/procedure.      -Patients will be contacted by the [facility] scheduling team within 1 week of surgery to make an appointment.      - Patients may call the Scheduling team at [phone] if they have not been scheduled within 4 days of  surgery.    Questions or Concerns:    - For any questions regarding the day of surgery or your hospital stay, please contact the Pre Admission Nursing Office at [phone].       - If you have health changes between today and your surgery please call your surgeon.       For questions after surgery please call your surgeons office.

## 2021-02-17 NOTE — ANESTHESIA PREPROCEDURE EVALUATION
Anesthesia Pre-Procedure Evaluation    Patient: Aga Alanis   MRN: [id number] : 1980        Preoperative Diagnosis: * No surgery found *   Procedure :      Past Medical History:   Diagnosis Date     Depression      DVT (deep venous thrombosis) (H)      Gastroesophageal reflux disease with esophagitis      Ovarian cyst       Past Surgical History:   Procedure Laterality Date     COSMETIC SURGERY      breast augmentation and tummy       ENT SURGERY      tubes      EXAM UNDER ANESTHESIA PELVIC N/A 2018    Procedure: Vaginal exam under anesthesia, repair of vaginal cuff;  Surgeon: Bev Fischer MD;  Location: [facility] OR     GYN SURGERY      ovarian cyst , tubes tied      HYSTERECTOMY       ORTHOPEDIC SURGERY      knee right      REPAIR LACERATION VAGINA N/A 2018    Procedure: Repair of vaginal laceration;  Surgeon: Bev Fischer MD;  Location: [facility] OR      Allergies   Allergen Reactions     Sulfa Drugs Anaphylaxis     Antibiotic, but unsure which one     Codeine Sulfate Nausea and Vomiting     Demerol Nausea and Vomiting     Wheat Bran Other (See Comments)     Food allergy tested and confirmed by [facility]     Flagyl [Metronidazole] Rash      Social History     Tobacco Use     Smoking status: Never Smoker     Smokeless tobacco: Never Used   Substance Use Topics     Alcohol use: Yes      Wt Readings from Last 1 Encounters:   20 82.1 kg (181 lb)        Anesthesia Evaluation   Pt has had prior anesthetic.     History of anesthetic complications  - PONV.      ROS/MED HX  ENT/Pulmonary:     (+) sleep apnea (since recent weight loss, not using CPAP), doesn't use CPAP,  (-) tobacco use and asthma   Neurologic:  - neg neurologic ROS     Cardiovascular:  - neg cardiovascular ROS     METS/Exercise Tolerance: >4 METS    Hematologic: Comments: Heterozygous Factor V Leiden mutation      Musculoskeletal: Comment: Recent tailbone pain      GI/Hepatic:     (+) GERD,     Renal/Genitourinary:  -  neg Renal ROS     Endo: Comment: PCOS    (+) Obesity,  (-) Type I DM, Type II DM and thyroid disease   Psychiatric/Substance Use:     (+) psychiatric history depression and anxiety     Infectious Disease:       Malignancy:  - neg malignancy ROS     Other:  - neg other ROS   (-) Any chance pregnant       Physical Exam    Airway  airway exam normal           Respiratory Devices and Support         Dental  no notable dental history         Cardiovascular   cardiovascular exam normal          Pulmonary   pulmonary exam normal                OUTSIDE LABS:  CBC:   Lab Results   Component Value Date    WBC 6.9 02/13/2019    WBC 7.8 12/29/2018    HGB 11.8 02/13/2019    HGB 9.7 (L) 12/30/2018    HCT 38.8 02/13/2019    HCT 39.5 12/29/2018     02/13/2019     12/29/2018     BMP:   Lab Results   Component Value Date     02/13/2019     12/29/2018    POTASSIUM 4.6 02/13/2019    POTASSIUM 3.6 12/29/2018    CHLORIDE 106 02/13/2019    CHLORIDE 105 12/29/2018    CO2 26 02/13/2019    CO2 27 12/29/2018    BUN 13 02/13/2019    BUN 16 12/29/2018    CR 0.58 02/13/2019    CR 0.67 12/29/2018    GLC 86 02/13/2019    GLC 95 12/30/2018     COAGS: No results found for: PTT, INR, FIBR  POC:   Lab Results   Component Value Date    HCG Negative 01/16/2010    HCGS Negative 05/02/2017     HEPATIC:   Lab Results   Component Value Date    ALBUMIN 3.8 02/13/2019    PROTTOTAL 8.3 02/13/2019    ALT 26 02/13/2019    AST 17 02/13/2019    ALKPHOS 88 02/13/2019    BILITOTAL 0.2 02/13/2019     OTHER:   Lab Results   Component Value Date    A1C 5.6 02/13/2019    SUMEET 9.2 02/13/2019             PAC Discussion and Assessment    ASA Classification: 3  Case is suitable for: [place]  Anesthetic techniques and relevant risks discussed: GA  Invasive monitoring and risk discussed: No    Possibility and Risk of blood transfusion discussed: No            PAC Resident/NP Anesthesia Assessment: Aga Alanis is a 41 year old female scheduled  "for laparoscopic sleeve gastrectomy on 3/9/21 by Dr. Dee in treatment of morbid obesity.  PAC referral for risk assessment and optimization for anesthesia with comorbid conditions of h/o DVT 2017, GERD, PCOS, depression:    Pre-operative considerations:  1.  Cardiac:  Functional status- METS >4.  Intermeidate risk surgery with 0.9% (RCRI #) risk of major adverse cardiac event.  Denies cardiac history, denies chest pain, SOB, ROSADO, palpitations, orthopnea, peripheral edema.  --EKG today shows: NSR, low voltage QRS  --will hold ASA 81mg for 7 days prior to DOS.   2.  Pulm:  Airway feasible.  NNEKA risk: Pt has known NNEKA, not using CPAP currently.  Never smoker.  3.  GI:  Risk of PONV score = 3.  If > 2, anti-emetic intervention recommended.  --GERD, continue omeprazole  4.  Psych: h/o depression, continue Xanax, Buspar, vilazodone as directed.  5.  Heme: h/o LUE DVT 2017.  Pt has heterozygous FVL mutation.  Followed by [facility].  Letter from TOMASZ Mas of [facility] dated 12/9/20 states \" I would recommend the patient receive DVT prophylaxis in the form of low molecular weight heparin while she is in the hospital and 2 weeks afterwards given her history of VTE and factor V Leiden mutation.\"  6.  Endo: h/o PCOS, pt will hold Aldactone and metformin DOS.     VTE risk: 1.8%    Patient is optimized and is acceptable candidate for the proposed procedure.  No further diagnostic evaluation is needed.         **For further details of assessment, testing, and physical exam please see H and P completed on same date.          Delmy Black PA-C, [facility]    Reviewed and Signed by PAC Mid-Level Provider/Resident  Mid-Level Provider/Resident: Delmy Black  Date: 2/17/21      Attending Anesthesiologist Anesthesia Assessment: Well appearing patient in usual state of health.    Additional evaluations are not required prior to surgery.  Reviewed and Signed by PAC Anesthesiologist  Anesthesiologist: Oscar  Date: 17 Feb " 2021  Time: 1045  Pass/Fail: Pass                 Delmy Black PA-C

## 2021-02-17 NOTE — H&P
Pre-Operative H & P     CC:  Preoperative exam to assess for increased cardiopulmonary risk while undergoing surgery and anesthesia.    Date of Encounter: 2/17/2021  Primary Care Physician:  Michael Abbott  Associated Diagnosis: obesity    HPI  Aga Alanis is a 41 year old female who presents for pre-operative H & P in preparation for laparoscopic sleeve gastrectomy with Dr. Dee on 3/9/2021 at [facility]. General anesthesia.    This is a 41-year-old female patient with past medical history significant for heterozygous factor V mutation, left upper extremity DVT 2017, PCOS, GERD, depression, anxiety.  Patient has been overweight since her late 20s.  She has tried other methods of losing weight such as watching portions and calories, exercise, weight watchers, Atkins type diet.  She has also tried weight loss medication such as phentermine but this caused irritability.  She has been followed by bariatric service for work-up and is now ready to proceed with surgery.  She has met her weight loss goal presurgery.    History is obtained from the patient and the medical record.     Past Medical History  Past Medical History:   Diagnosis Date     Depression      DVT (deep venous thrombosis) (H)      Gastroesophageal reflux disease with esophagitis      Ovarian cyst        Past Surgical History  Past Surgical History:   Procedure Laterality Date     COSMETIC SURGERY      breast augmentation and tummy tuck 2017     ENT SURGERY      tubes      EXAM UNDER ANESTHESIA PELVIC N/A 12/29/2018    Procedure: Vaginal exam under anesthesia, repair of vaginal cuff;  Surgeon: Bev Fischer MD;  Location: [facility] OR     GYN SURGERY      ovarian cyst , tubes tied      HYSTERECTOMY       ORTHOPEDIC SURGERY      knee right      REPAIR LACERATION VAGINA N/A 12/29/2018    Procedure: Repair of vaginal laceration;  Surgeon: Bev Fischer MD;  Location:  OR       Hx of Blood  transfusions/reactions: denies     Hx of abnormal bleeding or anti-platelet use: ASA 81mg    Menstrual history: Patient's last menstrual period was 06/05/2017.:     Steroid use in the last year: denies    Personal or FH with difficulty with Anesthesia:  PONV    Prior to Admission Medications  Current Outpatient Medications   Medication Sig Dispense Refill     ALPRAZolam (XANAX) 0.25 MG tablet Take 0.25 mg by mouth nightly as needed for anxiety       aspirin 81 MG EC tablet Take 81 mg by mouth every morning        Biotin 10 MG TABS tablet Take 10 mg by mouth every morning        busPIRone (BUSPAR) 15 MG tablet Take 15 mg by mouth 2 times daily       cetirizine (ZYRTEC ALLERGY) 10 MG tablet Take 10 mg by mouth every morning        Cholecalciferol (VITAMIN D3) 50 MCG (2000 UT) TABS Take 2,000 Units by mouth every morning        Cyanocobalamin (B-12 PO) Take 1,000 mcg by mouth every morning        lactobacillus rhamnosus, GG, (CULTURELL) capsule Take 1 capsule by mouth every morning        Levomefolate Glucosamine (METHYLFOLATE PO) Take 5 mg by mouth every morning        metFORMIN (GLUCOPHAGE-XR) 500 MG 24 hr tablet TAKE 4 TABLET BY MOUTH DAILY WITH BREAKFAST       omeprazole (PRILOSEC) 20 MG CR capsule Take 20 mg by mouth as needed        SOLRIAMFETOL 75 MG TABS tablet Take 0.5 tablets by mouth every morning        spironolactone (ALDACTONE) 100 MG tablet Take 100 mg by mouth every morning        valACYclovir (VALTREX) 1000 mg tablet Take 2,000 mg by mouth as needed For 3 days       VIIBRYD 40 MG TABS tablet Take 40 mg by mouth every morning          Allergies  Allergies   Allergen Reactions     Sulfa Drugs Anaphylaxis     Antibiotic, but unsure which one     Codeine Sulfate Nausea and Vomiting     Demerol Nausea and Vomiting     Wheat Bran Other (See Comments)     Food allergy tested and confirmed by [facility]     Flagyl [Metronidazole] Rash       Social History  Social History     Socioeconomic History     Marital  status:      Spouse name: Not on file     Number of children: Not on file     Years of education: Not on file     Highest education level: Not on file   Occupational History     Not on file   Social Needs     Financial resource strain: Not on file     Food insecurity     Worry: Not on file     Inability: Not on file     Transportation needs     Medical: Not on file     Non-medical: Not on file   Tobacco Use     Smoking status: Never Smoker     Smokeless tobacco: Never Used   Substance and Sexual Activity     Alcohol use: Yes     Drug use: No     Sexual activity: Not on file   Lifestyle     Physical activity     Days per week: Not on file     Minutes per session: Not on file     Stress: Not on file   Relationships     Social connections     Talks on phone: Not on file     Gets together: Not on file     Attends [employer] service: Not on file     Active member of club or organization: Not on file     Attends meetings of clubs or organizations: Not on file     Relationship status: Not on file     Intimate partner violence     Fear of current or ex partner: Not on file     Emotionally abused: Not on file     Physically abused: Not on file     Forced sexual activity: Not on file   Other Topics Concern     Not on file   Social History Narrative     Not on file       Family History  Family History   Problem Relation Age of Onset     Alcoholism Mother         drug addict     No Known Problems Father         estranged     Diabetes Maternal Grandmother      Breast Cancer Maternal Aunt            Anesthesia Evaluation   Pt has had prior anesthetic.     History of anesthetic complications  - PONV.      ROS/MED HX  ENT/Pulmonary:     (+) sleep apnea (since recent weight loss, not using CPAP), doesn't use CPAP,  (-) tobacco use and asthma   Neurologic:  - neg neurologic ROS     Cardiovascular:  - neg cardiovascular ROS     METS/Exercise Tolerance: >4 METS    Hematologic: Comments: Heterozygous Factor V Leiden mutation     "  Musculoskeletal: Comment: Recent tailbone pain      GI/Hepatic:     (+) GERD,     Renal/Genitourinary:  - neg Renal ROS     Endo: Comment: PCOS    (+) Obesity,  (-) Type I DM, Type II DM and thyroid disease   Psychiatric/Substance Use:     (+) psychiatric history depression and anxiety     Infectious Disease:       Malignancy:  - neg malignancy ROS     Other:  - neg other ROS   (-) Any chance pregnant         The complete review of systems is negative other than noted in the HPI or here.       BP: 100/71 Pulse: 98   Resp: 16 SpO2: 97 %         184 lbs 0 oz  5' 4\"   Body mass index is 31.58 kg/m .       Physical Exam  Constitutional: Awake, alert, cooperative, no apparent distress, and appears stated age.  Eyes: Pupils equal, round and reactive to light, extra ocular muscles intact, sclera clear, conjunctiva normal.  HENT: Normocephalic, oral pharynx with moist mucus membranes, good dentition. No goiter appreciated.   Respiratory: Clear to auscultation bilaterally, no crackles or wheezing.  Cardiovascular: Regular rate and rhythm, normal S1 and S2, and no murmur noted.  Carotids +2, no bruits. No edema. Palpable pulses to radial  DP and PT arteries.   GI: Normal bowel sounds  Lymph/Hematologic: No cervical lymphadenopathy and no supraclavicular lymphadenopathy.  Genitourinary:  deferred  Skin: Warm and dry.  No rashes at anticipated surgical site.   Musculoskeletal: Full ROM of neck. There is no redness, warmth, or swelling of the joints. Gross motor strength is normal.    Neurologic: Awake, alert, oriented to name, place and time. Cranial nerves II-XII are grossly intact. Gait is normal.   Neuropsychiatric: Calm, cooperative. Normal affect.     PRIOR LABS/DIAGNOSTIC STUDIES:  All labs and imaging personally reviewed    Component      Latest Ref Rng & Units 2/17/2021   WBC      4.0 - 11.0 10e9/L 4.8   RBC Count      3.8 - 5.2 10e12/L 4.65   Hemoglobin      11.7 - 15.7 g/dL 14.0   Hematocrit      35.0 - 47.0 % 42.2 "   MCV      78 - 100 fl 91   MCH      26.5 - 33.0 pg 30.1   MCHC      31.5 - 36.5 g/dL 33.2   RDW      10.0 - 15.0 % 12.0   Platelet Count      150 - 450 10e9/L 291     Component      Latest Ref Rng & Units 2/17/2021   Hemoglobin A1C      0 - 5.6 % 5.2     Component      Latest Ref Rng & Units 2/17/2021   Sodium      133 - 144 mmol/L 138   Potassium      3.4 - 5.3 mmol/L 4.2   Chloride      94 - 109 mmol/L 105   Carbon Dioxide      20 - 32 mmol/L 29   Anion Gap      3 - 14 mmol/L 4   Glucose      70 - 99 mg/dL 87   Urea Nitrogen      7 - 30 mg/dL 11   Creatinine      0.52 - 1.04 mg/dL 0.82   GFR Estimate      >60 mL/min/1.73:m2 87   GFR Estimate If Black      >60 mL/min/1.73:m2 >90   Calcium      8.5 - 10.1 mg/dL 9.6   Bilirubin Total      0.2 - 1.3 mg/dL 0.4   Albumin      3.4 - 5.0 g/dL 3.9   Protein Total      6.8 - 8.8 g/dL 8.0   Alkaline Phosphatase      40 - 150 U/L 58   ALT      0 - 50 U/L 72 (H)   AST      0 - 45 U/L 35     Component      Latest Ref Rng & Units 2/17/2021   Cholesterol      <200 mg/dL 213 (H)   Triglycerides      <150 mg/dL 45   HDL Cholesterol      >49 mg/dL 65   LDL Cholesterol Calculated      <100 mg/dL 139 (H)   Non HDL Cholesterol      <130 mg/dL 148 (H)       Component      Latest Ref Rng & Units 2/17/2021   Color Urine       Yellow   Appearance Urine       Clear   Glucose Urine      NEG:Negative mg/dL Negative   Bilirubin Urine      NEG:Negative Negative   Ketones Urine      NEG:Negative mg/dL Negative   Specific Gravity Urine      1.003 - 1.035 1.016   Blood Urine      NEG:Negative Negative   pH Urine      5.0 - 7.0 pH 6.0   Protein Albumin Urine      NEG:Negative mg/dL Negative   Urobilinogen mg/dL      0.0 - 2.0 mg/dL 0.0   Nitrite Urine      NEG:Negative Negative   Leukocyte Esterase Urine      NEG:Negative Negative   Source       Midstream Urine     EKG: Personally reviewed but formal cardiology read pending: NSR  Low voltage QRS  Borderline ECG        Outside records reviewed  "from: care everywhere        ASSESSMENT and PLAN  Aga Alanis is a 41 year old female scheduled for laparoscopic sleeve gastrectomy on 3/9/21 by Dr. Dee in treatment of morbid obesity.  PAC referral for risk assessment and optimization for anesthesia with comorbid conditions of h/o DVT 2017, GERD, PCOS, depression:    Pre-operative considerations:  1.  Cardiac:  Functional status- METS >4.  Intermeidate risk surgery with 0.9% (RCRI #) risk of major adverse cardiac event.  Denies cardiac history, denies chest pain, SOB, ROSADO, palpitations, orthopnea, peripheral edema.  --EKG today shows: NSR, low voltage QRS  --will hold ASA 81mg for 7 days prior to DOS.   2.  Pulm:  Airway feasible.  NNEKA risk: Pt has known NNEKA, not using CPAP currently.  Never smoker.  3.  GI:  Risk of PONV score = 3.  If > 2, anti-emetic intervention recommended.  --GERD, continue omeprazole  4.  Psych: h/o depression, continue Xanax, Buspar, vilazodone as directed.  5.  Heme: h/o LUE DVT 2017.  Pt has heterozygous FVL mutation.  Followed by [facility].  Letter from TOMASZ Mas of [facility] dated 12/9/20 states \" I would recommend the patient receive DVT prophylaxis in the form of low molecular weight heparin while she is in the hospital and 2 weeks afterwards given her history of VTE and factor V Leiden mutation.\"  6.  Endo: h/o PCOS, pt will hold Aldactone and metformin DOS.     VTE risk: 1.8%    Patient is optimized and is acceptable candidate for the proposed procedure.  No further diagnostic evaluation is needed.       Delmy Black PA-C  Preoperative Assessment Center  [facility]  Phone: [phone]  Fax: [phone]  "

## 2021-02-18 LAB — INTERPRETATION ECG - MUSE: NORMAL

## 2021-02-19 LAB — DEPRECATED CALCIDIOL+CALCIFEROL SERPL-MC: 42 UG/L (ref 20–75)

## 2021-02-24 ENCOUNTER — VIRTUAL VISIT (OUTPATIENT)
Dept: ENDOCRINOLOGY | Facility: CLINIC | Age: 41
End: 2021-02-24
Payer: COMMERCIAL

## 2021-02-24 ENCOUNTER — ALLIED HEALTH/NURSE VISIT (OUTPATIENT)
Dept: SURGERY | Facility: CLINIC | Age: 41
End: 2021-02-24
Payer: COMMERCIAL

## 2021-02-24 VITALS
DIASTOLIC BLOOD PRESSURE: 75 MMHG | SYSTOLIC BLOOD PRESSURE: 111 MMHG | WEIGHT: 183 LBS | BODY MASS INDEX: 31.41 KG/M2 | HEART RATE: 84 BPM | OXYGEN SATURATION: 97 %

## 2021-02-24 DIAGNOSIS — E66.812 OBESITY, CLASS II, BMI 35-39.9: Primary | ICD-10-CM

## 2021-02-24 DIAGNOSIS — E66.811 CLASS 1 OBESITY DUE TO EXCESS CALORIES WITH SERIOUS COMORBIDITY AND BODY MASS INDEX (BMI) OF 31.0 TO 31.9 IN ADULT: Primary | ICD-10-CM

## 2021-02-24 DIAGNOSIS — E66.09 CLASS 1 OBESITY DUE TO EXCESS CALORIES WITH SERIOUS COMORBIDITY AND BODY MASS INDEX (BMI) OF 31.0 TO 31.9 IN ADULT: Primary | ICD-10-CM

## 2021-02-24 DIAGNOSIS — Z71.3 NUTRITIONAL COUNSELING: ICD-10-CM

## 2021-02-24 PROCEDURE — 97803 MED NUTRITION INDIV SUBSEQ: CPT | Mod: GT | Performed by: DIETITIAN, REGISTERED

## 2021-02-24 RX ORDER — VALACYCLOVIR HYDROCHLORIDE 1 G/1
TABLET, FILM COATED ORAL
Status: ON HOLD | COMMUNITY
Start: 2021-01-22 | End: 2021-04-27

## 2021-02-24 RX ORDER — NITROFURANTOIN 25; 75 MG/1; MG/1
CAPSULE ORAL
Status: ON HOLD | COMMUNITY
Start: 2021-01-14 | End: 2021-04-27

## 2021-02-24 RX ORDER — METFORMIN HCL 500 MG
TABLET, EXTENDED RELEASE 24 HR ORAL
Status: ON HOLD | COMMUNITY
Start: 2019-08-26 | End: 2021-04-27

## 2021-02-24 RX ORDER — VILAZODONE HYDROCHLORIDE 40 MG/1
40 TABLET ORAL
Status: ON HOLD | COMMUNITY
Start: 2019-05-06 | End: 2021-04-27

## 2021-02-24 RX ORDER — CETIRIZINE HYDROCHLORIDE 10 MG/1
10 TABLET ORAL
Status: ON HOLD | COMMUNITY
Start: 2020-11-28 | End: 2021-04-27

## 2021-02-24 RX ORDER — FLUCONAZOLE 150 MG/1
TABLET ORAL
COMMUNITY
Start: 2021-01-22 | End: 2024-05-31

## 2021-02-24 RX ORDER — SPIRONOLACTONE 100 MG/1
100 TABLET, FILM COATED ORAL
Status: ON HOLD | COMMUNITY
Start: 2019-12-19 | End: 2021-04-27

## 2021-02-24 RX ORDER — ALPRAZOLAM 0.25 MG
0.25 TABLET ORAL
Status: ON HOLD | COMMUNITY
Start: 2020-11-10 | End: 2021-04-27

## 2021-02-24 RX ORDER — BUSPIRONE HYDROCHLORIDE 15 MG/1
TABLET ORAL
Status: ON HOLD | COMMUNITY
Start: 2020-11-20 | End: 2021-04-27

## 2021-02-24 RX ORDER — ZINC GLUCONATE 50 MG
25 TABLET ORAL
Status: ON HOLD | COMMUNITY
Start: 2020-11-28 | End: 2021-04-26

## 2021-02-24 NOTE — PROGRESS NOTES
This patient is having sleeve gastrectomy by Dr. Dee on 03/09/21.    The following handouts were reviewed with the patient :  Before Your Surgery, Patient Checklist, Weight Loss Surgery Pre-operative Class, Preop Recommendations Quick Reference Guide, History and Physical, Medications to Avoid, Shower or Bathing Before Surgery, Bowel Preparation, Powerful Choices and [place] [facility] Directive.  Questions were addressed and understanding of content was verbalized.  Contact information was provided.  Preop/Postop instructions sent to patient in AVS and via [facility].    PAC VISIT: 2/17/21  PREOP RD VISIT: 1/20/21  MTM VISIT (if required): /1/20/21      Patient goal weight: 205  Weight today: 183    Call [phone] Jose Ferguson to confirm surgery date.     If not already scheduled, call [phone] to schedule your pre-operative history and physical with our PAC clinic.      Patient currently taking opioid/narcotic pain medications? No.    Patient accountability partner:  , who has also had a sleeve gastrectomy.

## 2021-02-24 NOTE — LETTER
"2/24/2021       RE: Aga Alanis  [address] [facility] [facility] [address]     Dear Colleague,    Thank you for referring your patient, Aga Alanis, to the [facility] [place] [facility]. Please see a copy of my visit note below.    Aga Alanis is a 40 year old female who is being evaluated via a billable video visit.       The patient has been notified of following:      \"This video visit will be conducted via a call between you and your physician/provider. We have found that certain health care needs can be provided without the need for an in-person physical exam.  This service lets us provide the care you need with a video conversation.  If a prescription is necessary we can send it directly to your pharmacy.  If lab work is needed we can place an order for that and you can then stop by our lab to have the test done at a later time.     Video visits are billed at different rates depending on your insurance coverage.  Please reach out to your insurance provider with any questions.     If during the course of the call the physician/provider feels a video visit is not appropriate, you will not be charged for this service.\"     Patient has given verbal consent for Video visit? Yes   How would you like to obtain your AVS? MyChart  If you are dropped from the video visit, the video invite should be resent to: Text to cell phone: [phone]  Will anyone else be joining your video visit? No        Video-Visit Details     Type of service:  Video Visit     Video Start Time: 11:07 AM   Video End Time: 11:45 AM    Originating Location (pt. Location): Home     Distant Location (provider location):  [facility]      Platform used for Video Visit: [employer]     During this virtual visit the patient is located in [place], patient verifies this as the location during the " "entirety of this visit.         Return Bariatric Nutrition Consultation Note     Reason For Visit: Nutrition Assessment     Aga Alanis is a 39 year old presenting today for follow-up bariatric nutrition consult.  Pt is interested in laparoscopic sleeve gastrectomy with Dr. Roberts expected surgery in Febuary 2021.  Patient is accompanied by self.  This is pt's 5th of 3 required nutrition visits prior to surgery. Pt referred by RICHARD Dumont (2/13/19).        Support System Reviewed With Patient 2/10/2019   Who do you have in your support network that can be available to help you for the first 2 weeks after surgery? yes   Who can you count on for support throughout your weight loss surgery journey? my , both in laws, aunt, children         ANTHROPOMETRICS:  Estimated body mass index is 37.89 kg/m  as calculated from the following:    Height as of 2/13/19: 1.607 m (5' 3.25\").    Weight as of 2/13/19: 97.8 kg (215 lb 9.6 oz).   Current weight: 180 lb ( per pt's report)      Required weight loss goal pre-op: 205 lbs from initial consult weight (goal weight 10 lbs or less before surgery)        2/10/2019   I have tried the following methods to lose weight Watching portions or calories, Exercise, Weight Watchers, Atkins type diet (low carb/high protein), OTC Medications         Weight Loss Questions Reviewed With Patient 2/10/2019   How long have you been overweight? Since late 20's to early 40's         SUPPLEMENT INFORMATION:  Zinc, vitamin C, Vitamin D, liziene and a probiotic.      NUTRITION HISTORY:    Doing well, pt reports no prior clear or full liquid diet education.         Progress Towards Previous Goals:   Relating To Eating:  Eat slowly (20-30 minutes per meal), chewing foods well (25 chews per bite/applesauce consistency). Stop as soon as you feel satisfied.-Met  9\" Plate method (1/2 plate non-starchy vegetables/fruit, 1/4 plate lean protein, 1/4 plate whole grain starch - no more " "than 1/2 cup carb/meal)-Met  Eat 3 meals per day -Met        Relating to beverages:  Reduce caffeine/carbonation/calorie containing beverages- Met  Separate fluids from meals by 30 minutes before, during, and after eating- Met  Drinking a small sip of water every 15 minutes. -Improving      Relating to dietary supplements:  Start a multivitamin containing iron daily-None at this time      Relating to activity:  Increase activity as able-Met         ADDITIONAL INFORMATION:  Pt prefers to eat organic foods, and she has a daughter that is lactose intolerant.      In the past she has lost weight from intermittent fasting from 12-18 fasts.        NUTRITION DIAGNOSIS:  Overweight r/t long history of self-monitoring deficit and excessive energy intake aeb BMI >25 kg/m2.-improving   And  Food- and Nutrition-related knowledge deficit r/t lack of prior exposure to information AEB pt unable to verbalize main points of bariatric clear and low-fat full liquid diet.    INTERVENTION:  Intervention Provided/Education Provided/Reviewed previous goals and encouraged patient to continue goals prior to surgery.     Provided instruction on bariatric clear and low-fat full liquid diets.  Provided the following handouts: Diet Guidelines for Bariatric Surgery, Sources of Protein, bariatric clear and low-fat full liquid diets. surgery and RD contact information.         Questions Reviewed With Patient 2/10/2019   How ready are you to make changes regarding your weight? Number 1 = Not ready at all to make changes up to 10 = very ready. 10   How confident are you that you can change? 1 = Not confident that you will be successful making changes up to 10 = very confident. 10         Patient Understanding: good  Expected Compliance: good     GOALS:  Relating To Eating:  Eat slowly (20-30 minutes per meal), chewing foods well (25 chews per bite/applesauce consistency). Stop as soon as you feel satisfied.   9\" Plate method (1/2 plate non-starchy " vegetables/fruit, 1/4 plate lean protein, 1/4 plate whole grain starch - no more than 1/2 cup carb/meal)  Eat 3 meals per day        Relating to beverages:  Reduce caffeine/carbonation/calorie containing beverages  Separate fluids from meals by 30 minutes before, during, and after eating  Drinking a small sip of water every 15 minutes.      Relating to dietary supplements:  Start a multivitamin containing iron daily     Relating to activity:  Increase activity as able     *Protein Shake Criteria: no more than 250 Calories, at least 20 grams of protein, and less than 10 grams of sugar.   - Try Genpro unflavored for a protein powder it is wheat free. For a protein shake try 1/2 cup of fruit, almond milk and a scoop of protein powder.   - Can also try the [employer].     Goals after surgery:   1. Follow the bariatric post-op diet advancement schedule:  - Bariatric clear liquid diet the day before surgery and day of surgery (while in the hospital).  - Bariatric low-fat full liquid diet on post-op days 1 through 13 (2 weeks).  2. Sip on 48-64 oz (or greater) fluids daily, recording intake to help stay on-track.  - Drink at least 2 oz of fluid every 30 min.  3. Stop multivitamin at surgery. We will discuss starting a chewable multivitamin at the one week post-op visit.     Weight Loss Surgery Post-Op Diet Progression    Diet Guidelines after Weight-loss Surgery  [email or web address]     Checklist for Stages of Your Diet after Weight-loss Surgery  [email or web address]     Portion Sizes after Weight Loss Surgery  [email or web address]    Your Stage 1 Diet: Clear Liquids (3/8)  [email or web address]     Your Stage 2 Diet: Low-fat Full Liquids (3/9-3/22)  [email or web address]        Follow-Up:   Recommend 1 month  follow up visits to assist with lifestyle changes or per insurance.     Time spent with patient: 38 minutes.  Leila Garces, MS, RD, LD

## 2021-02-24 NOTE — PATIENT INSTRUCTIONS
Below is a recap of our conversation regarding your upcoming Sleeve Gastrectomy on 3/9/21.    DAY BEFORE YOUR SURGERY     - Follow a clear liquid diet.       - Ensure you are well hydrated all day!     - Nothing to eat or drink after midnight.  You may have sips of water with medications up to 3 hours before your surgery if PAC has cleared you to do so.    SHOWER    - Follow instructions for pre-op shower using the required soap (chlorhexidine).      - You will take a shower the night before surgery and a shower the morning of surgery.  The soap can be very drying.  Do not put lotion on.    TRANSPORTATION & CARE    - You will need a  to take you to the hospital the day of surgery.    - You will need a  to pick you up from the hospital the day of discharge (usually the afternoon/evening the day after surgery).    - You will need someone to stay with you for the first 1-2 days after surgery, especially if you are taking prescription pain medicine.      AFTER SURGERY    MEDICATIONS     - Depending on the size and number of medications you take, you may need to space/change the time you take your medication so you do not overfill your stomach.   - Make sure you follow-up with your primary provider to make medication changes needed.   - If you have diabetes, follow-up with your provider that orders your diabetes medications and check your blood sugar regularly.      You will receive the following prescriptions at discharge (unless you are allergic or have other reasons not to take them):      Prilosec (omeprazole): This medication is for control of stomach acid.  Open the capsule and put in water, protein shake or other approve liquid.  Take as directed.  (Please disregard the prescription bottle instructions to not open the capsule).      Levsin (hyoscyamine): This medication is to help control spasms/cramping in the stomach and intestines.  Take as needed for cramping.    Zofran (ondansetron):  This  medication is for nausea.  The medication is to be placed under the tongue (sublingual) and taken as directed for nausea.    Senna: This medication is a stool softener:  Take as directed.  You may cut it in half to make it easier to swallow.  It is important to take this medication if you are taking a narcotic pain medicine as the pain medication can be constipating.  Senna can be purchased over-the-counter.      Narcotic pain medicine: Take as directed for pain.  If you are not having a lot of pain, you can take Tylenol or Extra Strength Tylenol per the bottle instructions.  The pain medicine can cause constipation.  Do not drive while taking narcotic pain medication.     PAIN CONTROL    - Take your pain medicine as needed, as directed.    - You can use Tylenol or Tylenol Extra Strength if you are not having a lot of pain.    - Use an ice pack on the incisions for the first 24 - 48 hours:  Use a barrier between the ice pack and the skin.  Do not leave the ice on longer than 20 minutes at each time.      - Change positions frequently.  Walking around once an hour will also help.    - You may also try a heating pad on your abdomen to help soothe cramping.  Use a barrier between the heating pad and your skin.  Do not leave on longer than 20 minutes at each time.    HYDRATION    - You will be provided with a small medicine cup after surgery.  It holds one ounce of fluid (30 mL).  You need to drink one of these (1 oz) every 10 to 15 .    - Your goal is 48 to 64 ounces each day.  This amount will help prevent dehydration.      - Keep a tally sheet next to you and [place] each time you drink one ounce of fluid.  You should have at least 4-6 marks for each hour.    - Try keeping a water bottle by your bed.  Drink a little before going to sleep, if you wake in the middle of the night, and in the morning before getting out of bed.    - Keep an eye on your urine.  It is a good indicator of your hydration status.  Your urine  should be clear yellow or clear light yellow.      DIET    For Dr. Dee Patients:     - You will be following the Stage 2 - Full Liquid diet upon discharge.  Ensure you have a variety of proper full liquids at home to support you in taking in the proper nutrition.     - Please refer to the Stage 2 - Full Liquid diet handout provided by the Dietician.    BREATHING AND ACTIVITY    - Use your incentive spirometer each hour while awake.  Sitting up or standing, take 5 - 10 slow, deep breaths each time, focusing on expanding your lungs.  This should be done for the first couple of weeks after surgery.    - Get up and walk around each hour while you are awake - at least 10 minutes.  Walking will help with circulation, bowel regulation and will help you feel better.    -  Do not lift anything greater than 10 lb for the first 4 weeks.    WOUND  CARE  You may have surgical glue or steri-strips over your incision after surgery.    - Surgical glue: looks like a clear film over your incisions and will wear off a little at a time over the first 7-10 days after surgery.      - Steri-Strips: Adhesive strips of tape over your incisions.  They will fall off over the first 7-10 days after surgery.    Showering: you may shower the day you get home unless your surgeon tells you differently.    - Wash gently around incisions with warm soapy water, rinse well, and gently pat dry.    - No tub baths until all incisions are healed.      FOLLOW-UP CALL    - You will receive a post-op phone call two days after surgery to check in and see how you are doing (If your surgery is on a Friday, your phone call will be on the Monday following your surgery).  You can always send us a message via [employer] and the Get Well Loop carl.    GET WELL LOOP  [email or web address]    - Track your fluid intake!    - Communicate with nursing staff.    Please let us know if you have any additional questions.

## 2021-02-24 NOTE — PROGRESS NOTES
"Aga Alanis is a 40 year old female who is being evaluated via a billable video visit.       The patient has been notified of following:      \"This video visit will be conducted via a call between you and your physician/provider. We have found that certain health care needs can be provided without the need for an in-person physical exam.  This service lets us provide the care you need with a video conversation.  If a prescription is necessary we can send it directly to your pharmacy.  If lab work is needed we can place an order for that and you can then stop by our lab to have the test done at a later time.     Video visits are billed at different rates depending on your insurance coverage.  Please reach out to your insurance provider with any questions.     If during the course of the call the physician/provider feels a video visit is not appropriate, you will not be charged for this service.\"     Patient has given verbal consent for Video visit? Yes   How would you like to obtain your AVS? MyChart  If you are dropped from the video visit, the video invite should be resent to: Text to cell phone: [phone]  Will anyone else be joining your video visit? No        Video-Visit Details     Type of service:  Video Visit     Video Start Time: 11:07 AM   Video End Time: 11:45 AM    Originating Location (pt. Location): Home     Distant Location (provider location):  [facility] [place]      Platform used for Video Visit: [employer]     During this virtual visit the patient is located in [place], patient verifies this as the location during the entirety of this visit.         Return Bariatric Nutrition Consultation Note     Reason For Visit: Nutrition Assessment     Aga Alanis is a 39 year old presenting today for follow-up bariatric nutrition consult.  Pt is interested in laparoscopic sleeve gastrectomy with Dr. Roberts expected surgery in Febuary 2021.  Patient is " "accompanied by self.  This is pt's 5th of 3 required nutrition visits prior to surgery. Pt referred by RICHARD Dumont (2/13/19).        Support System Reviewed With Patient 2/10/2019   Who do you have in your support network that can be available to help you for the first 2 weeks after surgery? yes   Who can you count on for support throughout your weight loss surgery journey? my , both in laws, aunt, children         ANTHROPOMETRICS:  Estimated body mass index is 37.89 kg/m  as calculated from the following:    Height as of 2/13/19: 1.607 m (5' 3.25\").    Weight as of 2/13/19: 97.8 kg (215 lb 9.6 oz).   Current weight: 180 lb ( per pt's report)      Required weight loss goal pre-op: 205 lbs from initial consult weight (goal weight 10 lbs or less before surgery)        2/10/2019   I have tried the following methods to lose weight Watching portions or calories, Exercise, Weight Watchers, Atkins type diet (low carb/high protein), OTC Medications         Weight Loss Questions Reviewed With Patient 2/10/2019   How long have you been overweight? Since late 20's to early 40's         SUPPLEMENT INFORMATION:  Zinc, vitamin C, Vitamin D, liziene and a probiotic.      NUTRITION HISTORY:    Doing well, pt reports no prior clear or full liquid diet education.         Progress Towards Previous Goals:   Relating To Eating:  Eat slowly (20-30 minutes per meal), chewing foods well (25 chews per bite/applesauce consistency). Stop as soon as you feel satisfied.-Met  9\" Plate method (1/2 plate non-starchy vegetables/fruit, 1/4 plate lean protein, 1/4 plate whole grain starch - no more than 1/2 cup carb/meal)-Met  Eat 3 meals per day -Met        Relating to beverages:  Reduce caffeine/carbonation/calorie containing beverages- Met  Separate fluids from meals by 30 minutes before, during, and after eating- Met  Drinking a small sip of water every 15 minutes. -Improving      Relating to dietary supplements:  Start a " "multivitamin containing iron daily-None at this time      Relating to activity:  Increase activity as able-Met         ADDITIONAL INFORMATION:  Pt prefers to eat organic foods, and she has a daughter that is lactose intolerant.      In the past she has lost weight from intermittent fasting from 12-18 fasts.        NUTRITION DIAGNOSIS:  Overweight r/t long history of self-monitoring deficit and excessive energy intake aeb BMI >25 kg/m2.-improving   And  Food- and Nutrition-related knowledge deficit r/t lack of prior exposure to information AEB pt unable to verbalize main points of bariatric clear and low-fat full liquid diet.    INTERVENTION:  Intervention Provided/Education Provided/Reviewed previous goals and encouraged patient to continue goals prior to surgery.     Provided instruction on bariatric clear and low-fat full liquid diets.  Provided the following handouts: Diet Guidelines for Bariatric Surgery, Sources of Protein, bariatric clear and low-fat full liquid diets. surgery and RD contact information.         Questions Reviewed With Patient 2/10/2019   How ready are you to make changes regarding your weight? Number 1 = Not ready at all to make changes up to 10 = very ready. 10   How confident are you that you can change? 1 = Not confident that you will be successful making changes up to 10 = very confident. 10         Patient Understanding: good  Expected Compliance: good     GOALS:  Relating To Eating:  Eat slowly (20-30 minutes per meal), chewing foods well (25 chews per bite/applesauce consistency). Stop as soon as you feel satisfied.   9\" Plate method (1/2 plate non-starchy vegetables/fruit, 1/4 plate lean protein, 1/4 plate whole grain starch - no more than 1/2 cup carb/meal)  Eat 3 meals per day        Relating to beverages:  Reduce caffeine/carbonation/calorie containing beverages  Separate fluids from meals by 30 minutes before, during, and after eating  Drinking a small sip of water every 15 " minutes.      Relating to dietary supplements:  Start a multivitamin containing iron daily     Relating to activity:  Increase activity as able     *Protein Shake Criteria: no more than 250 Calories, at least 20 grams of protein, and less than 10 grams of sugar.   - Try Genpro unflavored for a protein powder it is wheat free. For a protein shake try 1/2 cup of fruit, almond milk and a scoop of protein powder.   - Can also try the [employer] Corepower.     Goals after surgery:   1. Follow the bariatric post-op diet advancement schedule:  - Bariatric clear liquid diet the day before surgery and day of surgery (while in the hospital).  - Bariatric low-fat full liquid diet on post-op days 1 through 13 (2 weeks).  2. Sip on 48-64 oz (or greater) fluids daily, recording intake to help stay on-track.  - Drink at least 2 oz of fluid every 30 min.  3. Stop multivitamin at surgery. We will discuss starting a chewable multivitamin at the one week post-op visit.     Weight Loss Surgery Post-Op Diet Progression    Diet Guidelines after Weight-loss Surgery  [email or web address]     Checklist for Stages of Your Diet after Weight-loss Surgery  [email or web address]     Portion Sizes after Weight Loss Surgery  [email or web address]    Your Stage 1 Diet: Clear Liquids (3/8)  [email or web address]     Your Stage 2 Diet: Low-fat Full Liquids (3/9-3/22)  [email or web address]        Follow-Up:   Recommend 1 month  follow up visits to assist with lifestyle changes or per insurance.     Time spent with patient: 38 minutes.  Leila Garces, MS, RD, LD

## 2021-02-24 NOTE — PATIENT INSTRUCTIONS
"GOALS:  Relating To Eating:  Eat slowly (20-30 minutes per meal), chewing foods well (25 chews per bite/applesauce consistency). Stop as soon as you feel satisfied.   9\" Plate method (1/2 plate non-starchy vegetables/fruit, 1/4 plate lean protein, 1/4 plate whole grain starch - no more than 1/2 cup carb/meal)  Eat 3 meals per day        Relating to beverages:  Reduce caffeine/carbonation/calorie containing beverages  Separate fluids from meals by 30 minutes before, during, and after eating  Drinking a small sip of water every 15 minutes.      Relating to dietary supplements:  Start a multivitamin containing iron daily     Relating to activity:  Increase activity as able     *Protein Shake Criteria: no more than 250 Calories, at least 20 grams of protein, and less than 10 grams of sugar.   - Try Genpro unflavored for a protein powder it is wheat free. For a protein shake try 1/2 cup of fruit, almond milk and a scoop of protein powder.   - Can also try the [employer].     Goals after surgery:   1. Follow the bariatric post-op diet advancement schedule:  - Bariatric clear liquid diet the day before surgery and day of surgery (while in the hospital).  - Bariatric low-fat full liquid diet on post-op days 1 through 13 (2 weeks).  2. Sip on 48-64 oz (or greater) fluids daily, recording intake to help stay on-track.  - Drink at least 2 oz of fluid every 30 min.  3. Stop multivitamin at surgery. We will discuss starting a chewable multivitamin at the one week post-op visit.     Weight Loss Surgery Post-Op Diet Progression    Diet Guidelines after Weight-loss Surgery  [email or web address]     Checklist for Stages of Your Diet after Weight-loss Surgery  [email or web address]     Portion Sizes after Weight Loss Surgery  [email or web address]    Your Stage 1 Diet: Clear Liquids (3/8)  [email or web address]     Your Stage 2 Diet: Low-fat Full Liquids " (3/9-3/22)  [email or web address]

## 2021-02-25 ENCOUNTER — TELEPHONE (OUTPATIENT)
Dept: ENDOCRINOLOGY | Facility: CLINIC | Age: 41
End: 2021-02-25

## 2021-02-25 NOTE — TELEPHONE ENCOUNTER
Called patient to schedule COVID-19 testing, scheduled 3/5/21 at [facility], 1:00 p.m. Sleeve surgery scheduled 3/9/21 with Dr. Dee.

## 2021-03-01 ENCOUNTER — TELEPHONE (OUTPATIENT)
Dept: ENDOCRINOLOGY | Facility: CLINIC | Age: 41
End: 2021-03-01

## 2021-03-01 NOTE — TELEPHONE ENCOUNTER
Called Piyush ([phone]) to change surgery date from 3/9/21 to 4/26/21 per patient request. Staff I spoke with said he would send a message to the RN who reviewed the auth request and have her change the date to 4/26. Authorization # [id number].

## 2021-03-01 NOTE — TELEPHONE ENCOUNTER
Called [employer] to check if sleeve has been approved. Authorization # [id number], effective 3/9/21. Provider Services [phone]

## 2021-04-11 DIAGNOSIS — Z11.59 ENCOUNTER FOR SCREENING FOR OTHER VIRAL DISEASES: ICD-10-CM

## 2021-04-20 ENCOUNTER — TELEPHONE (OUTPATIENT)
Dept: ENDOCRINOLOGY | Facility: CLINIC | Age: 41
End: 2021-04-20

## 2021-04-20 NOTE — TELEPHONE ENCOUNTER
Called patient to see if she had schedule a pre-op H&P. She has not but will call today to get that scheduled. I made her 1-week postop appointments and requesting providers to schedule 1 month when patient is seen as patient is not working from home.

## 2021-04-21 ENCOUNTER — TRANSFERRED RECORDS (OUTPATIENT)
Dept: HEALTH INFORMATION MANAGEMENT | Facility: CLINIC | Age: 41
End: 2021-04-21

## 2021-04-22 ENCOUNTER — TELEPHONE (OUTPATIENT)
Dept: ENDOCRINOLOGY | Facility: CLINIC | Age: 41
End: 2021-04-22

## 2021-04-22 DIAGNOSIS — Z01.818 PREOP TESTING: ICD-10-CM

## 2021-04-22 LAB
LABORATORY COMMENT REPORT: NORMAL
SARS-COV-2 RNA RESP QL NAA+PROBE: NEGATIVE
SARS-COV-2 RNA RESP QL NAA+PROBE: NORMAL
SPECIMEN SOURCE: NORMAL
SPECIMEN SOURCE: NORMAL

## 2021-04-22 PROCEDURE — U0005 INFEC AGEN DETEC AMPLI PROBE: HCPCS | Performed by: NURSE PRACTITIONER

## 2021-04-22 PROCEDURE — U0003 INFECTIOUS AGENT DETECTION BY NUCLEIC ACID (DNA OR RNA); SEVERE ACUTE RESPIRATORY SYNDROME CORONAVIRUS 2 (SARS-COV-2) (CORONAVIRUS DISEASE [COVID-19]), AMPLIFIED PROBE TECHNIQUE, MAKING USE OF HIGH THROUGHPUT TECHNOLOGIES AS DESCRIBED BY CMS-2020-01-R: HCPCS | Performed by: NURSE PRACTITIONER

## 2021-04-25 ENCOUNTER — ANESTHESIA EVENT (OUTPATIENT)
Dept: SURGERY | Facility: CLINIC | Age: 41
DRG: 620 | End: 2021-04-25
Payer: COMMERCIAL

## 2021-04-26 ENCOUNTER — HOSPITAL ENCOUNTER (INPATIENT)
Facility: CLINIC | Age: 41
LOS: 1 days | Discharge: HOME | DRG: 620 | End: 2021-04-27
Attending: SURGERY | Admitting: SURGERY
Payer: COMMERCIAL

## 2021-04-26 ENCOUNTER — ANESTHESIA (OUTPATIENT)
Dept: SURGERY | Facility: CLINIC | Age: 41
DRG: 620 | End: 2021-04-26
Payer: COMMERCIAL

## 2021-04-26 DIAGNOSIS — Z98.84 S/P LAPAROSCOPIC SLEEVE GASTRECTOMY: Primary | ICD-10-CM

## 2021-04-26 DIAGNOSIS — E66.9 OBESITY: ICD-10-CM

## 2021-04-26 DIAGNOSIS — I82.622 ACUTE DEEP VEIN THROMBOSIS (DVT) OF BRACHIAL VEIN OF LEFT UPPER EXTREMITY (H): ICD-10-CM

## 2021-04-26 LAB
CREAT SERPL-MCNC: 0.66 MG/DL (ref 0.52–1.04)
GFR SERPL CREATININE-BSD FRML MDRD: >90 ML/MIN/{1.73_M2}
GLUCOSE BLDC GLUCOMTR-MCNC: 90 MG/DL (ref 70–99)
PLATELET # BLD AUTO: 233 10E9/L (ref 150–450)

## 2021-04-26 PROCEDURE — 250N000025 HC SEVOFLURANE, PER MIN: Performed by: SURGERY

## 2021-04-26 PROCEDURE — 710N000010 HC RECOVERY PHASE 1, LEVEL 2, PER MIN: Performed by: SURGERY

## 2021-04-26 PROCEDURE — 250N000011 HC RX IP 250 OP 636: Performed by: ANESTHESIOLOGY

## 2021-04-26 PROCEDURE — 120N000002 HC R&B MED SURG/OB UMMC

## 2021-04-26 PROCEDURE — 258N000003 HC RX IP 258 OP 636: Performed by: STUDENT IN AN ORGANIZED HEALTH CARE EDUCATION/TRAINING PROGRAM

## 2021-04-26 PROCEDURE — 250N000011 HC RX IP 250 OP 636: Performed by: STUDENT IN AN ORGANIZED HEALTH CARE EDUCATION/TRAINING PROGRAM

## 2021-04-26 PROCEDURE — G0378 HOSPITAL OBSERVATION PER HR: HCPCS

## 2021-04-26 PROCEDURE — 88305 TISSUE EXAM BY PATHOLOGIST: CPT | Mod: 26 | Performed by: PATHOLOGY

## 2021-04-26 PROCEDURE — 360N000077 HC SURGERY LEVEL 4, PER MIN: Performed by: SURGERY

## 2021-04-26 PROCEDURE — 999N000141 HC STATISTIC PRE-PROCEDURE NURSING ASSESSMENT: Performed by: SURGERY

## 2021-04-26 PROCEDURE — 250N000009 HC RX 250: Performed by: NURSE ANESTHETIST, CERTIFIED REGISTERED

## 2021-04-26 PROCEDURE — 250N000011 HC RX IP 250 OP 636: Performed by: SURGERY

## 2021-04-26 PROCEDURE — 36415 COLL VENOUS BLD VENIPUNCTURE: CPT | Performed by: STUDENT IN AN ORGANIZED HEALTH CARE EDUCATION/TRAINING PROGRAM

## 2021-04-26 PROCEDURE — 96374 THER/PROPH/DIAG INJ IV PUSH: CPT

## 2021-04-26 PROCEDURE — 258N000003 HC RX IP 258 OP 636: Performed by: SURGERY

## 2021-04-26 PROCEDURE — 85049 AUTOMATED PLATELET COUNT: CPT | Performed by: STUDENT IN AN ORGANIZED HEALTH CARE EDUCATION/TRAINING PROGRAM

## 2021-04-26 PROCEDURE — 250N000011 HC RX IP 250 OP 636: Performed by: NURSE ANESTHETIST, CERTIFIED REGISTERED

## 2021-04-26 PROCEDURE — 370N000017 HC ANESTHESIA TECHNICAL FEE, PER MIN: Performed by: SURGERY

## 2021-04-26 PROCEDURE — 258N000003 HC RX IP 258 OP 636: Performed by: NURSE ANESTHETIST, CERTIFIED REGISTERED

## 2021-04-26 PROCEDURE — 272N000001 HC OR GENERAL SUPPLY STERILE: Performed by: SURGERY

## 2021-04-26 PROCEDURE — 999N001017 HC STATISTIC GLUCOSE BY METER IP

## 2021-04-26 PROCEDURE — 0DB64Z3 EXCISION OF STOMACH, PERCUTANEOUS ENDOSCOPIC APPROACH, VERTICAL: ICD-10-PCS | Performed by: SURGERY

## 2021-04-26 PROCEDURE — 96376 TX/PRO/DX INJ SAME DRUG ADON: CPT

## 2021-04-26 PROCEDURE — 88305 TISSUE EXAM BY PATHOLOGIST: CPT | Mod: TC | Performed by: SURGERY

## 2021-04-26 PROCEDURE — 82565 ASSAY OF CREATININE: CPT | Performed by: STUDENT IN AN ORGANIZED HEALTH CARE EDUCATION/TRAINING PROGRAM

## 2021-04-26 PROCEDURE — 250N000013 HC RX MED GY IP 250 OP 250 PS 637: Performed by: STUDENT IN AN ORGANIZED HEALTH CARE EDUCATION/TRAINING PROGRAM

## 2021-04-26 PROCEDURE — 0BQT4ZZ REPAIR DIAPHRAGM, PERCUTANEOUS ENDOSCOPIC APPROACH: ICD-10-PCS | Performed by: SURGERY

## 2021-04-26 RX ORDER — HYDROMORPHONE HYDROCHLORIDE 1 MG/ML
.3-.5 INJECTION, SOLUTION INTRAMUSCULAR; INTRAVENOUS; SUBCUTANEOUS EVERY 5 MIN PRN
Status: DISCONTINUED | OUTPATIENT
Start: 2021-04-26 | End: 2021-04-26 | Stop reason: HOSPADM

## 2021-04-26 RX ORDER — ACETAMINOPHEN 325 MG/10.15ML
1000 LIQUID ORAL EVERY 8 HOURS
Status: CANCELLED | OUTPATIENT
Start: 2021-04-26

## 2021-04-26 RX ORDER — SODIUM CHLORIDE, SODIUM LACTATE, POTASSIUM CHLORIDE, CALCIUM CHLORIDE 600; 310; 30; 20 MG/100ML; MG/100ML; MG/100ML; MG/100ML
INJECTION, SOLUTION INTRAVENOUS CONTINUOUS
Status: DISCONTINUED | OUTPATIENT
Start: 2021-04-26 | End: 2021-04-26

## 2021-04-26 RX ORDER — DEXTROSE MONOHYDRATE 25 G/50ML
25-50 INJECTION, SOLUTION INTRAVENOUS
Status: CANCELLED | OUTPATIENT
Start: 2021-04-26

## 2021-04-26 RX ORDER — ONDANSETRON 2 MG/ML
INJECTION INTRAMUSCULAR; INTRAVENOUS PRN
Status: DISCONTINUED | OUTPATIENT
Start: 2021-04-26 | End: 2021-04-26

## 2021-04-26 RX ORDER — NALOXONE HYDROCHLORIDE 0.4 MG/ML
0.4 INJECTION, SOLUTION INTRAMUSCULAR; INTRAVENOUS; SUBCUTANEOUS
Status: DISCONTINUED | OUTPATIENT
Start: 2021-04-26 | End: 2021-04-27 | Stop reason: HOSPADM

## 2021-04-26 RX ORDER — ONDANSETRON 2 MG/ML
4 INJECTION INTRAMUSCULAR; INTRAVENOUS EVERY 30 MIN PRN
Status: DISCONTINUED | OUTPATIENT
Start: 2021-04-26 | End: 2021-04-26

## 2021-04-26 RX ORDER — LIDOCAINE 40 MG/G
CREAM TOPICAL
Status: DISCONTINUED | OUTPATIENT
Start: 2021-04-26 | End: 2021-04-27 | Stop reason: HOSPADM

## 2021-04-26 RX ORDER — OXYCODONE HCL 5 MG/5 ML
5 SOLUTION, ORAL ORAL EVERY 4 HOURS PRN
Status: CANCELLED | OUTPATIENT
Start: 2021-04-26

## 2021-04-26 RX ORDER — CEFAZOLIN SODIUM 1 G/3ML
1 INJECTION, POWDER, FOR SOLUTION INTRAMUSCULAR; INTRAVENOUS SEE ADMIN INSTRUCTIONS
Status: DISCONTINUED | OUTPATIENT
Start: 2021-04-26 | End: 2021-04-26 | Stop reason: HOSPADM

## 2021-04-26 RX ORDER — AMOXICILLIN 250 MG
2 CAPSULE ORAL 2 TIMES DAILY
Status: DISCONTINUED | OUTPATIENT
Start: 2021-04-26 | End: 2021-04-27 | Stop reason: HOSPADM

## 2021-04-26 RX ORDER — PROCHLORPERAZINE MALEATE 5 MG
10 TABLET ORAL EVERY 6 HOURS PRN
Status: DISCONTINUED | OUTPATIENT
Start: 2021-04-26 | End: 2021-04-27 | Stop reason: HOSPADM

## 2021-04-26 RX ORDER — DROPERIDOL 2.5 MG/ML
0.62 INJECTION, SOLUTION INTRAMUSCULAR; INTRAVENOUS ONCE
Status: DISCONTINUED | OUTPATIENT
Start: 2021-04-26 | End: 2021-04-26 | Stop reason: HOSPADM

## 2021-04-26 RX ORDER — NALOXONE HYDROCHLORIDE 0.4 MG/ML
0.2 INJECTION, SOLUTION INTRAMUSCULAR; INTRAVENOUS; SUBCUTANEOUS
Status: DISCONTINUED | OUTPATIENT
Start: 2021-04-26 | End: 2021-04-27 | Stop reason: HOSPADM

## 2021-04-26 RX ORDER — CEFAZOLIN SODIUM 2 G/100ML
2 INJECTION, SOLUTION INTRAVENOUS
Status: COMPLETED | OUTPATIENT
Start: 2021-04-26 | End: 2021-04-26

## 2021-04-26 RX ORDER — LIDOCAINE 40 MG/G
CREAM TOPICAL
Status: DISCONTINUED | OUTPATIENT
Start: 2021-04-26 | End: 2021-04-26 | Stop reason: HOSPADM

## 2021-04-26 RX ORDER — ACETAMINOPHEN 500 MG
1000 TABLET ORAL EVERY 8 HOURS
Status: DISCONTINUED | OUTPATIENT
Start: 2021-04-26 | End: 2021-04-27

## 2021-04-26 RX ORDER — FENTANYL CITRATE 50 UG/ML
25-50 INJECTION, SOLUTION INTRAMUSCULAR; INTRAVENOUS
Status: DISCONTINUED | OUTPATIENT
Start: 2021-04-26 | End: 2021-04-26 | Stop reason: HOSPADM

## 2021-04-26 RX ORDER — SCOLOPAMINE TRANSDERMAL SYSTEM 1 MG/1
1 PATCH, EXTENDED RELEASE TRANSDERMAL
Status: DISCONTINUED | OUTPATIENT
Start: 2021-04-26 | End: 2021-04-27 | Stop reason: HOSPADM

## 2021-04-26 RX ORDER — ONDANSETRON 4 MG/1
4 TABLET, ORALLY DISINTEGRATING ORAL EVERY 30 MIN PRN
Status: DISCONTINUED | OUTPATIENT
Start: 2021-04-26 | End: 2021-04-26

## 2021-04-26 RX ORDER — PROPOFOL 10 MG/ML
INJECTION, EMULSION INTRAVENOUS PRN
Status: DISCONTINUED | OUTPATIENT
Start: 2021-04-26 | End: 2021-04-26

## 2021-04-26 RX ORDER — NICOTINE POLACRILEX 4 MG
15-30 LOZENGE BUCCAL
Status: CANCELLED | OUTPATIENT
Start: 2021-04-26

## 2021-04-26 RX ORDER — DIPHENHYDRAMINE HCL 25 MG
25 CAPSULE ORAL EVERY 6 HOURS PRN
Status: DISCONTINUED | OUTPATIENT
Start: 2021-04-26 | End: 2021-04-27 | Stop reason: HOSPADM

## 2021-04-26 RX ORDER — SODIUM CHLORIDE, SODIUM LACTATE, POTASSIUM CHLORIDE, CALCIUM CHLORIDE 600; 310; 30; 20 MG/100ML; MG/100ML; MG/100ML; MG/100ML
INJECTION, SOLUTION INTRAVENOUS CONTINUOUS
Status: DISCONTINUED | OUTPATIENT
Start: 2021-04-26 | End: 2021-04-27 | Stop reason: HOSPADM

## 2021-04-26 RX ORDER — SODIUM CHLORIDE, SODIUM LACTATE, POTASSIUM CHLORIDE, CALCIUM CHLORIDE 600; 310; 30; 20 MG/100ML; MG/100ML; MG/100ML; MG/100ML
INJECTION, SOLUTION INTRAVENOUS CONTINUOUS PRN
Status: DISCONTINUED | OUTPATIENT
Start: 2021-04-26 | End: 2021-04-26

## 2021-04-26 RX ORDER — HYOSCYAMINE SULFATE 0.125 MG
125 TABLET ORAL EVERY 6 HOURS PRN
Status: DISCONTINUED | OUTPATIENT
Start: 2021-04-26 | End: 2021-04-27 | Stop reason: HOSPADM

## 2021-04-26 RX ORDER — SODIUM CHLORIDE, SODIUM LACTATE, POTASSIUM CHLORIDE, CALCIUM CHLORIDE 600; 310; 30; 20 MG/100ML; MG/100ML; MG/100ML; MG/100ML
INJECTION, SOLUTION INTRAVENOUS CONTINUOUS
Status: DISCONTINUED | OUTPATIENT
Start: 2021-04-26 | End: 2021-04-26 | Stop reason: HOSPADM

## 2021-04-26 RX ORDER — VILAZODONE HYDROCHLORIDE 40 MG/1
40 TABLET ORAL EVERY MORNING
Status: DISCONTINUED | OUTPATIENT
Start: 2021-04-27 | End: 2021-04-27 | Stop reason: HOSPADM

## 2021-04-26 RX ORDER — LIDOCAINE HYDROCHLORIDE 20 MG/ML
INJECTION, SOLUTION INFILTRATION; PERINEURAL PRN
Status: DISCONTINUED | OUTPATIENT
Start: 2021-04-26 | End: 2021-04-26

## 2021-04-26 RX ORDER — ASPIRIN 81 MG/1
81 TABLET ORAL EVERY MORNING
Status: DISCONTINUED | OUTPATIENT
Start: 2021-04-27 | End: 2021-04-27 | Stop reason: HOSPADM

## 2021-04-26 RX ORDER — DEXAMETHASONE SODIUM PHOSPHATE 4 MG/ML
INJECTION, SOLUTION INTRA-ARTICULAR; INTRALESIONAL; INTRAMUSCULAR; INTRAVENOUS; SOFT TISSUE PRN
Status: DISCONTINUED | OUTPATIENT
Start: 2021-04-26 | End: 2021-04-26

## 2021-04-26 RX ORDER — ONDANSETRON 4 MG/1
4 TABLET, ORALLY DISINTEGRATING ORAL EVERY 6 HOURS PRN
Status: DISCONTINUED | OUTPATIENT
Start: 2021-04-26 | End: 2021-04-27 | Stop reason: HOSPADM

## 2021-04-26 RX ORDER — HYDROMORPHONE HYDROCHLORIDE 1 MG/ML
.3-.5 INJECTION, SOLUTION INTRAMUSCULAR; INTRAVENOUS; SUBCUTANEOUS EVERY 5 MIN PRN
Status: DISCONTINUED | OUTPATIENT
Start: 2021-04-26 | End: 2021-04-26

## 2021-04-26 RX ORDER — HYDROMORPHONE HCL IN WATER/PF 6 MG/30 ML
0.2 PATIENT CONTROLLED ANALGESIA SYRINGE INTRAVENOUS
Status: DISCONTINUED | OUTPATIENT
Start: 2021-04-26 | End: 2021-04-27

## 2021-04-26 RX ORDER — HYOSCYAMINE SULFATE 0.125 MG
125 TABLET ORAL 3 TIMES DAILY
Status: DISCONTINUED | OUTPATIENT
Start: 2021-04-27 | End: 2021-04-27

## 2021-04-26 RX ORDER — OXYCODONE HYDROCHLORIDE 5 MG/1
5-10 TABLET ORAL EVERY 4 HOURS PRN
Status: DISCONTINUED | OUTPATIENT
Start: 2021-04-26 | End: 2021-04-27 | Stop reason: HOSPADM

## 2021-04-26 RX ORDER — DIPHENHYDRAMINE HYDROCHLORIDE 50 MG/ML
25 INJECTION INTRAMUSCULAR; INTRAVENOUS EVERY 6 HOURS PRN
Status: DISCONTINUED | OUTPATIENT
Start: 2021-04-26 | End: 2021-04-27 | Stop reason: HOSPADM

## 2021-04-26 RX ORDER — FENTANYL CITRATE 50 UG/ML
INJECTION, SOLUTION INTRAMUSCULAR; INTRAVENOUS PRN
Status: DISCONTINUED | OUTPATIENT
Start: 2021-04-26 | End: 2021-04-26

## 2021-04-26 RX ORDER — ONDANSETRON 2 MG/ML
4 INJECTION INTRAMUSCULAR; INTRAVENOUS EVERY 6 HOURS PRN
Status: DISCONTINUED | OUTPATIENT
Start: 2021-04-26 | End: 2021-04-27 | Stop reason: HOSPADM

## 2021-04-26 RX ORDER — BUSPIRONE HYDROCHLORIDE 15 MG/1
15 TABLET ORAL 2 TIMES DAILY
Status: DISCONTINUED | OUTPATIENT
Start: 2021-04-27 | End: 2021-04-27 | Stop reason: HOSPADM

## 2021-04-26 RX ORDER — HYOSCYAMINE SULFATE 0.125 MG
125 TABLET ORAL EVERY 4 HOURS PRN
Status: DISCONTINUED | OUTPATIENT
Start: 2021-04-26 | End: 2021-04-26

## 2021-04-26 RX ADMIN — Medication 2 G: at 08:00

## 2021-04-26 RX ADMIN — SUGAMMADEX 200 MG: 100 INJECTION, SOLUTION INTRAVENOUS at 09:03

## 2021-04-26 RX ADMIN — ROCURONIUM BROMIDE 20 MG: 10 INJECTION INTRAVENOUS at 08:41

## 2021-04-26 RX ADMIN — HYDROMORPHONE HYDROCHLORIDE 0.3 MG: 1 INJECTION, SOLUTION INTRAMUSCULAR; INTRAVENOUS; SUBCUTANEOUS at 09:52

## 2021-04-26 RX ADMIN — HYOSCYAMINE SULFATE 125 MCG: 0.12 TABLET ORAL at 11:48

## 2021-04-26 RX ADMIN — HYDROMORPHONE HYDROCHLORIDE 0.2 MG: 0.2 INJECTION, SOLUTION INTRAMUSCULAR; INTRAVENOUS; SUBCUTANEOUS at 16:28

## 2021-04-26 RX ADMIN — ROCURONIUM BROMIDE 50 MG: 10 INJECTION INTRAVENOUS at 07:45

## 2021-04-26 RX ADMIN — FENTANYL CITRATE 25 MCG: 50 INJECTION, SOLUTION INTRAMUSCULAR; INTRAVENOUS at 09:28

## 2021-04-26 RX ADMIN — PROPOFOL 40 MG: 10 INJECTION, EMULSION INTRAVENOUS at 07:46

## 2021-04-26 RX ADMIN — FENTANYL CITRATE 25 MCG: 50 INJECTION, SOLUTION INTRAMUSCULAR; INTRAVENOUS at 09:37

## 2021-04-26 RX ADMIN — FENTANYL CITRATE 100 MCG: 50 INJECTION, SOLUTION INTRAMUSCULAR; INTRAVENOUS at 08:24

## 2021-04-26 RX ADMIN — LIDOCAINE HYDROCHLORIDE 80 MG: 20 INJECTION, SOLUTION INFILTRATION; PERINEURAL at 07:45

## 2021-04-26 RX ADMIN — OXYCODONE HYDROCHLORIDE 5 MG: 5 TABLET ORAL at 20:02

## 2021-04-26 RX ADMIN — ONDANSETRON 4 MG: 2 INJECTION INTRAMUSCULAR; INTRAVENOUS at 09:03

## 2021-04-26 RX ADMIN — DEXAMETHASONE SODIUM PHOSPHATE 8 MG: 4 INJECTION, SOLUTION INTRA-ARTICULAR; INTRALESIONAL; INTRAMUSCULAR; INTRAVENOUS; SOFT TISSUE at 07:59

## 2021-04-26 RX ADMIN — OXYCODONE HYDROCHLORIDE 5 MG: 5 TABLET ORAL at 13:43

## 2021-04-26 RX ADMIN — HYOSCYAMINE SULFATE 125 MCG: 0.12 TABLET ORAL at 15:46

## 2021-04-26 RX ADMIN — SODIUM CHLORIDE, POTASSIUM CHLORIDE, SODIUM LACTATE AND CALCIUM CHLORIDE: 600; 310; 30; 20 INJECTION, SOLUTION INTRAVENOUS at 10:28

## 2021-04-26 RX ADMIN — ACETAMINOPHEN 1000 MG: 500 TABLET, FILM COATED ORAL at 14:08

## 2021-04-26 RX ADMIN — FENTANYL CITRATE 50 MCG: 50 INJECTION, SOLUTION INTRAMUSCULAR; INTRAVENOUS at 09:34

## 2021-04-26 RX ADMIN — HYDROMORPHONE HYDROCHLORIDE 0.2 MG: 0.2 INJECTION, SOLUTION INTRAMUSCULAR; INTRAVENOUS; SUBCUTANEOUS at 11:35

## 2021-04-26 RX ADMIN — HYOSCYAMINE SULFATE 125 MCG: 0.12 TABLET ORAL at 20:02

## 2021-04-26 RX ADMIN — HYDROMORPHONE HYDROCHLORIDE 0.2 MG: 0.2 INJECTION, SOLUTION INTRAMUSCULAR; INTRAVENOUS; SUBCUTANEOUS at 21:53

## 2021-04-26 RX ADMIN — PROPOFOL 160 MG: 10 INJECTION, EMULSION INTRAVENOUS at 07:45

## 2021-04-26 RX ADMIN — HYDROMORPHONE HYDROCHLORIDE 0.4 MG: 1 INJECTION, SOLUTION INTRAMUSCULAR; INTRAVENOUS; SUBCUTANEOUS at 10:20

## 2021-04-26 RX ADMIN — SODIUM CHLORIDE, POTASSIUM CHLORIDE, SODIUM LACTATE AND CALCIUM CHLORIDE: 600; 310; 30; 20 INJECTION, SOLUTION INTRAVENOUS at 07:37

## 2021-04-26 RX ADMIN — ENOXAPARIN SODIUM 40 MG: 40 INJECTION SUBCUTANEOUS at 06:50

## 2021-04-26 RX ADMIN — FENTANYL CITRATE 100 MCG: 50 INJECTION, SOLUTION INTRAMUSCULAR; INTRAVENOUS at 08:40

## 2021-04-26 RX ADMIN — FENTANYL CITRATE 50 MCG: 50 INJECTION, SOLUTION INTRAMUSCULAR; INTRAVENOUS at 09:49

## 2021-04-26 RX ADMIN — FENTANYL CITRATE 50 MCG: 50 INJECTION, SOLUTION INTRAMUSCULAR; INTRAVENOUS at 07:41

## 2021-04-26 ASSESSMENT — ACTIVITIES OF DAILY LIVING (ADL): ADLS_ACUITY_SCORE: 15

## 2021-04-26 ASSESSMENT — MIFFLIN-ST. JEOR: SCORE: 1460

## 2021-04-26 ASSESSMENT — LIFESTYLE VARIABLES: TOBACCO_USE: 0

## 2021-04-26 NOTE — BRIEF OP NOTE
[facility]    Brief Operative Note    Pre-operative diagnosis: Obesity [E66.9]  Post-operative diagnosis Same as pre-operative diagnosis    Procedure: Procedure(s):  GASTRECTOMY, SLEEVE, LAPAROSCOPIC  HERNIORRHAPHY, HIATAL, LAPAROSCOPIC  Surgeon: Surgeon(s) and Role:     * Davi Dee MD - Primary     * Jordan Mackey MD - Assisting     * Nereyda Wheatley MD - Resident - Assisting  Anesthesia: General   Estimated blood loss: 20cc  Drains: None  Specimens:   ID Type Source Tests Collected by Time Destination   A : Partial Gastrectomy  Tissue Other SURGICAL PATHOLOGY EXAM Davi Dee MD 4/26/2021  8:54 AM      Findings:   Normal appearing stomach.  Hiatal hernia.  Complications: None.  Implants: * No implants in log *    Nereyda Wheatley MD  Surgery Resident PGY-2  Pg [phone]

## 2021-04-26 NOTE — PROGRESS NOTES
"Surgery Post-Operative Note  4/26/2021   ID: 41 year old  POD#0 s/p laparoscopic sleeve gastrectomy    DZ: obesity    S: Describes L neck and shoulder pain. Is occassionally tearful but states this is likely due to anxiety as her pain is controlled with pain medication. Otherwise she denies N/V, CP, SOB.     O:  /75   Pulse 74   Temp 97.6  F (36.4  C) (Oral)   Resp 16   Ht 1.626 m (5' 4\")   Wt 82 kg (180 lb 12.4 oz)   LMP 06/05/2017   SpO2 97%   BMI 31.03 kg/m    GEN: Alert NAD  CV: non-cyanotic  PUL: breathing comfortably on RA  Abdomen: Soft, appropriately tender. Laparoscopic incisions w/ clean and dry dressings.  Extremities - no edema, non-tender, SCDs in place.    Lab Results   Component Value Date    HGB 14.0 02/17/2021    HGB 11.8 02/13/2019     A/P: 41 year old F w/ PMH of factor V leiden, LUE DVT 2017, PCOS, GERD, depression, anxity, obesity now POD0 s/p laparoscopic sleeve gastrectomy.  Progressing well. We discussed the likely reasoning for her shoulder pain being referred pain from her gastrectomy and insufflation. We will continue to monitor. She has some possible L neck soreness as well. I will order a heating pad, but she also has IV dilaudid available at this time.     -pain control w/ acetaminophen scheduled, prn dilaudid, oxycodone, heating pad ordered for neck  -bariatric clears  -home buspirone and vilazodone  -to start lovenox and aspirin tomorrow  -holding PTA alprazolam, fluconazole, metformin  -mIVF at 75  -anti-emetics available     Oscar Wyatt  PGY-1  [phone]        "

## 2021-04-26 NOTE — ANESTHESIA PREPROCEDURE EVALUATION
Anesthesia Pre-Procedure Evaluation    Patient: Aga Alanis   MRN: [id number] : 1980        Preoperative Diagnosis: Obesity [E66.9]   Procedure : Procedure(s):  GASTRECTOMY, SLEEVE, LAPAROSCOPIC  HERNIORRHAPHY, HIATAL, LAPAROSCOPIC     Past Medical History:   Diagnosis Date     Depression      DVT (deep venous thrombosis) (H)      Gastroesophageal reflux disease with esophagitis      Ovarian cyst       Past Surgical History:   Procedure Laterality Date     COSMETIC SURGERY      breast augmentation and tummy tu      ENT SURGERY      tubes      EXAM UNDER ANESTHESIA PELVIC N/A 2018    Procedure: Vaginal exam under anesthesia, repair of vaginal cuff;  Surgeon: Bev Fischer MD;  Location: [facility] OR     GYN SURGERY      ovarian cyst , tubes tied      HYSTERECTOMY       ORTHOPEDIC SURGERY      knee right      REPAIR LACERATION VAGINA N/A 2018    Procedure: Repair of vaginal laceration;  Surgeon: Bev Fischer MD;  Location: [facility] OR      Allergies   Allergen Reactions     Sulfa Drugs Anaphylaxis     Antibiotic, but unsure which one     Codeine Nausea and Vomiting     Codeine Sulfate Nausea and Vomiting     Demerol Nausea and Vomiting     Wheat Bran Other (See Comments)     Food allergy tested and confirmed by [facility]     Wheat Gluten  [Gluten Meal] Diarrhea, GI Disturbance and Nausea     Metronidazole Rash      Social History     Tobacco Use     Smoking status: Never Smoker     Smokeless tobacco: Never Used   Substance Use Topics     Alcohol use: Yes      Wt Readings from Last 1 Encounters:   21 82 kg (180 lb 12.4 oz)        Anesthesia Evaluation   Pt has had prior anesthetic.     History of anesthetic complications  - PONV.      ROS/MED HX  ENT/Pulmonary:     (+) sleep apnea (since recent weight loss, not using CPAP), doesn't use CPAP,  (-) tobacco use and asthma   Neurologic:  - neg neurologic ROS     Cardiovascular:  - neg cardiovascular ROS     METS/Exercise Tolerance: >4  METS    Hematologic: Comments: Heterozygous Factor V Leiden mutation      Musculoskeletal: Comment: Recent tailbone pain      GI/Hepatic:     (+) GERD,     Renal/Genitourinary:  - neg Renal ROS     Endo: Comment: PCOS    (+) Obesity,  (-) Type I DM, Type II DM and thyroid disease   Psychiatric/Substance Use:     (+) psychiatric history depression and anxiety     Infectious Disease:       Malignancy:  - neg malignancy ROS     Other:  - neg other ROS   (-) Any chance pregnant       Physical Exam    Airway        Mallampati: I   TM distance: > 3 FB   Neck ROM: full   Mouth opening: > 3 cm    Respiratory Devices and Support         Dental           Cardiovascular   cardiovascular exam normal          Pulmonary   pulmonary exam normal                OUTSIDE LABS:  CBC:   Lab Results   Component Value Date    WBC 4.8 02/17/2021    WBC 6.9 02/13/2019    HGB 14.0 02/17/2021    HGB 11.8 02/13/2019    HCT 42.2 02/17/2021    HCT 38.8 02/13/2019     02/17/2021     02/13/2019     BMP:   Lab Results   Component Value Date     02/17/2021     02/13/2019    POTASSIUM 4.2 02/17/2021    POTASSIUM 4.6 02/13/2019    CHLORIDE 105 02/17/2021    CHLORIDE 106 02/13/2019    CO2 29 02/17/2021    CO2 26 02/13/2019    BUN 11 02/17/2021    BUN 13 02/13/2019    CR 0.82 02/17/2021    CR 0.58 02/13/2019    GLC 87 02/17/2021    GLC 86 02/13/2019     COAGS: No results found for: PTT, INR, FIBR  POC:   Lab Results   Component Value Date    BGM 90 04/26/2021    HCG Negative 01/16/2010    HCGS Negative 05/02/2017     HEPATIC:   Lab Results   Component Value Date    ALBUMIN 3.9 02/17/2021    PROTTOTAL 8.0 02/17/2021    ALT 72 (H) 02/17/2021    AST 35 02/17/2021    ALKPHOS 58 02/17/2021    BILITOTAL 0.4 02/17/2021     OTHER:   Lab Results   Component Value Date    A1C 5.2 02/17/2021    SUMEET 9.6 02/17/2021       Anesthesia Plan    ASA Status:  2      Anesthesia Type: General.     - Airway: ETT   Induction: Intravenous.    Maintenance: Inhalation.        Consents    Anesthesia Plan(s) and associated risks, benefits, and realistic alternatives discussed. Questions answered and patient/representative(s) expressed understanding.     - Discussed with:  Patient         Postoperative Care    Pain management: Multi-modal analgesia.   PONV prophylaxis: Ondansetron (or other 5HT-3), Dexamethasone or Solumedrol     Comments:    H/o DVTs and clots around PIV after other procedures 2/2 FVL heterozygote. PIV placed in L hand to maximize collateral flow in the event of a clot around this IV.            Mary Kent MD

## 2021-04-26 NOTE — ANESTHESIA PROCEDURE NOTES
Airway       Patient location during procedure: OR  Staff -        Performed By: anesthesiologist  Consent for Airway        Urgency: elective  Indications and Patient Condition       Indications for airway management: angi-procedural       Induction type:intravenous       Mask difficulty assessment: 2 - vent by mask + OA or adjuvant +/- NMBA    Final Airway Details       Final airway type: endotracheal airway       Successful airway: ETT - single  Endotracheal Airway Details        ETT size (mm): 7.0       Cuffed: yes       Cuff volume (mL): 8       Successful intubation technique: video laryngoscopy       VL Blade Size: MAC D Blade (Unable to see larnyx with DL. Anterior with CMAC3. Grade 1 view with D blade. Recommend )       Grade View of Cords: 4 (Grade 1 view with CMAC  D blade)       Adjucts: stylet       Position: Right       Measured from: gums/teeth       Secured at (cm): 22    Post intubation assessment        Placement verified by: capnometry, equal breath sounds and chest rise        Number of other approaches attempted: 3 or more       Secured with: plastic tape       Ease of procedure: difficult       Dentition: Intact, Unchanged and Lips/oral mucosa injury (small cut upper lip)    Medication(s) Administered   Medication Administration Time: 4/26/2021 7:49 AM    Additional Comments       Anterior larynx. Recommend CMAC D blade for grade 1 view.

## 2021-04-26 NOTE — PHARMACY-CONSULT NOTE
Bariatric Consult    Medications evaluated as requested per Bariatric Consult. Patient may swallow tablets/capsules ONLY if less than   inch.  Larger tablets/capsules should be broken, crushed or split.    No medication changes were needed based on the Bariatric Medication Management Policy.     Patient is on metformin ER prior to admission, which cannot be split or crushed and has not been resumed here. When it is resumed, it can be changed to the immediate release at 1000 mg twice daily.    The pharmacist will continue to follow as new medications are ordered.    Poonam Fowler, PharmD, BCPS  4/26/2021 11:56 AM

## 2021-04-26 NOTE — OP NOTE
[facility]    Operative Note      Pre-operative diagnosis: Obesity [E66.9]  Post-operative diagnosis Same as pre-operative diagnosis    Procedure: Procedure(s):  GASTRECTOMY, SLEEVE, LAPAROSCOPIC  HERNIORRHAPHY, HIATAL, LAPAROSCOPIC  Surgeon: Surgeon(s) and Role:     * Davi Dee MD - Primary     * Jordan Mackey MD - Assisting     * Nereyda Wheatley MD - Resident - Assisting   Dr. Mackey was scrubbed and assisted in surgery due to the unavailability of a qualified surgical resident.      Anesthesia: General   Estimated blood loss: 20cc  Drains: None  Specimens:   ID Type Source Tests Collected by Time Destination   A : Partial Gastrectomy  Tissue Other SURGICAL PATHOLOGY EXAM Davi Dee MD 4/26/2021  8:54 AM      Findings:   Normal appearing stomach.  Medium-sized hiatal hernia (~4cm).  Complications: None.  Implants: * No implants in log *        BOUGIE SIZE: 40 FR  DISTANCE FROM PYLORUS: 10 CM  STAPLE LINE REINFORCEMENT: NO  STAPLE LINE OVERSEW: NO  COMORBIDITIES:   Past Medical History:   Diagnosis Date     Depression      DVT (deep venous thrombosis) (H)      Gastroesophageal reflux disease with esophagitis      Ovarian cyst        INDICATIONS FOR PROCEDURE  Aga Alanis is a 41 year old female who is morbidly obese.  Numerous weight loss attempts without surgery have been without success.     After understanding the risks and benefits of proceeding with a laparoscopic vertical sleeve gastrectomy, she agreed to an operation as outlined by me.    I reviewed the risks of surgery with Aga Alanis.    These include, but are not limited to, death, myocardial infarction, pneumonia, urinary tract infection, deep venous thrombosis with or without pulmonary embolus, abdominal infection from bowel injury or abscess, bowel obstruction, wound infection, and bleeding.    More specific risks related to vertical sleeve gastrectomy  "were detailed at the bariatric informational seminar and include the followin.) leak at the vertical sleeve staple line, 2.) stricture in the sleeve, 3.) nausea, vomiting, and dehydration for several months, 4.) adhesions causing bowel obstruction, 5.) rapid weight loss causing a higher rate of gallstone formation during the first 6 months after surgery, 6.) decreased absorption of vitamins because of the reduced stomach size, 7.) weight regain if inappropriate food intake occurs.    The BMI that we are treating this patient for was measured at the initial consultation visit in our bariatric program and it was: 38.0 kg/m2 (as calculated just below).      The initial consult height, weight, and BMI are as follows:    Height: 5' 4\"   BARIATRIC WEIGHT TRACKING 2020   Initial BMI 37.97   OLD Initial Weight -       Our weight loss surgery program requires weight loss prior to bariatric surgery and currently the height, weight, and BMI are as follows:    Height: 162.6 cm (5' 4\"), Weight: 82 kg (180 lb 12.4 oz), and currently the Body mass index is 31.03 kg/m .    Due to the patient's comorbidity conditions of gastroesophageal reflux disease, and obstructive sleep apnea in association with elevated body mass index, bariatric surgery has been recommended and is being performed today.    Moreover, as the surgeon performing this procedure, I certify that the following are true in regards to this patient at or prior to the day of surgery:    1. The patient's body mass index (BMI) is or has been greater than or equal to 35 kg/m2.  2. The patient has at least one co-morbidity related to obesity (as outlined above).  3. The patient has been previously unsuccessful with medical treatment for obesity.  Please note that some of this information has been documented as part of a comprehensive pre-bariatric surgery process in the outpatient clinic and is NOT immediately available in the inpatient encounter for this " bariatric operation.      OPERATIVE PROCEDURE:     Aga Alanis was brought to the operating room and prepared in routine fashion. Under the benefits of general anesthesia, a left upper quadrant Veress needle was inserted and pneumoperitoneum was established using carbon dioxide gas to a maximum pressure of 15 mmHg. A total of five ports were placed into the abdomen.     A liver retractor was placed through the rightmost port and this provided a view of the upper stomach. The operation was started by dividing the short gastric vessels off the greater curvature of the stomach. This dissection was carried up to the angle of His, and ligasure dissector was used for hemostasis.     A hiatal hernia repair was performed by crural approximation and a figure of eight of 2-0 surgidac was used to close the hiatus.    A bougie (size noted above) was passed into the stomach and I used 6 loads of the [employer] flex linear cutter stapler device to create a vertical sleeve gastrectomy with the bougie as a template. The bougie was removed.    A transabdominal properitoneal anesthetic block was performed using 30 ml 0.5% bupivicaine diluted with 90 ml saline (100 mL total); this was injected using 22gauge spinal needle into the properitoneal planes around the ports and laterally along the anterior axillary line under direct optical guidance. Some of the mixture was used to inject local anesthetic at the skin of each incision as well.    The sleeve gastrectomy specimen (partial gastrectomy) was now removed from the abdomen through the 15 mm port.    Hemostasis was secured, and the liver retractor and all ports were removed from the abdomen under direct visualization.     All needle and sponge counts were correct x2 at the end of the operation, and I was present for all critical components of the procedure.     Skin incisions were closed using skin staples, and sterile dressings were placed.     Davi Dee  MD  Surgery  [phone] (hospital )  [phone] (clinic nurses)

## 2021-04-26 NOTE — ANESTHESIA CARE TRANSFER NOTE
Patient: Aga Alanis    Procedure(s):  GASTRECTOMY, SLEEVE, LAPAROSCOPIC  HERNIORRHAPHY, HIATAL, LAPAROSCOPIC    Diagnosis: Obesity [E66.9]  Diagnosis Additional Information: No value filed.    Anesthesia Type:   General     Note:    Oropharynx: oropharynx clear of all foreign objects  Level of Consciousness: drowsy  Oxygen Supplementation: face mask  Level of Supplemental Oxygen (L/min / FiO2): 6  Independent Airway: airway patency satisfactory and stable  Dentition: dentition unchanged  Vital Signs Stable: post-procedure vital signs reviewed and stable  Report to RN Given: handoff report given  Patient transferred to: PACU    Handoff Report: Identifed the Patient, Identified the Reponsible Provider, Reviewed the pertinent medical history, Discussed the surgical course, Reviewed Intra-OP anesthesia mangement and issues during anesthesia, Set expectations for post-procedure period and Allowed opportunity for questions and acknowledgement of understanding      Vitals: (Last set prior to Anesthesia Care Transfer)  CRNA VITALS  4/26/2021 0849 - 4/26/2021 0919      4/26/2021             Pulse:  97    SpO2:  98 %        Electronically Signed By: MAITE Beckham CRNA  April 26, 2021  9:19 AM

## 2021-04-26 NOTE — ANESTHESIA POSTPROCEDURE EVALUATION
Patient: Aga Alanis    Procedure(s):  GASTRECTOMY, SLEEVE, LAPAROSCOPIC  HERNIORRHAPHY, HIATAL, LAPAROSCOPIC    Diagnosis:Obesity [E66.9]  Diagnosis Additional Information: No value filed.    Anesthesia Type:  General    Note:  Disposition: Admission   Postop Pain Control: Uneventful            Sign Out: Well controlled pain   PONV: No   Neuro/Psych: Uneventful            Sign Out: Acceptable/Baseline neuro status   Airway/Respiratory: Uneventful            Sign Out: Acceptable/Baseline resp. status   CV/Hemodynamics: Uneventful            Sign Out: Acceptable CV status; No obvious hypovolemia; No obvious fluid overload   Other NRE:    DID A NON-ROUTINE EVENT OCCUR? No           Last vitals:  Vitals:    04/26/21 1015 04/26/21 1030 04/26/21 1045   BP: (!) 139/97 (!) 133/92 (!) 133/93   Pulse: 82 70 71   Resp: 12 (!) 7 10   Temp:      SpO2: 98% 94% 95%       Last vitals prior to Anesthesia Care Transfer:  CRNA VITALS  4/26/2021 0846 - 4/26/2021 0946      4/26/2021             Pulse:  97    SpO2:  98 %          Electronically Signed By: Mary Kent MD  April 26, 2021  10:52 AM

## 2021-04-27 ENCOUNTER — MYC MEDICAL ADVICE (OUTPATIENT)
Dept: ENDOCRINOLOGY | Facility: CLINIC | Age: 41
End: 2021-04-27

## 2021-04-27 VITALS
OXYGEN SATURATION: 95 % | WEIGHT: 180.78 LBS | HEIGHT: 64 IN | SYSTOLIC BLOOD PRESSURE: 122 MMHG | HEART RATE: 102 BPM | DIASTOLIC BLOOD PRESSURE: 77 MMHG | RESPIRATION RATE: 18 BRPM | BODY MASS INDEX: 30.86 KG/M2 | TEMPERATURE: 97.4 F

## 2021-04-27 PROCEDURE — 250N000013 HC RX MED GY IP 250 OP 250 PS 637: Performed by: SURGERY

## 2021-04-27 PROCEDURE — 250N000013 HC RX MED GY IP 250 OP 250 PS 637: Performed by: STUDENT IN AN ORGANIZED HEALTH CARE EDUCATION/TRAINING PROGRAM

## 2021-04-27 PROCEDURE — 250N000011 HC RX IP 250 OP 636: Performed by: STUDENT IN AN ORGANIZED HEALTH CARE EDUCATION/TRAINING PROGRAM

## 2021-04-27 RX ORDER — SIMETHICONE 40MG/0.6ML
40 SUSPENSION, DROPS(FINAL DOSAGE FORM)(ML) ORAL EVERY 6 HOURS PRN
Status: DISCONTINUED | OUTPATIENT
Start: 2021-04-27 | End: 2021-04-27 | Stop reason: HOSPADM

## 2021-04-27 RX ORDER — HYOSCYAMINE SULFATE 0.125 MG
125 TABLET ORAL EVERY 4 HOURS PRN
Qty: 30 TABLET | Refills: 0 | Status: SHIPPED | OUTPATIENT
Start: 2021-04-27 | End: 2024-01-29

## 2021-04-27 RX ORDER — AMOXICILLIN 250 MG
2 CAPSULE ORAL 2 TIMES DAILY
Qty: 20 TABLET | Refills: 0 | Status: SHIPPED | OUTPATIENT
Start: 2021-04-27 | End: 2024-01-29

## 2021-04-27 RX ORDER — HYDROXYZINE HCL 10 MG/5 ML
10 SOLUTION, ORAL ORAL EVERY 4 HOURS PRN
Status: DISCONTINUED | OUTPATIENT
Start: 2021-04-27 | End: 2021-04-27 | Stop reason: HOSPADM

## 2021-04-27 RX ORDER — PROCHLORPERAZINE MALEATE 10 MG
10 TABLET ORAL EVERY 6 HOURS PRN
Qty: 12 TABLET | Refills: 0 | Status: SHIPPED | OUTPATIENT
Start: 2021-04-27 | End: 2024-01-29

## 2021-04-27 RX ORDER — ACETAMINOPHEN 325 MG/10.15ML
975 LIQUID ORAL EVERY 8 HOURS
Status: DISCONTINUED | OUTPATIENT
Start: 2021-04-27 | End: 2021-04-27 | Stop reason: HOSPADM

## 2021-04-27 RX ORDER — HYOSCYAMINE SULFATE 0.125 MG
250 TABLET ORAL 3 TIMES DAILY
Status: DISCONTINUED | OUTPATIENT
Start: 2021-04-27 | End: 2021-04-27 | Stop reason: HOSPADM

## 2021-04-27 RX ORDER — ONDANSETRON 4 MG/1
4 TABLET, ORALLY DISINTEGRATING ORAL EVERY 6 HOURS PRN
Qty: 15 TABLET | Refills: 0 | Status: SHIPPED | OUTPATIENT
Start: 2021-04-27 | End: 2024-01-29

## 2021-04-27 RX ORDER — OXYCODONE HYDROCHLORIDE 5 MG/1
5 TABLET ORAL EVERY 4 HOURS PRN
Qty: 12 TABLET | Refills: 0 | Status: SHIPPED | OUTPATIENT
Start: 2021-04-27 | End: 2024-01-29

## 2021-04-27 RX ORDER — ACETAMINOPHEN 500 MG
1000 TABLET ORAL EVERY 8 HOURS
Qty: 30 TABLET | Refills: 0 | Status: SHIPPED | OUTPATIENT
Start: 2021-04-27 | End: 2024-05-31

## 2021-04-27 RX ADMIN — HYDROXYZINE HYDROCHLORIDE 10 MG: 10 SOLUTION ORAL at 11:11

## 2021-04-27 RX ADMIN — HYOSCYAMINE SULFATE 125 MCG: 0.12 TABLET ORAL at 06:18

## 2021-04-27 RX ADMIN — ASPIRIN 81 MG: 81 TABLET, COATED ORAL at 08:32

## 2021-04-27 RX ADMIN — HYOSCYAMINE SULFATE 125 MCG: 0.12 TABLET ORAL at 00:10

## 2021-04-27 RX ADMIN — HYDROMORPHONE HYDROCHLORIDE 0.2 MG: 0.2 INJECTION, SOLUTION INTRAMUSCULAR; INTRAVENOUS; SUBCUTANEOUS at 05:03

## 2021-04-27 RX ADMIN — OXYCODONE HYDROCHLORIDE 5 MG: 5 TABLET ORAL at 13:09

## 2021-04-27 RX ADMIN — ONDANSETRON 4 MG: 2 INJECTION INTRAMUSCULAR; INTRAVENOUS at 08:30

## 2021-04-27 RX ADMIN — VILAZODONE HYDROCHLORIDE 40 MG: 40 TABLET ORAL at 08:31

## 2021-04-27 RX ADMIN — OXYCODONE HYDROCHLORIDE 10 MG: 5 TABLET ORAL at 08:24

## 2021-04-27 RX ADMIN — PROCHLORPERAZINE MALEATE 10 MG: 5 TABLET ORAL at 18:43

## 2021-04-27 RX ADMIN — ACETAMINOPHEN 975 MG: 325 SOLUTION ORAL at 13:04

## 2021-04-27 RX ADMIN — HYOSCYAMINE SULFATE 125 MCG: 0.12 TABLET ORAL at 12:25

## 2021-04-27 RX ADMIN — OXYCODONE HYDROCHLORIDE 10 MG: 5 TABLET ORAL at 03:33

## 2021-04-27 RX ADMIN — HYOSCYAMINE SULFATE 250 MCG: 0.12 TABLET ORAL at 15:52

## 2021-04-27 RX ADMIN — ENOXAPARIN SODIUM 40 MG: 100 INJECTION SUBCUTANEOUS at 08:33

## 2021-04-27 RX ADMIN — DOCUSATE SODIUM 50 MG AND SENNOSIDES 8.6 MG 2 TABLET: 8.6; 5 TABLET, FILM COATED ORAL at 08:32

## 2021-04-27 RX ADMIN — PROCHLORPERAZINE EDISYLATE 10 MG: 5 INJECTION INTRAMUSCULAR; INTRAVENOUS at 12:26

## 2021-04-27 RX ADMIN — BUSPIRONE HYDROCHLORIDE 15 MG: 15 TABLET ORAL at 08:32

## 2021-04-27 RX ADMIN — HYDROMORPHONE HYDROCHLORIDE 0.2 MG: 0.2 INJECTION, SOLUTION INTRAMUSCULAR; INTRAVENOUS; SUBCUTANEOUS at 02:29

## 2021-04-27 RX ADMIN — OXYCODONE HYDROCHLORIDE 5 MG: 5 TABLET ORAL at 18:43

## 2021-04-27 RX ADMIN — HYDROMORPHONE HYDROCHLORIDE 0.2 MG: 0.2 INJECTION, SOLUTION INTRAMUSCULAR; INTRAVENOUS; SUBCUTANEOUS at 00:10

## 2021-04-27 RX ADMIN — HYOSCYAMINE SULFATE 125 MCG: 0.12 TABLET ORAL at 08:33

## 2021-04-27 ASSESSMENT — ACTIVITIES OF DAILY LIVING (ADL)
ADLS_ACUITY_SCORE: 15

## 2021-04-27 NOTE — PLAN OF CARE
"/77 (BP Location: Right arm)   Pulse 102   Temp 97.4  F (36.3  C) (Oral)   Resp 18   Ht 1.626 m (5' 4\")   Wt 82 kg (180 lb 12.4 oz)   LMP 06/05/2017   SpO2 95%   BMI 31.03 kg/m      Neuro: A&Ox4. Anxious at times. Got atarax x1  Cardiac: AVSS.   Respiratory: Sating 95% on RA.  GI/: Voiding not saving. Belching, not passing gas. No BM  Diet/appetite: On Bariatric Full liquid diet. Appetite poor   Activity:  Up with SBA. Walked in halls x2 with    Pain: C/O abdomen cramping and gas pain in shoulder and neck. Gave oxycodone, zofran, levsin with little relief. Gave compazine with good effect   Skin: Abdomen lap sites x5 SINDY  LDA's: Has no iv access. PIV infiltrated.    Plan: Continue with POC. Notify primary team with changes.      "

## 2021-04-27 NOTE — PLAN OF CARE
Pt arrived to unit from PACU. Pain currently being controlled with PRN oxy and dilaudid. PT was able to ambulate in cabral with assist of one staff and . LR running @75 ml/hr. No c/o nausea, tolerating small sips of water and juice. Plan to transition to full liquid diet tomorrow morning as tolerated.

## 2021-04-27 NOTE — PROGRESS NOTES
GENERAL SURGERY PROGRESS NOTES    Name: Aga Alanis   : 1980   MRN: [id number]    SUBJECTIVE  - Persistent bilateral should pain. Minimal improvement w/ PRNs. Rates 8/10 currently. No sleep last night secondary to pain.  - Moderate crampy abdominal pain. Worse with any PO intake.  - No N&V.  - No BM since admission. Normal urination.    - Ambulating w/o difficulty.    OBJECTIVE     Temp: 98.6 F (24 hr max: 98.7 F)  HR: 81 bpm (24 hr range:  bpm)  RR: 16 rpm  BP: 197/85 mmHg (24 hr range: / mmHg)  SpO2: 95% on RA    Intake (24 hr): 940 mL (IV)  Output (24 hr): 300 mL (UO)     Physical Exam     General: NAD. Appears uncomfortable lying in bed.  Heart: RRR. No peripheral edema.  Respiratory: Breathing comfortably on RA.  Abdomen: Laparoscopic incisions w/ clean and dry dressings x5. Minimal tenderness diffuse tenderness to palpation.  Neuro: Alert and orientated.   Psych: Normal mood and affect. Normal speech.     Recent Labs:      2021 14:45   Creatinine 0.66      2021 14:45   Platelet Count 233     Surgical Pathology: pending     ASSESSMENT/PLAN    Aga Polk is a 40 y/o F w/ PMHx significant for Factor V Leiden, multiple DVT, PCOS, GERD, depression/anxiety, and obesity (BMI 31.03). Currently POD#1 s/p laparoscopic sleeve gastrectomy.     Plan:    - Advance bariatric diet to full liquids  - mIVF at 75, off with PO intake  - Continue home buspirone and vilazodone  - Start lovenox and aspirin (home Lovenox x2 weeks d/t Factor V Leiden)  - Hold PTA alprazolam, fluconazole, metformin  - Pain control   - Scheduled acetaminophen   - Prn oxycodone   - Heating pad   - PRN antiemetics  - Likely discharge later today    Dennis Tsai  Medical Student on General Surgery Service    Resident/Fellow Attestation  I, Melissa Patricio, was present with the medical/ANTONIA student who participated in the service and in the documentation of the note, edited above.  I have  verified the history and personally performed the physical exam and medical decision making.  I agree with the assessment and plan of care as documented in the note.        Melissa Patricio MD  PGY1  Date of Service (when I saw the patient): 04/27/21

## 2021-04-27 NOTE — TELEPHONE ENCOUNTER
Phone call to patient to assess symptoms.  Left message.  Patient is currently admitted for her gastric sleeve surgery.

## 2021-04-27 NOTE — PLAN OF CARE
"BP 97/61 (BP Location: Right arm)   Pulse 76   Temp 98.5  F (36.9  C) (Oral)   Resp 16   Ht 1.626 m (5' 4\")   Wt 82 kg (180 lb 12.4 oz)   LMP 06/05/2017   SpO2 95%   BMI 31.03 kg/m       Neuros:  A&Ox4. Able to make needs known.   Activity: SBA  Cardiac:  WNL. Denies cardiac chest pain   Respiratory:  Sating well on 2L NC. Denies SOB  Diet: Bariatric clears   GI/Gu:  Voiding without difficulty.No BM this shift. +BS  Denies N/V  Skin/Incisions: 6 abdominal lap sites covered with primapore- CDI  LDA: (L) PIV infusing LR at 75ml/hr   Pain: Abdominal pain and severe L shoulder pain. Heat applied to shoulder . Abdominal binder placed   PRN: levsin, oxy, dilaudid   Changes:  No acute changes this shift   Plan: Continue POC. Pain control. Possible discharge today     SAÚL GAITAN RN on 4/27/2021 at 1:15 AM   "

## 2021-04-28 ENCOUNTER — PATIENT OUTREACH (OUTPATIENT)
Dept: ENDOCRINOLOGY | Facility: CLINIC | Age: 41
End: 2021-04-28

## 2021-04-28 ENCOUNTER — PATIENT OUTREACH (OUTPATIENT)
Dept: CARE COORDINATION | Facility: CLINIC | Age: 41
End: 2021-04-28

## 2021-04-28 NOTE — PLAN OF CARE
DISCHARGE                         No discharge date for patient encounter.  ----------------------------------------------------------------------------  Discharged to: Home at 7pm  Via: private transportation  Accompanied by: Family  Discharge Instructions: Reviewed diet, activity, medications, follow up appointments, when to call the MD, aftercare instructions.  Prescriptions:  picked up meds from discharge pharmacy; medication list reviewed & sent with pt  Follow Up Appointments: arranged; information given  Belongings: All sent with pt  IV: d/c'd  Pt exhibits understanding of above discharge instructions; all questions answered.    Discharge Paperwork: Signed, copied, and sent home with patient.

## 2021-04-28 NOTE — PROGRESS NOTES
Aga Alanis is a patient of Dr. Shayne Dee that underwent sleeve gastrectomy approximately 2 days ago.  Attempted to contact patient via telephone for a status update and review post op teaching.  LM on VM to call office.  Await return call.      Of note:  Pathology:  Pending  Wound:  Steri-strips  Follow-up:  5/4/21 at 8AM with Trinh Davidson NP  Restrictions:  - No pushing, pulling or lifting greater than 10-15 lb for 4-6 weeks.  New medications:  Oxycodone, Zofran, Levsin, Omeprazole, Senna  Equipment/Supplies:  None

## 2021-04-28 NOTE — DISCHARGE SUMMARY
"[facility]   MIS Discharge Summary    Date of Admission: 4/26/2021  Date of Discharge: 4/27/2021    Admission Diagnosis:  1. Obesity [E66.9]    Discharge Diagnosis:  1. Same as above    Consultations:  PHARMACY BARIATRIC IP CONSULT  RESPIRATORY CARE IP CONSULT    Procedures:  Laparoscopic sleeve gastrectomy  Laparoscopic hiatal herniorrhaphy    Brief HPI:  Aga Alanis is a 40 y/o female w/ PMHx significant for Factor V Leiden, multiple DVT, PCOS, GERD, depression/anxiety, and obesity (BMI 31.03). Initially evaluated in bariatric clinic 02/2019. Weight was 215 lbs (BMI 37.89) at the time, and goal weight of 205 lbs set. In the interim, she has undergone appropriate medical evaluation, dietitian evaluation, as well as psychologic screening, and was deemed an appropriate candidate for bariatric surgery.     Hospital Course:  Admitted on 04/26/12. Underwent laparoscopic sleeve gastrectomy and laparoscopic hiatal hernia repair, without complication. Post-op course uncomplciated. Pain control required titration of her pain medicatoins, but ultimately she was comfortable with her pain levels by EOD POD1. Discharged on POD#1 in stable condition. Tolerating full liquid diet and pain control is appropriate with PRNs.  Will continue Lovenox x2 weeks, given history of Factor V Leiden and multiple prior DVT.    Discharge Physical Exam:  /77 (BP Location: Right arm)   Pulse 102   Temp 97.4  F (36.3  C) (Oral)   Resp 18   Ht 1.626 m (5' 4\")   Wt 82 kg (180 lb 12.4 oz)   LMP 06/05/2017   SpO2 95%   BMI 31.03 kg/m      Gen: NAD  Lungs: non-labored breathing  CV: regular rhythm, normal rate   Abd: obese, soft, nondistended, appropriately tender, incisions are c/d/i  Ext: no peripheral edema  Neuro: AOx3    Meds:       Review of your medicines      START taking      Dose / Directions   acetaminophen 500 MG tablet  Commonly known as: TYLENOL  Used for: S/P laparoscopic sleeve " gastrectomy      Dose: 1,000 mg  Take 2 tablets (1,000 mg) by mouth every 8 hours  Quantity: 30 tablet  Refills: 0     enoxaparin ANTICOAGULANT 40 MG/0.4ML syringe  Commonly known as: LOVENOX  Used for: S/P laparoscopic sleeve gastrectomy      Dose: 40 mg  Inject 0.4 mLs (40 mg) Subcutaneous every 24 hours for 14 days  Quantity: 5.6 mL  Refills: 0     hyoscyamine 0.125 MG tablet  Commonly known as: LEVSIN  Used for: S/P laparoscopic sleeve gastrectomy      Dose: 125 mcg  Take 1 tablet (125 mcg) by mouth every 4 hours as needed for cramping  Quantity: 30 tablet  Refills: 0     ondansetron 4 MG ODT tab  Commonly known as: ZOFRAN-ODT  Used for: S/P laparoscopic sleeve gastrectomy      Dose: 4 mg  Take 1 tablet (4 mg) by mouth every 6 hours as needed for nausea or vomiting  Quantity: 15 tablet  Refills: 0     oxyCODONE 5 MG tablet  Commonly known as: ROXICODONE  Used for: S/P laparoscopic sleeve gastrectomy      Dose: 5 mg  Take 1 tablet (5 mg) by mouth every 4 hours as needed for severe pain  Quantity: 12 tablet  Refills: 0     prochlorperazine 10 MG tablet  Commonly known as: COMPAZINE  Used for: S/P laparoscopic sleeve gastrectomy      Dose: 10 mg  Take 1 tablet (10 mg) by mouth every 6 hours as needed for nausea or vomiting  Quantity: 12 tablet  Refills: 0     senna-docusate 8.6-50 MG tablet  Commonly known as: SENOKOT-S/PERICOLACE  Used for: S/P laparoscopic sleeve gastrectomy      Dose: 2 tablet  Take 2 tablets by mouth 2 times daily  Quantity: 20 tablet  Refills: 0        CONTINUE these medicines which may have CHANGED, or have new prescriptions. If we are uncertain of the size of tablets/capsules you have at home, strength may be listed as something that might have changed.      Dose / Directions   ALPRAZolam 0.25 MG tablet  Commonly known as: XANAX  This may have changed: Another medication with the same name was removed. Continue taking this medication, and follow the directions you see here.      Dose: 0.25  mg  Take 0.25 mg by mouth nightly as needed for anxiety  Refills: 0     busPIRone 15 MG tablet  Commonly known as: BUSPAR  This may have changed: Another medication with the same name was removed. Continue taking this medication, and follow the directions you see here.      Dose: 15 mg  Take 15 mg by mouth 2 times daily  Refills: 0     omeprazole 20 MG DR capsule  Commonly known as: priLOSEC  This may have changed:     when to take this    reasons to take this  Used for: S/P laparoscopic sleeve gastrectomy      Dose: 20 mg  Take 1 capsule (20 mg) by mouth every morning (before breakfast)  Quantity: 30 capsule  Refills: 0     spironolactone 100 MG tablet  Commonly known as: ALDACTONE  This may have changed: Another medication with the same name was removed. Continue taking this medication, and follow the directions you see here.      Dose: 100 mg  Take 100 mg by mouth every morning  Refills: 0     valACYclovir 1000 mg tablet  Commonly known as: VALTREX  This may have changed: Another medication with the same name was removed. Continue taking this medication, and follow the directions you see here.  Used for: Acute deep vein thrombosis (DVT) of brachial vein of left upper extremity (H)      Dose: 2,000 mg  Take 2,000 mg by mouth as needed For 3 days  Refills: 0     Viibryd 40 MG Tabs tablet  This may have changed: Another medication with the same name was removed. Continue taking this medication, and follow the directions you see here.  Generic drug: vilazodone      Dose: 40 mg  Take 40 mg by mouth every morning  Refills: 0     ZyrTEC Allergy 10 MG tablet  This may have changed: Another medication with the same name was removed. Continue taking this medication, and follow the directions you see here.  Generic drug: cetirizine      Dose: 10 mg  Take 10 mg by mouth every morning  Refills: 0        CONTINUE these medicines which have NOT CHANGED      Dose / Directions   aspirin 81 MG EC tablet      Dose: 81 mg  Take 81  mg by mouth every morning  Refills: 0     Biotin 10 MG Tabs tablet  Used for: Acute deep vein thrombosis (DVT) of brachial vein of left upper extremity (H)      Dose: 10 mg  Take 10 mg by mouth every morning  Refills: 0     fluconazole 150 MG tablet  Commonly known as: DIFLUCAN      TAKE 1 TABLET BY MOUTH ONCE  Refills: 0     lactobacillus rhamnosus (GG) capsule  Used for: Acute deep vein thrombosis (DVT) of brachial vein of left upper extremity (H)      Dose: 1 capsule  Take 1 capsule by mouth every morning  Refills: 0     solriamfetol 75 MG Tabs  Generic drug: Solriamfetol HCl      Dose: 0.5 tablet  Take 0.5 tablets by mouth every morning  Refills: 0        STOP taking    cholecalciferol 25 mcg (1000 units) capsule  Commonly known as: VITAMIN D3        metFORMIN 500 MG 24 hr tablet  Commonly known as: GLUCOPHAGE-XR        METHYLFOLATE PO        nitroFURantoin macrocrystal-monohydrate 100 MG capsule  Commonly known as: MACROBID        Vitamin D3 50 MCG (2000 UT) Tabs              Where to get your medicines      These medications were sent to [facility] - [place], [place] - [address] [address]    Phone: [phone]     acetaminophen 500 MG tablet    enoxaparin ANTICOAGULANT 40 MG/0.4ML syringe    hyoscyamine 0.125 MG tablet    omeprazole 20 MG DR capsule    ondansetron 4 MG ODT tab    prochlorperazine 10 MG tablet    senna-docusate 8.6-50 MG tablet     Some of these will need a paper prescription and others can be bought over the counter. Ask your nurse if you have questions.    Bring a paper prescription for each of these medications    oxyCODONE 5 MG tablet         Additional instructions:  After Care     Future Labs/Procedures    Discharge Instructions     Comments:    After Bariatric Surgery Discharge Instructions  [facility]     Note: Ask your nurse to order your medications from the pharmacy. Be sure you have  your medications with you when you leave.  Diet on discharge  If surgery was with Dr Dee: post-op diet is bariatric clear liquids and then advance to full liquid diet on post op day #1 if cleared by surgical team  How much fluid should I drink?  Strive for 48-64 ounces daily.  Carry a water bottle with you without a straw or sports top. Drink from it often.  Keep track of how much fluid you drink in a day.  Remember:  -Do not use straws, chew gum or suck on hard candies. They may cause painful gas.    -Sip, don't slurp when you drink.    -Practice small sips using a medicine cup for the first week postop.   -No ice or cold drinks. This could cause gas or spasms.   -No coffee, soda pop or drinks with caffeine. These may cause stomach pain.   -No alcohol. It is bad for your liver and will cause stomach pain.    How often should I do my deep breathing and coughing?  Use your incentive spirometer (small plastic breathing device) every hour while awake after you get home. Using the incentive spirometer helps you deep breath. Continuing to cough and deep breath will help prevent fevers and pneumonia.   If you do not have a fever after one week, you can stop using the incentive spirometer and discard it.     You can continue to take deep breaths without the incentive spirometer every one to two hours while awake for the month after surgery.    What kinds of activity can I do?  Get plenty of rest the first few days after surgery and try to balance rest and activity. You will need some time to recover - you may be more tired than you realize at first. You'll feel better and heal faster if you take good care of yourself.  For 4 weeks after surgery (Please review restrictions at your one week visit, they could change based on how well you are doing):  -Don't lift more than 20 pounds.   -Take 4-5 short walks every day.  -Don't jog, run, or do belly exercises.  Don't swim, bathe or use a hot tub until your cuts are healed  (scabs are gone).  You may shower  Don't plan to fly or take a road trip within the first 1 to 3 months after surgery.  You could get a blood clot in your legs. If you must travel, get up and move around every hour for at least 5 minutes before continuing on your journey.  Your care team can help you decide when it's safe for you to travel.     What can I do for pain control?  You had major belly surgery that involves all layers of your belly muscles. Pain is expected, even for some as far out as 6-8 weeks after surgery. Moving, sneezing, coughing, and breathing will cause discomfort because these activities use your belly muscles.   Please see your after visit summary medication review for what pain medication will be continued, discontinued and newly started for you.    You can take opioid pain medicine if prescribed and if needed. Try to wean off from it as soon as you feel comfortable.   Do not drive while you are taking opioid pain medicine. This is dangerous.  You can take acetaminophen (Tylenol) between your prescribed pain medicine on a scheduled basis OR take it scheduled every 6 hours (check with your care team for specifics).  -Acetaminophen formulation options:    -Liquid   -Caplet (Cut caplet in half before taking)   -Do not take more than 3000 mg Tylenol in a 24 hour period.  -You may also take Tylenol for pain in place of the opioid pain medicine (check with your care team for specifics).   You can apply ice or heat to the affected area(s). Just remember to wrap the ice in something and limit icing sessions to 20 minutes. Excessive icing can irritate the skin or cause skin damage.  You can apply heat with a hot, wet towel or heating pad. Just like cold therapy, limit heat application to 20 minutes. Never sleep with a heating pad on. It could cause severe burns to your skin.  Wear your binder to support your belly muscles if you have one.  Take this off a little more each day and try to be off  completely by 2 weeks after surgery. If you don't need your binder for comfort or support, you don't need to wear it.   You may not be able to sleep in a comfortable position for a few weeks after surgery. This is normal. You may be more comfortable sleeping in a recliner or propped up with 3 pillows for the first couple of weeks after surgery.  Do not take NSAID's (Non-Steroidal Anti-Inflammatory Drugs) (examples: ibuprofen, Motrin, Advil, Aleve, and Naproxen), aspirin, or use pain patches with NSAID's. They will increase your risk of bleeding or getting an ulcer.    Please call the clinic for any of the following pain concerns, we would like to talk to you:  -pain that does not improve with rest  -pain that gets worse and worse  -pain that is not controlled by your pain medicine  -a sudden severe increase in pain    What medications will I need to take after surgery?  You may be discharged with the following types of medications: omeprazole 20 mg once daily for heartburn symptom prevention, zofran as needed for nausea and a medication called hyoscyamine (levsin) as needed for cramping.Please see your after visit summary medication review for what will be continued, discontinued and newly started.  It is important to reduce the amount of acid in your new stomach for a couple of months after surgery while it is still healing. We will prescribe an acid reducer or antacid. Take it as directed. This will help prevent ulcers, heartburn and acid reflux.  If you took an acid reducer before you had surgery, your care team will let you know what acid reducer you will take after surgery.   It is okay to swallow any medications smaller than   inch in size.   -If it is larger than   inch, it may need to be cut, crushed, or in a liquid form. Check with your care team about which way is most appropriate for you and your medications.    What should I know about my incisions (cuts)?  Your incisions are covered with white steri  strips or butterfly tape and have band aids or gauze over the top. If you have gauze or band aids, they can come off in the hospital.  Leave your steri strips on until they fall off on their own. If the steri strips don't fall off after 1 to 2 weeks, you can take them off. If they fall off earlier, replace them with clean band aids trying to avoid touching the incision itself.   If you have gauze covered by a clear dressing, remove 2-3 days after surgery or as directed by your care team.  You may shower in the hospital after surgery and can get your incision coverings wet.  Do not submerge in water (e.g. No baths, swimming pools, hot tubs) until your care team tells you it is okay and your incisions are completely healed.    Call the clinic if you have any of these signs or symptoms of infection:  -Redness around the site.  -Drainage that smells bad.  -Drainage that is thick yellow or green.  -An increase in pain around the incision site  -An increase in swelling around the incision site  -Heat or warmth around incision site   -Fever of 101.5  F (38.3  C) or higher when taken under the tongue.    -Chills    Will my urine or bowel movements change?   Your first bowel movements (stools) will likely be liquid. You may also notice old blood or a darker color (black or maroon color) in your bowel movements.  This is not unusual and usually goes away after the first week, if not sooner. You may not have a bowel movement for a week.   If you have not had a bowel movement for at least three days after your surgery date and are passing gas, you can use over the counter stool softeners.  Please stop taking the stool softeners and laxatives if your stools are loose.  Increasing fluids and activity as well as getting off narcotics will help prevent constipation.    Call the clinic if:   -You have stomach pain.   -You continue to have constipation.  -You have excessive bloating after walking and passing gas.    How can I prevent  "dehydration if I feel nauseated (sick to my stomach) and vomit (throw up)?   Vomiting is not normal after surgery. If you continue to have nausea and vomiting, call the clinic.   Nausea can be a sign of dehydration. That is why it is very important to track your fluids.  Do not nap more than one hour during the day. Set a timer to wake yourself up, if needed. Too much sleep will keep you from drinking enough fluid during the day and lead to dehydration.  No outside activity in hot, humid weather until you can drink 48 to 64 ounces of fluid in 24 hours. If you sweat a lot, your body may lose too much water.  Try to take a 1 ounce sip of water (one medicine cup) every 15 minutes.  Set a timer to remind yourself.    Call the clinic if you have any of these signs or symptoms of dehydration:  -Dark colored urine  -Urinating (pass water) less than 2-3 times per day  -Lack of energy  -Nausea  -Dizziness  -Headache    Call the clinic ANY TIME at [phone] if:  -Your pain medicine is not working.  -You have a fever = 101.5 F.  -You have belly or left shoulder pain that gets worse and worse.  -You have a swollen leg with redness, warmth, or pain behind the knee or calf.  -You have chest pain   -You feel very short of breath.  -You have a sudden severe increase in heart rate.  -You have vomiting that gets worse and worse.  -You have constant nausea (feeling sick to your stomach) that does not go away with medication.  -You have trouble swallowing.  -You have an increasing feeling that \"something is not right\".  -You have hiccups that do not stop.  -You have any questions or concerns.    AFTER HOURS QUESTIONS OR CONCERNS: Call [phone] and ask to speak with surgery resident if you are having troubles in the evenings, at night, or on weekends. Please call if you experience increasing abdominal pain, nausea, vomiting, increasing drainage from your wounds, chills, or fever >101.5    If you have to go to the Emergency Room, " we prefer you go to the hospital that did your surgery. Please let them know that you had bariatric surgery and to notify your surgeon.    When should I go back to the clinic?  Follow up with your care team in 1-2 weeks.   If this appointment was not already made, please call: [phone]    Appointments located at [facility] clinic:  [facility] ([facility] [place]    [address]  [place] [address]        Follow Up:  - as above    BARIATRIC PATIENTS:  Call [phone] to schedule or to reach your care team    Nereyda Wheatley MD  Surgery Resident PGY-2  Pg [phone]

## 2021-04-29 ENCOUNTER — PATIENT OUTREACH (OUTPATIENT)
Dept: ENDOCRINOLOGY | Facility: CLINIC | Age: 41
End: 2021-04-29

## 2021-04-29 LAB — COPATH REPORT: NORMAL

## 2021-04-29 NOTE — PROGRESS NOTES
RN Post-Op/Post-Discharge Care Coordination Note    Ms. Aga Alanis is a 41 year old female who underwent laparoscopic gastric sleeve on 4/26/21 with  Dr. Shayne Dee.  Spoke with Patient.    Support  Patient able to care for self independently     Health Status  Fevers/chills: Patient denies any fever or chills.    Nausea/Vomiting: Patient denies nausea/vomiting.    Eating/drinking: Tolerating full liquid diet. Reminded to drink small sips slowly    Fluid Ounces per day: 30 ounces yesterday.  Set a timer for every 15 minutes and is doing better today.  Warmer fluids are going down better.    Bowel habits: Patient reports having a normal bowel movement.    Activity: walking,  Has tried some gentle yoga poses for relieving gas.    Breathing: Reminded patient to use incentive spirometer- 5 to 10 deep breaths each hour while awake.    Other symptoms: Tachycardia: no, Dizziness/lightheadedness: no, Shortness of breath, dyspnea with exertion, leg pain/swelling: no.      Drains (JESSICA): N/A    Incisions: Patient denies any signs and symptoms of infection..  Wound closure:  Steri-strips    Pain: Rates pain a 5 on a 10 Scale.  IExtra Strength Tylenol.  Encouraged non-medication management modalities: Heating pad, with barrier, to abdomen    New Medications:  Omeprazole (opening capsules), Levsin, Zofran, Oxycodone, Tylenol, Senna, home medications and patient was reminded not to take NSAIDS. Patient states she had not taken the Omeprazole.  Reminded to take it daily and to open capsules.    Activity/Restrictions  No lifting in excess of 20 pounds for 4 weeks    Pathology reviewed with patient:  Yes    Forms/Letters  No. All forms should be faxed to [phone].  Patient is planning on being off work for 1 week    Additional Questions: All of her questions were answered including reviewing diet, restrictions, and wound care.  She will call this office if she has any further questions and/or concerns.      Post op  appointment on May 04 at 8:00 AM confirmed with the patient:  Yes.    Whom and When to Call  Nurses: [phone]  Fax: [phone]     Patient advised if any concerns outside of clinic hours, to call hospital at [phone] and ask to speak to on-call bariatric resident. They should present to ED at [facility] to be assessed if urgency arises.      Risk for:  urinary tract infection, pneumonia, leg clots (deep vein thrombosis), lung clots (pulmonary emboli), injury to the bowels or other organs, bowel obstruction, hernia, and gastrointestinal bleeding.      Weight loss surgery side effects:  abdominal pain, cramping, bloating, difficulty swallowing, constipation, nausea, vomiting, diarrhea, frequent loose stools, dehydration, hair loss, excess skin, protein, iron and vitamin deficiencies, malnutrition, fatigue, and heartburn.    Bariatric surgery risks may include:  an internal leak at the staple line, narrowing of the esophagus, stomach or intestines (strictures), gallstones, bowel obstruction, inflammation of the esophagus or stomach, ulcers with or without bleeding, injuries to other organs, hernias, and iron deficiency.    Sleeve gastrectomy side effects:  leaks are more common after this procedure.  Risks include:  internal leak at the vertical sleeve staple line; nausea, vomiting, and dehydration for several months; bowel obstruction; gallstones during the first 6 months related to rapid weight loss; decreased absorption of vitamins and protein because of the reduced stomach size; weight regain if you increase food intake; narrowing of stomach, esophagus or intestines (stricture); injury to other organs; hernia; and ulcers.

## 2021-05-02 NOTE — PROGRESS NOTES
"Aga Alanis is a 41 year old female who is being evaluated via a billable video visit.      The patient has been notified of following:     \"This video visit will be conducted via a call between you and your physician/provider. We have found that certain health care needs can be provided without the need for an in-person physical exam.  This service lets us provide the care you need with a video conversation.  If a prescription is necessary we can send it directly to your pharmacy.  If lab work is needed we can place an order for that and you can then stop by our lab to have the test done at a later time.    Video visits are billed at different rates depending on your insurance coverage.  Please reach out to your insurance provider with any questions.    If during the course of the call the physician/provider feels a video visit is not appropriate, you will not be charged for this service.\"    Patient has given verbal consent for Video visit? Yes  How would you like to obtain your AVS? MyChart  If you are dropped from the video visit, the video invite should be resent to: Text to cell phone: [phone]  Will anyone else be joining your video visit? No        Video-Visit Details    Type of service:  Video Visit    Video Start Time: 8:29 AM   Video End Time: 9:05 AM    Originating Location (pt. Location): Home    Distant Location (provider location):  [facility] [place]     Platform used for Video Visit: [employer]    During this virtual visit the patient is located in [place], patient verifies this as the location during the entirety of this visit.     Nutrition Assessment  Reason For Visit:  Aga Alanis is a 41 year old female presenting today for nutrition follow-up, 1 week s/p sleeve gastrectomy with Dr. Dee. Patient referred by Trinh Davidson NP on May 4, 2021.    Anthropometrics  Initial Consult Weight: 215 lbs  Day of Surgery Weight(4/26/2021): 180.8 " lbs  Current Weight: 171.2 lbs  Weight loss: -43.8 lbs from initial consult; -9.6 lbs from day of surgery    Current Vitamins/Minerals: None at this time     Nutrition History  Pt reports consuming and tolerating bariatric clear and low-fat full liquid diets. Fluid intake appears adequate/low, drinking 40-50 oz of fluids per day. Warm/hot liquids are sitting better than cold liquids, she is having a lot of soup, broth and tea. Notices some leg cramping and a tightness in her calve, writer encourage to continue to take a sip of fluids every 10-15 minutes.     Have a lot of pressure and belching, she is taking the omeprozole daily and is not using drinking straws. May be bringing in more into the stomach with small sips.     Getting at least two scoops of Genepro in per day = 60 g protein equivalent.     Nutrition Prescription:  Grams Protein: 60 (minimum)  Amount of Fluid: 48-64 oz      Nutrition Diagnosis  Food and nutrition-related knowledge deficit r/t lack of prior exposure to diet advancements beyond bariatric low-fat full liquid diet aeb pt unable to verbalize understanding of bariatric pureed and soft diets.    Intervention  Intervention At Appointment:  Materials/education provided on bariatric pureed and soft diets, protein intake, fluid intake, eating pace, portion control, avoiding excess sugar and fat, recommended vitamin/mineral supplements. Patient demonstrates understanding.   - AVS via [employer]     Expected Engagement: Good     Goals:  1) Follow bariatric low-fat full liquid diet through day 13 post-op, then to progress to pureed diet x 2 weeks(5/10-5/23).  If tolerating, may advance on day 29 post-op to bariatric soft diet (5/24- 6/24).   2) Work towards 60 gm protein/day.  3) Consume 48-64 oz fluids daily- between meals.  4) Eat slowly (>20 min/meal), chewing well to smooth consistency once on the bariatric soft diet.  5) Limit portions to 1/2 cup/meal.  6) Start chewable/liquid multivitamin/minerals  twice daily.    Chewable Multivitamin Options for After Surgery:  Bariatric Advantage Advanced Multi EA chewable (https://www.bariatricadvantage.com/item/chewable-advanced-multi-ea)  - Discount code: UOFMINN (for 15% off order) and NEW10 (additional 10% off for new customers)   Avalon Solutions Group Multi Complete 45 (https://Global Wine Export/products/jkluo-xxmnisat-27?uxthhdh=03506479270637)  Flinstone's Complete, or generic (with 18 mg iron per chew) (https://www.target.com/p/kids-39-complete-multivitamin-chewable-tablets-orange-grape-38-cherry-150ct-up-38-up-8482/-/A-83294658#lnk=sametab)     Post-op Diet Handouts:  Diet Guidelines after Weight-loss Surgery  [email or web address]     Your Stage 1 Diet: Clear Liquids  [email or web address]     Your Stage 2 Diet: Low-fat Full Liquids (4/26-5/9)  [email or web address]://[email or web address]     Your Stage 3 Diet: Pureed Foods (5/10-5/23)  [email or web address]://[email or web address]     Pureed Pleasures  [email or web address]://[email or web address]    Your Stage 4 Diet: Soft Foods (5/24- 6/24)  [email or web address]://[email or web address]    Your Stage 5 Diet: Regular Foods  [email or web address]    Supplements after Weight Loss Surgery  [email or web address]://[email or web address]     Keeping Track of Fluids  [email or web address]    Exercise Guidelines after Weight Loss Surgery (1st 4-6 weeks)  [email or web address]      Follow-Up: 3 weeks     Time spent with patient: 36 minutes.  Leila Garces, MS, RD, LD

## 2021-05-03 ENCOUNTER — VIRTUAL VISIT (OUTPATIENT)
Dept: ENDOCRINOLOGY | Facility: CLINIC | Age: 41
End: 2021-05-03
Payer: COMMERCIAL

## 2021-05-03 DIAGNOSIS — Z71.3 NUTRITIONAL COUNSELING: ICD-10-CM

## 2021-05-03 DIAGNOSIS — Z98.84 S/P LAPAROSCOPIC SLEEVE GASTRECTOMY: Primary | ICD-10-CM

## 2021-05-03 DIAGNOSIS — E66.09 CLASS 1 OBESITY DUE TO EXCESS CALORIES WITH SERIOUS COMORBIDITY AND BODY MASS INDEX (BMI) OF 31.0 TO 31.9 IN ADULT: ICD-10-CM

## 2021-05-03 DIAGNOSIS — E66.811 CLASS 1 OBESITY DUE TO EXCESS CALORIES WITH SERIOUS COMORBIDITY AND BODY MASS INDEX (BMI) OF 31.0 TO 31.9 IN ADULT: ICD-10-CM

## 2021-05-03 PROCEDURE — 97803 MED NUTRITION INDIV SUBSEQ: CPT | Mod: GT | Performed by: DIETITIAN, REGISTERED

## 2021-05-03 NOTE — LETTER
"5/3/2021       RE: Aga Alanis  [address] [address] [address]     Dear Colleague,    Thank you for referring your patient, Aga Alanis, to the [facility] [place] at [facility]. Please see a copy of my visit note below.    Aga Alanis is a 41 year old female who is being evaluated via a billable video visit.      The patient has been notified of following:     \"This video visit will be conducted via a call between you and your physician/provider. We have found that certain health care needs can be provided without the need for an in-person physical exam.  This service lets us provide the care you need with a video conversation.  If a prescription is necessary we can send it directly to your pharmacy.  If lab work is needed we can place an order for that and you can then stop by our lab to have the test done at a later time.    Video visits are billed at different rates depending on your insurance coverage.  Please reach out to your insurance provider with any questions.    If during the course of the call the physician/provider feels a video visit is not appropriate, you will not be charged for this service.\"    Patient has given verbal consent for Video visit? Yes  How would you like to obtain your AVS? MyChart  If you are dropped from the video visit, the video invite should be resent to: Text to cell phone: [phone]  Will anyone else be joining your video visit? No        Video-Visit Details    Type of service:  Video Visit    Video Start Time: 8:29 AM   Video End Time: 9:05 AM    Originating Location (pt. Location): Home    Distant Location (provider location):  [facility]     Platform used for Video Visit: [employer]    During this virtual visit the patient is located in [place], patient verifies this as the location during the entirety of " this visit.     Nutrition Assessment  Reason For Visit:  Aga Alanis is a 41 year old female presenting today for nutrition follow-up, 1 week s/p sleeve gastrectomy with Dr. Dee. Patient referred by Trinh Davidson NP on May 4, 2021.    Anthropometrics  Initial Consult Weight: 215 lbs  Day of Surgery Weight(4/26/2021): 180.8 lbs  Current Weight: 171.2 lbs  Weight loss: -43.8 lbs from initial consult; -9.6 lbs from day of surgery    Current Vitamins/Minerals: None at this time     Nutrition History  Pt reports consuming and tolerating bariatric clear and low-fat full liquid diets. Fluid intake appears adequate/low, drinking 40-50 oz of fluids per day. Warm/hot liquids are sitting better than cold liquids, she is having a lot of soup, broth and tea. Notices some leg cramping and a tightness in her calve, writer encourage to continue to take a sip of fluids every 10-15 minutes.     Have a lot of pressure and belching, she is taking the omeprozole daily and is not using drinking straws. May be bringing in more into the stomach with small sips.     Getting at least two scoops of Genepro in per day = 60 g protein equivalent.     Nutrition Prescription:  Grams Protein: 60 (minimum)  Amount of Fluid: 48-64 oz      Nutrition Diagnosis  Food and nutrition-related knowledge deficit r/t lack of prior exposure to diet advancements beyond bariatric low-fat full liquid diet aeb pt unable to verbalize understanding of bariatric pureed and soft diets.    Intervention  Intervention At Appointment:  Materials/education provided on bariatric pureed and soft diets, protein intake, fluid intake, eating pace, portion control, avoiding excess sugar and fat, recommended vitamin/mineral supplements. Patient demonstrates understanding.   - AVS via [facility]     Expected Engagement: Good     Goals:  1) Follow bariatric low-fat full liquid diet through day 13 post-op, then to progress to pureed diet x 2 weeks(5/10-5/23).  If  tolerating, may advance on day 29 post-op to bariatric soft diet (5/24- 6/24).   2) Work towards 60 gm protein/day.  3) Consume 48-64 oz fluids daily- between meals.  4) Eat slowly (>20 min/meal), chewing well to smooth consistency once on the bariatric soft diet.  5) Limit portions to 1/2 cup/meal.  6) Start chewable/liquid multivitamin/minerals twice daily.    Chewable Multivitamin Options for After Surgery:  Bariatric Advantage Advanced Multi EA chewable (https://www.bariatricClearMomentum.Gleanster Research/item/chewable-advanced-multi-ea)  - Discount code: UOFMINN (for 15% off order) and NEW10 (additional 10% off for new customers)   Kudoala Multi Complete 45 (https://Lifestyle Air/products/lofwn-xvhdlsds-65?rcpsqvg=[email or web address])  Flinstone's Complete, or generic (with 18 mg iron per chew) (https://www.target.com/p/kids-39-complete-multivitamin-chewable-tablets-orange-grape-38-cherry-150ct-up-38-up-8482/-/A-19496757#lnk=sametab)     Post-op Diet Handouts:  Diet Guidelines after Weight-loss Surgery  [email or web address]     Your Stage 1 Diet: Clear Liquids  [email or web address]     Your Stage 2 Diet: Low-fat Full Liquids (4/26-5/9)  [email or web address]://[email or web address]     Your Stage 3 Diet: Pureed Foods (5/10-5/23)  [email or web address]     Pureed Pleasures  [email or web address]    Your Stage 4 Diet: Soft Foods (5/24- 6/24)  [email or web address]    Your Stage 5 Diet: Regular Foods  [email or web address]    Supplements after Weight Loss Surgery  [email or web address]     Keeping Track of Fluids  [email or web address]    Exercise Guidelines after Weight Loss Surgery (1st 4-6 weeks)  [email or web address]://[email or web address] Research/866779.pdf      Follow-Up: 3 weeks     Time spent with patient: 36 minutes.  Leila Garces, MS, RD, LD

## 2021-05-03 NOTE — PATIENT INSTRUCTIONS
Goals:  1) Follow bariatric low-fat full liquid diet through day 13 post-op, then to progress to pureed diet x 2 weeks(5/10-5/23).  If tolerating, may advance on day 29 post-op to bariatric soft diet (5/24- 6/24).   2) Work towards 60 gm protein/day.  3) Consume 48-64 oz fluids daily- between meals.  4) Eat slowly (>20 min/meal), chewing well to smooth consistency once on the bariatric soft diet.  5) Limit portions to 1/2 cup/meal.  6) Start chewable/liquid multivitamin/minerals twice daily.    Chewable Multivitamin Options for After Surgery:  Bariatric Advantage Advanced Multi EA chewable (https://www.Atigeo.Allotrope Partners/item/chewable-advanced-multi-ea)  - Discount code: UOFMINN (for 15% off order) and NEW10 (additional 10% off for new customers)   Smarterer Multi Complete 45 (https://PurePredictive/products/gxyqr-ncvbevbw-62?kymvajl=[id number])  Flinstone's Complete, or generic (with 18 mg iron per chew) (https://www.KnowledgeVision.Allotrope Partners/p/kids-39-complete-multivitamin-chewable-tablets-orange-grape-38-cherry-150ct-up-38-up-8482/-/A-94070987#lnk=sametab)     Post-op Diet Handouts:  Diet Guidelines after Weight-loss Surgery  [email or web address]     Your Stage 1 Diet: Clear Liquids  [email or web address]     Your Stage 2 Diet: Low-fat Full Liquids (4/26-5/9)  [email or web address]://[email or web address]     Your Stage 3 Diet: Pureed Foods (5/10-5/23)  [email or web address]://[email or web address]     Pureed Pleasures  [email or web address]://[email or web address]/[email or web address]    Your Stage 4 Diet: Soft Foods (5/24- 6/24)  [email or web address]://[email or web address]    Your Stage 5 Diet: Regular Foods  [email or web address]    Supplements after Weight Loss Surgery  [email or web address]://[email or web address]     Keeping Track of Fluids  [email or web address]    Exercise Guidelines after Weight Loss Surgery (1st 4-6 weeks)  [email or web address]

## 2021-05-05 ENCOUNTER — HOSPITAL ENCOUNTER (EMERGENCY)
Facility: CLINIC | Age: 41
Discharge: HOME | End: 2021-05-05
Attending: EMERGENCY MEDICINE | Admitting: EMERGENCY MEDICINE
Payer: COMMERCIAL

## 2021-05-05 VITALS
DIASTOLIC BLOOD PRESSURE: 73 MMHG | HEART RATE: 89 BPM | SYSTOLIC BLOOD PRESSURE: 110 MMHG | OXYGEN SATURATION: 96 % | TEMPERATURE: 98.1 F

## 2021-05-05 DIAGNOSIS — I82.612 SUPERFICIAL VENOUS THROMBOSIS OF ARM, LEFT: ICD-10-CM

## 2021-05-05 PROCEDURE — 99283 EMERGENCY DEPT VISIT LOW MDM: CPT | Performed by: EMERGENCY MEDICINE

## 2021-05-06 NOTE — ED TRIAGE NOTES
Pt arrives with concerns of a possible blood clot on her L wrist that started a few hours ago this evening. She recently had surgery on 4/23/21 here. She has a hx of blood clots, with three of them being in the L arm. She says it is painful and warm to the touch. She is currently on lovenox from her surgery

## 2021-05-06 NOTE — ED PROVIDER NOTES
ED Provider Note  [facility]      History     Chief Complaint   Patient presents with     Wrist Pain     HPI  Aga Alanis is a 41 year old female who presents emergency room with discomfort to the dorsal aspect of her left wrist.  Patient states that she just had a surgery and this evening she started developing some discomfort to the posterior aspect of her wrist.  She states that that is where she had her IV and that she has a history of blood clots.  She is currently on Lovenox for blood clots in her left upper arm.  She otherwise denies any other significant complaints at this time.    Past Medical History  Past Medical History:   Diagnosis Date     Depression      DVT (deep venous thrombosis) (H)      Gastroesophageal reflux disease with esophagitis      NNEKA (obstructive sleep apnea)     used CPAP at higher BMI; has stopped using since BMI reduced     Ovarian cyst      Past Surgical History:   Procedure Laterality Date     COSMETIC SURGERY      breast augmentation and tummy tuck 2017     ENT SURGERY      tubes      EXAM UNDER ANESTHESIA PELVIC N/A 12/29/2018    Procedure: Vaginal exam under anesthesia, repair of vaginal cuff;  Surgeon: Bev Fischer MD;  Location: [facility] OR     GYN SURGERY      ovarian cyst , tubes tied      HYSTERECTOMY       LAPAROSCOPIC GASTRIC SLEEVE N/A 4/26/2021    Procedure: GASTRECTOMY, SLEEVE, LAPAROSCOPIC;  Surgeon: Davi Dee MD;  Location: [facility] OR     LAPAROSCOPIC HERNIORRHAPHY HIATAL N/A 4/26/2021    Procedure: HERNIORRHAPHY, HIATAL, LAPAROSCOPIC;  Surgeon: Davi Dee MD;  Location: [facility] OR     ORTHOPEDIC SURGERY      knee right      REPAIR LACERATION VAGINA N/A 12/29/2018    Procedure: Repair of vaginal laceration;  Surgeon: Bev Fischer MD;  Location: [facility] OR     acetaminophen (TYLENOL) 500 MG tablet  ALPRAZolam (XANAX) 0.25 MG tablet  aspirin 81 MG EC tablet  Biotin 10 MG TABS tablet  busPIRone (BUSPAR) 15 MG tablet  cetirizine  (ZYRTEC ALLERGY) 10 MG tablet  enoxaparin ANTICOAGULANT (LOVENOX) 40 MG/0.4ML syringe  fluconazole (DIFLUCAN) 150 MG tablet  hyoscyamine (LEVSIN) 0.125 MG tablet  lactobacillus rhamnosus, GG, (CULTURELL) capsule  omeprazole (PRILOSEC) 20 MG DR capsule  ondansetron (ZOFRAN-ODT) 4 MG ODT tab  oxyCODONE (ROXICODONE) 5 MG tablet  prochlorperazine (COMPAZINE) 10 MG tablet  senna-docusate (SENOKOT-S/PERICOLACE) 8.6-50 MG tablet  SOLRIAMFETOL 75 MG TABS tablet  spironolactone (ALDACTONE) 100 MG tablet  valACYclovir (VALTREX) 1000 mg tablet  VIIBRYD 40 MG TABS tablet      Allergies   Allergen Reactions     Sulfa Drugs Anaphylaxis     Antibiotic, but unsure which one     Codeine Nausea and Vomiting     Codeine Sulfate Nausea and Vomiting     Demerol Nausea and Vomiting     Wheat Bran Other (See Comments)     Food allergy tested and confirmed by [facility]     Wheat Gluten  [Gluten Meal] Diarrhea, GI Disturbance and Nausea     Metronidazole Rash     Family History  Family History   Problem Relation Age of Onset     Alcoholism Mother         drug addict     No Known Problems Father         estranged     Diabetes Maternal Grandmother      Breast Cancer Maternal Aunt      Social History   Social History     Tobacco Use     Smoking status: Never Smoker     Smokeless tobacco: Never Used   Substance Use Topics     Alcohol use: Yes     Drug use: No      Past medical history, past surgical history, medications, allergies, family history, and social history were reviewed with the patient. No additional pertinent items.       Review of Systems  A complete review of systems was performed with pertinent positives and negatives noted in the HPI, and all other systems negative.    Physical Exam   BP: 110/73  Pulse: 89  Temp: 98.1  F (36.7  C)  SpO2: 96 %  Physical Exam  Vitals signs reviewed.   Constitutional:       General: She is not in acute distress.     Appearance: She is well-developed. She is not ill-appearing, toxic-appearing or  diaphoretic.      Comments: Comfortably resting, lying in bed, NAD, nondiaphoretic, lucid, fully conversant, no  respiratory distress, alert and oriented.     HENT:      Head: Normocephalic and atraumatic.   Eyes:      General: No scleral icterus.  Neck:      Musculoskeletal: Normal range of motion and neck supple.   Musculoskeletal:        Hands:    Skin:     General: Skin is warm and dry.      Coloration: Skin is not pale.      Findings: No erythema or rash.   Neurological:      Mental Status: She is alert and oriented to person, place, and time.          ED Course      Procedures             No results found for any visits on 05/05/21.  Medications - No data to display     Assessments & Plan (with Medical Decision Making)   This is a 41-year-old female patient presenting to the emergency room with concerns of a blood clot on the dorsal aspect of her left wrist.  She had a recent surgery on 23 April.  She started having some discomfort and warmth on the dorsal aspect of her left wrist starting this evening.  On physical exam it appears that she has a superficial thrombophlebitis from the IV start in her hand.  There is a cord in her vessel and there is some tenderness in the region.  There is no obvious signs of superficial infection.  I believe she can be safely discharged with aftercare instructions which will include compression, heat wrap and she is already on Lovenox.  She can follow-up with her primary care physician for further concerns.    I have reviewed the nursing notes. I have reviewed the findings, diagnosis, plan and need for follow up with the patient.    New Prescriptions    No medications on file       Final diagnoses:   Superficial venous thrombosis of arm, left       --  Des Pham MD  [facility] EMERGENCY DEPARTMENT  5/5/2021     Des Pham MD  05/05/21 [id number]

## 2021-05-25 ENCOUNTER — OFFICE VISIT (OUTPATIENT)
Dept: ENDOCRINOLOGY | Facility: CLINIC | Age: 41
End: 2021-05-25
Payer: COMMERCIAL

## 2021-05-25 ENCOUNTER — RECORDS - HEALTHEAST (OUTPATIENT)
Dept: ADMINISTRATIVE | Facility: CLINIC | Age: 41
End: 2021-05-25

## 2021-05-25 VITALS
BODY MASS INDEX: 28.58 KG/M2 | SYSTOLIC BLOOD PRESSURE: 101 MMHG | WEIGHT: 167.4 LBS | HEART RATE: 91 BPM | OXYGEN SATURATION: 97 % | DIASTOLIC BLOOD PRESSURE: 57 MMHG | HEIGHT: 64 IN | TEMPERATURE: 98.1 F

## 2021-05-25 DIAGNOSIS — Z98.84 S/P LAPAROSCOPIC SLEEVE GASTRECTOMY: Primary | ICD-10-CM

## 2021-05-25 PROCEDURE — 99024 POSTOP FOLLOW-UP VISIT: CPT | Performed by: NURSE PRACTITIONER

## 2021-05-25 RX ORDER — URSODIOL 300 MG/1
300 CAPSULE ORAL 2 TIMES DAILY
Qty: 60 CAPSULE | Refills: 5 | Status: SHIPPED | OUTPATIENT
Start: 2021-05-25 | End: 2024-01-29

## 2021-05-25 ASSESSMENT — MIFFLIN-ST. JEOR: SCORE: 1399.32

## 2021-05-25 ASSESSMENT — PAIN SCALES - GENERAL: PAINLEVEL: NO PAIN (0)

## 2021-05-25 NOTE — PROGRESS NOTES
"Postoperative bariatric surgery visit.    Patient underwent sleeve gastrectomy 4 weeks ago.  4/26/2021 Dr. 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  What did you do with extra pills?  Were any opioid pain medication refills provided after surgery?  Were any opioid pain medications needed after 30 days postop?    /57 (BP Location: Right arm, Patient Position: Chair, Cuff Size: Adult Regular)   Pulse 91   Temp 98.1  F (36.7  C)   Ht 1.626 m (5' 4\")   Wt 75.9 kg (167 lb 6.4 oz)   LMP 06/05/2017   SpO2 97%   BMI 28.73 kg/m     Wt Readings from Last 5 Encounters:   05/25/21 75.9 kg (167 lb 6.4 oz)   04/26/21 82 kg (180 lb 12.4 oz)   02/24/21 83 kg (183 lb)   02/17/21 83.5 kg (184 lb)   12/21/20 82.1 kg (181 lb)      NAD  Overall looks great  Incisions c/d/i; non-tender    FINAL DIAGNOSIS:   STOMACH, PARTIAL GASTRECTOMY:   - Gastric mucosa with no significant histologic abnormality   - Negative for intestinal metaplasia and dysplasia   - No H. Pylori-like organisms identified on routine stain     Plan:  1. RD visit today.  2. Start vitamin supplements per RD directions.  3. Advance diet per RD directions.  4. Follow-up: 2 months.  5. Actigall prescription- sent to pharmacy   6. B12 SL or injection **  7. Pathology reviewed normal  8. Weight loss medications none      Trinh Davidson CNP  [facility] [place]   "

## 2021-05-25 NOTE — ASSESSMENT & PLAN NOTE
4 weeks post sleeve. Missed 1 week post op visit. Doing well over all. 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 Working on increasing volumes, suspect dehydration often but asymptomatic other than fatigue. Restarted b12 and multivitamin.     Rare reflux, taking omeprazole once daily x 1 week. Did have epigastric pain last week so will continue x 2 months and re-evaluate.     Pathology normal. actigall sent for gallstone prophylaxis.

## 2021-05-25 NOTE — PATIENT INSTRUCTIONS
"It was a pleasure meeting with you today.    Thank you for allowing us the privilege of caring for you. We hope we provided you with the excellent service you deserve.   Please let us know if there is anything else we can do for you so that we can be sure you are leaving completely satisfied with your care experience.    To ensure the quality of our services you may be receiving a patient satisfaction survey from an independent patient satisfaction monitoring company.    The greatest compliment you can give is a \"Likely to Recommend\"      Your visit was with Trinh Davidson NP today.     Instructions per today's visit:     -labs in 2 months  -follow up in 2 months  -keep working on fluids!   -okay to go back to exercise- no restriction         To schedule appointments with our team, please call [phone] option #1    Please call during clinic hours Monday through Friday 8:00a - 4:00p if you have questions or you can contact us via tok tok tok at anytime.      Nurses: [phone]  Fax: [phone]  Surgery Scheduler: [phone]    Please call the hospital at [phone] to speak with our on call MDs if you have urgent needs after hours, during weekends, or holidays.    **Please note if you need to go to the Emergency Room for any reason in the first 90 days after surgery it is important to go to the [facility] ER on the [place] on [address] off of the [address] off [address]    *For patients who are currently enrolled in our program and have not yet had weight loss surgery please note*:    You must be at your required goal weight at the time of your Anesthesia clinic visit as well as the day of surgery or your surgery will be canceled. You will not be able to obtain a new surgery date until you have met your goal weight.    Lab results will be communicated through My Chart or letter (if My Chart not used). Please call the clinic if you have not received communication after 1 week or if you have any " questions.?    Main follow-up parameters for all bariatric patients     Patients who have undergone Bariatric surgery:    1. Follow-up interval during the first year: ~1 week, 1 month, 3 month, 6 month,12 months.  Every 6 months until 5 years; and then annually thereafter.  The goal of follow-up sessions is to ensure that food intake behavior (choice of food and eating speed) and exercise/activity level are set appropriately.    2. Yearly lab tests ordered by our clinic.      3. The bariatric team should be aware and potentially evaluate all adverse gastrointestinal symptoms (dysphagia, abdominal pain, nausea, vomiting, diarrhea, heartburn, reflux, etc), which can be a sign of complication.  The bariatric surgeons perform general surgery procedures in addition to bariatric surgery (laparoscopic cholecystectomy, etc.)    4. Inability to tolerate textured food (chicken, steak, fish, eggs, beans) is NOT normal and may need to be evaluated by endoscopy performed by the bariatric surgeon.    _____________________________________________________________________________________________________________________________    Meal Replacement Products:    Here is the link to our new e-store where you can purchase our meal replacement products    [facility] E-Store  [email or web address]    The one week starter kit is a great way to sample a variety of products and see what works for you.    If you want more information about the product go to: Fresh Lulu    Free Shipping for orders over $75     Benefits of meal replacements products:    Portion and calorie control  Improved nutrition  Structured eating  Simplified food choices  Avoid contact with trigger foods  ______________________________________________________________________________________________________________________________    Healthy Lifestyle Support Group   [facility]  Virtual Support Group Now  Available    60 minutes of small group guided discussion, support and resources    Led by Health , Latonia Davison    For questions, and to receive the invite information, contact Latonia at [email or web address]    All our welcome!    One Friday per month, 12:30pm to 1:30pm  SELF COMPASSION: July 31st  CREATING MY WELLNESS VISION: August 28th  MINDFULNESS PRACTICES FOR A CALM BODY & MIND: September 25th  MINDFUL EATING TOOLS: October 30th  HEALTHY& HAPPY HOLIDAYS: November 20th  OPEN FORUM: December 18th  _______________________________________________________________________________________________________________________________    Connections: Bariatric Care support group  Due to the Covid-19 restrictions, we will be doing our support group virtually using Microsoft Teams. You will need to get an invitation to the group from Dirk Steve, Ph.D., LP at [email or web address] and to learn about using Microsoft Teams. The next meeting is on Tuesday, July 14 between 6:30 and 8 PM and there will be no formal speaker.    This group meets the second Tuesday of each month from 6:30 to 8 PM and will be held again at [facility] in the [address] [place] [address] (A-B room) when the Covid-19 restrictions are lifted. The group is led by Dirk Steve, Ph.D., Licensed Psychologist, [facility] Weight Management [facility]. There is no cost for group participation and is open to all [facility] (and those external to this program) pre- and post-operative bariatric surgery patients as well as their support system.   _________________________________________________________________________________________________________________________________      Interested in working with a health ?  Health coaches work with you to improve your overall health and wellbeing.  They look at the whole person, and may involve discussion of different areas of life, including, but not limited to the  four pillars of health (sleep, exercise, nutrition, and stress management). Discuss with your care team if you would like to start working a health .     Health Coaching-3 Pack:      $99 for three health coaching visits    Visits may be done in person or via phone    Coaching is a partnership between the  and the client; Coaches do not prescribe or diagnose    Coaching helps inspire the client to reach his/her personal goals   __________________________________________________________________________________________________________________________________    [facility] [place] [facility] Get Moving Program    Our team of physical therapists is trained to help you understand and take control of your condition. They will perform a thorough evaluation to determine your ability for activity and develop a customized plan to fit your goals and physical ability.  Scheduling: Unsure if the Get Moving program is right for you? Discuss the program with your medical provider or diabetes educator. You can also call us at [phone] to ask questions or schedule an appointment.   [facility] Get Moving Program  ______________________________________________________________________________________________________________________  Bluetooth Scale:    We hope to provide you with high quality telephone and virtual healthcare visits while social distancing for COVID-19 is necessary, as well as in the future when virtual visits may be more convenient for you.     Our technology team made it possible for Bluetooth scales to send weight measurements to our electronic medical record. This allows weights from you weighing at home to securely flow into the medical record, which will improve telephone and virtual visits.   Additionally, studies have shown that adults actually lose more weight when their weights are automatically sent to someone else, and also that this process is not stressful for those adults.    Below is a link for  purchasing the scale, with a discount code for our patients. You may call your insurance company to see if they will reimburse you for the cost of the scale, as a piece of durable medical equipment. The scales only go up to a weight of 400 pounds. This is an issue and we are working with the developer on increasing this. We found no scales that go over 400lb that have blue-tooth for connecting to [employer].    Scale to purchase: the Kiddy  Body  Scale: [email or web address]/[email or web address]=aw.ds    Discount Code: We have a discount code for our patients to bring the cost down to $50, the code is: [place]_Scale_20%off    Steps to link the scale to [employer] via an Android Phone (you can always disconnect at any point in the future):  1. The order must be placed first before the patient can access Track My Health within [employer].  2. Download Google Fit carl from the Google Play Store   a. Log in or register using your Google account   3. Download the [employer] carl from Google Play Store  a. Select add organization   b. Search for [employer] and select it   c. Log into [employer]  d. Select Track My Health   e. Select the green connect my account button   f. When prompted log into your Google account   g. Select okay to confirm the account   4. Download the [employer] Health Mate carl from Google Play Store  a. [place] for Kiddy   b. Go to profile   c. Tap google fit under the Apps section  d. Select the option to activate Google Fit integration   e. Select the same Google account   f. Select okay to confirm the account  g.   Steps to link the scale to [employer] via an iPhone (you can always disconnect at any point in the future):  **Note Tax Alli is not available for download on an iPad**  1. The order must be placed first before the patient can access Track My Health within [employer].  2. Locate the Health carl on your iPhone.  a. Set up your Apple  Health account as prompted  b. The Sources page will show Apps that communicate with your Health britton. Once all steps are completed, you should see Advanced Patient Care and [employer] listed under the Apps section and your iPhone under the devices section.  i. Select Health Mate  1. Under 'ALLOW  Ahandyhand  TO WRITE DATA ensure the toggle is on for Weight.  2. This will allow the scale to add your weight to the Apple Health  ii. Select MyChart  1. Under 'ALLOW  Dhingana  TO READ DATA ensure the toggle is on for Weight.  2. This allows [employer] to grab the weight from [employer] so your provider can see your weights.  3. Download the [employer] britton from the Britton Store   a. Select add organization                                                  b. Search for [employer] and select it  c. Log into [employer]  d. Select Track My Health   e. Select the green connect my account button   f. Follow prompts to link your device to [employer].  4. Download the Withings health mate britton in the Britton Store   a. [place] for Inbox[employer]   b. Go to profile   c. [employer] Health under the Apps section  d. If prompted to allow access with the Health Britton, toggle weight on for read and write access.

## 2021-05-25 NOTE — LETTER
"5/25/2021       RE: Aga Alanis  [address] [place] [address]     Dear Colleague,    Thank you for referring your patient, Aga Alanis, to the [facility] [place] at [facility]. Please see a copy of my visit note below.    Postoperative bariatric surgery visit.    Patient underwent sleeve gastrectomy 4 weeks ago.  4/26/2021 Dr. 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  What did you do with extra pills?  Were any opioid pain medication refills provided after surgery?  Were any opioid pain medications needed after 30 days postop?    /57 (BP Location: Right arm, Patient Position: Chair, Cuff Size: Adult Regular)   Pulse 91   Temp 98.1  F (36.7  C)   Ht 1.626 m (5' 4\")   Wt 75.9 kg (167 lb 6.4 oz)   LMP 06/05/2017   SpO2 97%   BMI 28.73 kg/m     Wt Readings from Last 5 Encounters:   05/25/21 75.9 kg (167 lb 6.4 oz)   04/26/21 82 kg (180 lb 12.4 oz)   02/24/21 83 kg (183 lb)   02/17/21 83.5 kg (184 lb)   12/21/20 82.1 kg (181 lb)      NAD  Overall looks great  Incisions c/d/i; non-tender    FINAL DIAGNOSIS:   STOMACH, PARTIAL GASTRECTOMY:   - Gastric mucosa with no significant histologic abnormality   - Negative for intestinal metaplasia and dysplasia   - No H. Pylori-like organisms identified on routine stain     Plan:  1. RD visit today.  2. Start vitamin supplements per RD directions.  3. Advance diet per RD directions.  4. Follow-up: 2 " months.  5. Actigall prescription- sent to pharmacy   6. B12 SL or injection **  7. Pathology reviewed normal  8. Weight loss medications none      Trinh Davidson CNP  [facility] [place]

## 2021-05-25 NOTE — NURSING NOTE
"Chief Complaint   Patient presents with     Follow Up     post-op 1 month       Vitals:    05/25/21 1008   BP: 101/57   BP Location: Right arm   Patient Position: Chair   Cuff Size: Adult Regular   Pulse: 91   Temp: 98.1  F (36.7  C)   SpO2: 97%   Weight: 75.9 kg (167 lb 6.4 oz)   Height: 1.626 m (5' 4\")       Body mass index is 28.73 kg/m .                           "

## 2021-05-28 ENCOUNTER — TELEPHONE (OUTPATIENT)
Dept: ENDOCRINOLOGY | Facility: CLINIC | Age: 41
End: 2021-05-28

## 2021-05-28 NOTE — TELEPHONE ENCOUNTER
No show for today's video visit. Sent text with link to join. Called pt mobile, no answer, VM full. Sent [employer] message. No response. Closed video after 15 mins. Pt may reschedule at [phone].    Maren Magana, RD, LD

## 2021-06-29 ENCOUNTER — VIRTUAL VISIT (OUTPATIENT)
Dept: ENDOCRINOLOGY | Facility: CLINIC | Age: 41
End: 2021-06-29
Payer: COMMERCIAL

## 2021-06-29 DIAGNOSIS — Z98.84 S/P LAPAROSCOPIC SLEEVE GASTRECTOMY: ICD-10-CM

## 2021-06-29 DIAGNOSIS — Z71.3 NUTRITIONAL COUNSELING: Primary | ICD-10-CM

## 2021-06-29 PROCEDURE — 97803 MED NUTRITION INDIV SUBSEQ: CPT | Mod: GT | Performed by: DIETITIAN, REGISTERED

## 2021-06-29 NOTE — PATIENT INSTRUCTIONS
Martinez Yung,    Follow-up with RD in 1 month    Thank you,    Renee Pérez, RD, LD  If you would like to schedule or reschedule an appointment with the RD, please call [phone]    Nutrition Goals  1) Follow bariatric regular diet (protein first; 3 part protein/1 part fruit/veg/starch)  2) Consume 60 grams of protein/day. - track protein, add Genepro as needed  3) Sip on 48-64 oz of fluids/day- between meals only.  4) Eat slowly (>20 min/meal), chewing foods well (to applesauce-like consistency).  5) Limit portions to 1/2 cup/meal.  6) Take the following after a Sleeve Gastrectomy:    Multivitamin/minerals: adult dose 2 times daily    Iron: 45-60 mg elemental (18-36 mg if low risk) - may partly or fully be covered in multivitamin     Calcium Citrate containing vitamin D: 500 mg 3 times daily or 600 mg 2 times daily    Vitamin B12: sublingual form of at least 500 mcg daily or injection of 1000 mcg monthly     B-50 Complex once daily     -Take the following after a Sleeve Gastrectomy:    Multivitamin/minerals: adult dose 1-2 times daily  Ideally want one that provides:   5,000 - 10,000 international units vitamin A daily   800 mg oral folate daily  8 - 22 mg zinc and 1 - 2 mg copper daily      Iron: 45-60 mg elemental (18-36 mg if low risk) - may partly or fully be covered in multivitamin       Calcium Citrate containing vitamin D: 500 mg 3 times daily or 600 mg 2 times daily    --Separate the calcium from your multivitmain or iron by at least 2 hours.    - Must be a chewable calcium citrate until post-op 3 months    - Options for calcium citrate: Carter calcium citrate chewable, bariatric advantage calcium citrate chewable, Celebrate vitamins calcium citrate chewable, Bariatric Fusion calcium citrate chewable      Vitamin B12: sublingual form of at least 500 mcg daily or injection of 1000 mcg monthly       B-50 Complex once daily (depending on MVI chosen)  Want 12 mg thiamin between B complex and MVI      Post-op Diet  Handouts:  Diet Guidelines after Weight-loss Surgery  [email or web address]     Your Stage 5 Diet: Regular Foods  [email or web address]    Supplements after Weight Loss Surgery  [email or web address]     Keeping Track of Fluids  [email or web address]    Exercises after Weight Loss Surgery (strengthening, when no weight lifting restrictions)  [email or web address]        Interested in working with a health ?  Health coaches work with you to improve your overall health and wellbeing.  They look at the whole person, and may involve discussion of different areas of life, including, but not limited to the four pillars of health (sleep, exercise, nutrition, and stress management). Discuss with your care team if you would like to start working a health .    Health Coaching-3 Pack:    $99 for three health coaching visits    Visits may be done in person or via phone    Coaching is a partnership between the  and the client; Coaches do not prescribe or diagnose    Coaching helps inspire the client to reach his/her personal goals      Virtual Support Groups are Available             Healthy Lifestyle Support Group      All are welcome!     Facilitator: Latonia Davison, Certified Health     - Meets once a month on a Friday from 12:30pm to 1:30pm.  - Due to Covid-19 she is doing this Support Group virtually using Microsoft Teams.  - 60 minutes of small group guided discussion, support and resources.  - Please email Latonia directly at [email or web address] to receive monthly invitations.  - If you opt to participate in individual health coaching sessions or for general questions, contact Latonia via email.  - Latonia will send out invites for each session, so the phone number and the conference ID may change for each session.

## 2021-06-29 NOTE — PROGRESS NOTES
"Aga Alanis is a 41 year old female who is being evaluated via a billable video visit.      The patient has been notified of following:     \"This video visit will be conducted via a call between you and your physician/provider. We have found that certain health care needs can be provided without the need for an in-person physical exam.  This service lets us provide the care you need with a video conversation.  If a prescription is necessary we can send it directly to your pharmacy.  If lab work is needed we can place an order for that and you can then stop by our lab to have the test done at a later time.    Video visits are billed at different rates depending on your insurance coverage.  Please reach out to your insurance provider with any questions.    If during the course of the call the physician/provider feels a video visit is not appropriate, you will not be charged for this service.\"    Patient has given verbal consent for Video visit? Yes  How would you like to obtain your AVS? MyChart  If you are dropped from the video visit, the video invite should be resent to: Text to cell phone: [phone]  Will anyone else be joining your video visit? No  {If patient encounters technical issues they should call [phone]      Video-Visit Details    Type of service:  Video Visit    Video Start Time: 10:36 AM  Video End Time: 10:54 AM    Originating Location (pt. Location): Home    Distant Location (provider location):  [facility] [place]     Platform used for Video Visit: [email or web address]    During this virtual visit the patient is located in [place], patient verifies this as the location during the entirety of this visit.     Nutrition Reassessment  Reason For Visit:  Aga Alanis is a 41 year old female presenting today for nutrition follow-up, 2 month s/p s/p sleeve gastrectomy (4/26/21) with Dr. Dee. Patient referred by Trinh Davidson NP on May 25, " "2021.    Anthropometrics:  Height: 5'4\"  Initial Consult Weight: 215 lbs  Day of Surgery Weight (4/26/21): 180 lbs  Weight (5/3/21): 171.2 lbs  Current Weight (6/22/21): 161 lbs with BMI: 27.6    Weight loss: -54 lbs from initial consult; -19 lbs from day of surgery    Current Vitamins/Minerals:   MVI [place]'s (gluten-free) with iron - once/day   Vitamin D3 - 2000  Liquid B12 500mcg    Nutrition History:  Pt reports consuming and tolerating bariatric regular diet. Tolerating most food except 1 time salad that didn't sit well (dole mix sunflower crunch, cabbage, carrots, sunflower seeds, sweet onion).  Fluid intake appears adequate. 48-64oz  Maintaining 1/2 c portions (measuring food)    Diet Recall  B: perfect protein 1/2 bar  L: 1/2 protein bar; left overs, fresh taco peppers, onion, little cheese, chicken  D: 1/4 cheese burger (no bun wheat allergy)  Beverages: water - 32 oz (1.5 aims for 2 bottles)    Progress with Previous Goals:  1) Follow bariatric low-fat full liquid diet through day 13 post-op, then to progress to pureed diet x 2 weeks(5/10-5/23).  If tolerating, may advance on day 29 post-op to bariatric soft diet (5/24- 6/24). - met, continues  2) Work towards 60 gm protein/day.- met, continues  3) Consume 48-64 oz fluids daily- between meals.- met, continues  4) Eat slowly (>20 min/meal), chewing well to smooth consistency once on the bariatric soft diet. - continues  5) Limit portions to 1/2 cup/meal.- met, continues  6) Start chewable/liquid multivitamin/minerals twice daily. -not met    Nutrition Prescription:  Grams Protein: 60 (minimum)   Amount of Fluid: 48-64 oz    Nutrition Diagnosis  Previous: Food and nutrition-related knowledge deficit r/t lack of prior exposure to diet advancements beyond bariatric low-fat full liquid diet aeb pt unable to verbalize understanding of bariatric pureed and soft diets.    Current: Food and nutrition-related knowledge deficit r/t lack of prior exposure to diet " advancements beyond bariatric pureed diet aeb pt unable to verbalize full understanding of bariatric soft and regular consistency diets.    Intervention  Materials/Education provided on bariatric soft and regular consistency diets, protein intake, fluid intake, eating pace, chewing foods well, portion control, sugar/fat intake, recommended vitamin/mineral supplements. Patient demonstrates understanding.       Expected Engagement: good    Goals:  1) Follow bariatric regular diet (protein first; 3 part protein/1 part fruit/veg/starch)  2) Consume 60 grams of protein/day. - track protein, add Genepro as needed  3) Sip on 48-64 oz of fluids/day- between meals only.  4) Eat slowly (>20 min/meal), chewing foods well (to applesauce-like consistency).  5) Limit portions to 1/2 cup/meal.  6) Take the following after a Sleeve Gastrectomy:    Multivitamin/minerals: adult dose 2 times daily    Iron: 45-60 mg elemental (18-36 mg if low risk) - may partly or fully be covered in multivitamin     Calcium Citrate containing vitamin D: 500 mg 3 times daily or 600 mg 2 times daily    Vitamin B12: sublingual form of at least 500 mcg daily or injection of 1000 mcg monthly     B-50 Complex once daily     -Take the following after a Sleeve Gastrectomy:    Multivitamin/minerals: adult dose 1-2 times daily  Ideally want one that provides:   5,000 - 10,000 international units vitamin A daily   800 mg oral folate daily  8 - 22 mg zinc and 1 - 2 mg copper daily      Iron: 45-60 mg elemental (18-36 mg if low risk) - may partly or fully be covered in multivitamin       Calcium Citrate containing vitamin D: 500 mg 3 times daily or 600 mg 2 times daily    --Separate the calcium from your multivitmain or iron by at least 2 hours.    - Must be a chewable calcium citrate until post-op 3 months    - Options for calcium citrate: Carter calcium citrate chewable, bariatric advantage calcium citrate chewable, Celebrate vitamins calcium citrate chewable,  Bariatric Fusion calcium citrate chewable      Vitamin B12: sublingual form of at least 500 mcg daily or injection of 1000 mcg monthly       B-50 Complex once daily (depending on MVI chosen)  Want 12 mg thiamin between B complex and MVI      Post-op Diet Handouts:  Diet Guidelines after Weight-loss Surgery  [email or web address]     Your Stage 5 Diet: Regular Foods  [email or web address]    Supplements after Weight Loss Surgery  [email or web address]     Keeping Track of Fluids  [email or web address]    Exercises after Weight Loss Surgery (strengthening, when no weight lifting restrictions)  [email or web address]         Follow-Up: 1 month    Time spent with patient: 18 minutes.  Renee Pérez RD LD

## 2021-06-29 NOTE — LETTER
"6/29/2021       RE: Aga Alanis  [address]  [facility] [address]     Dear Colleague,    Thank you for referring your patient, Aga Alanis, to the [facility] [place] at [facility]. Please see a copy of my visit note below.    Aga Alanis is a 41 year old female who is being evaluated via a billable video visit.      The patient has been notified of following:     \"This video visit will be conducted via a call between you and your physician/provider. We have found that certain health care needs can be provided without the need for an in-person physical exam.  This service lets us provide the care you need with a video conversation.  If a prescription is necessary we can send it directly to your pharmacy.  If lab work is needed we can place an order for that and you can then stop by our lab to have the test done at a later time.    Video visits are billed at different rates depending on your insurance coverage.  Please reach out to your insurance provider with any questions.    If during the course of the call the physician/provider feels a video visit is not appropriate, you will not be charged for this service.\"    Patient has given verbal consent for Video visit? Yes  How would you like to obtain your AVS? MyChart  If you are dropped from the video visit, the video invite should be resent to: Text to cell phone: [phone]  Will anyone else be joining your video visit? No  {If patient encounters technical issues they should call [phone]      Video-Visit Details    Type of service:  Video Visit    Video Start Time: 10:36 AM  Video End Time: 10:54 AM    Originating Location (pt. Location): Home    Distant Location (provider location):  [facility]     Platform used for Video Visit: [email or web address]    During this virtual visit the patient is located in [place], patient " "verifies this as the location during the entirety of this visit.     Nutrition Reassessment  Reason For Visit:  Aga Alanis is a 41 year old female presenting today for nutrition follow-up, 2 month s/p s/p sleeve gastrectomy (4/26/21) with Dr. Dee. Patient referred by Trinh Davidson NP on May 25, 2021.    Anthropometrics:  Height: 5'4\"  Initial Consult Weight: 215 lbs  Day of Surgery Weight (4/26/21): 180 lbs  Weight (5/3/21): 171.2 lbs  Current Weight (6/22/21): 161 lbs with BMI: 27.6    Weight loss: -54 lbs from initial consult; -19 lbs from day of surgery    Current Vitamins/Minerals:   MVI [place]'s (gluten-free) with iron - once/day   Vitamin D3 - 2000  Liquid B12 500mcg    Nutrition History:  Pt reports consuming and tolerating bariatric regular diet. Tolerating most food except 1 time salad that didn't sit well (dole mix sunflower crunch, cabbage, carrots, sunflower seeds, sweet onion).  Fluid intake appears adequate. 48-64oz  Maintaining 1/2 c portions (measuring food)    Diet Recall  B: perfect protein 1/2 bar  L: 1/2 protein bar; left overs, fresh taco peppers, onion, little cheese, chicken  D: 1/4 cheese burger (no bun wheat allergy)  Beverages: water - 32 oz (1.5 aims for 2 bottles)    Progress with Previous Goals:  1) Follow bariatric low-fat full liquid diet through day 13 post-op, then to progress to pureed diet x 2 weeks(5/10-5/23).  If tolerating, may advance on day 29 post-op to bariatric soft diet (5/24- 6/24). - met, continues  2) Work towards 60 gm protein/day.- met, continues  3) Consume 48-64 oz fluids daily- between meals.- met, continues  4) Eat slowly (>20 min/meal), chewing well to smooth consistency once on the bariatric soft diet. - continues  5) Limit portions to 1/2 cup/meal.- met, continues  6) Start chewable/liquid multivitamin/minerals twice daily. -not met    Nutrition Prescription:  Grams Protein: 60 (minimum)   Amount of Fluid: 48-64 oz    Nutrition Diagnosis  Previous: " Food and nutrition-related knowledge deficit r/t lack of prior exposure to diet advancements beyond bariatric low-fat full liquid diet aeb pt unable to verbalize understanding of bariatric pureed and soft diets.    Current: Food and nutrition-related knowledge deficit r/t lack of prior exposure to diet advancements beyond bariatric pureed diet aeb pt unable to verbalize full understanding of bariatric soft and regular consistency diets.    Intervention  Materials/Education provided on bariatric soft and regular consistency diets, protein intake, fluid intake, eating pace, chewing foods well, portion control, sugar/fat intake, recommended vitamin/mineral supplements. Patient demonstrates understanding.       Expected Engagement: good    Goals:  1) Follow bariatric regular diet (protein first; 3 part protein/1 part fruit/veg/starch)  2) Consume 60 grams of protein/day. - track protein, add Genepro as needed  3) Sip on 48-64 oz of fluids/day- between meals only.  4) Eat slowly (>20 min/meal), chewing foods well (to applesauce-like consistency).  5) Limit portions to 1/2 cup/meal.  6) Take the following after a Sleeve Gastrectomy:    Multivitamin/minerals: adult dose 2 times daily    Iron: 45-60 mg elemental (18-36 mg if low risk) - may partly or fully be covered in multivitamin     Calcium Citrate containing vitamin D: 500 mg 3 times daily or 600 mg 2 times daily    Vitamin B12: sublingual form of at least 500 mcg daily or injection of 1000 mcg monthly     B-50 Complex once daily     -Take the following after a Sleeve Gastrectomy:    Multivitamin/minerals: adult dose 1-2 times daily  Ideally want one that provides:   5,000 - 10,000 international units vitamin A daily   800 mg oral folate daily  8 - 22 mg zinc and 1 - 2 mg copper daily      Iron: 45-60 mg elemental (18-36 mg if low risk) - may partly or fully be covered in multivitamin       Calcium Citrate containing vitamin D: 500 mg 3 times daily or 600 mg 2 times  daily    --Separate the calcium from your multivitmain or iron by at least 2 hours.    - Must be a chewable calcium citrate until post-op 3 months    - Options for calcium citrate: Carter calcium citrate chewable, bariatric advantage calcium citrate chewable, Celebrate vitamins calcium citrate chewable, Bariatric Fusion calcium citrate chewable      Vitamin B12: sublingual form of at least 500 mcg daily or injection of 1000 mcg monthly       B-50 Complex once daily (depending on MVI chosen)  Want 12 mg thiamin between B complex and MVI      Post-op Diet Handouts:  Diet Guidelines after Weight-loss Surgery  [email or web address]     Your Stage 5 Diet: Regular Foods  [email or web address]    Supplements after Weight Loss Surgery  [email or web address]     Keeping Track of Fluids  [email or web address]    Exercises after Weight Loss Surgery (strengthening, when no weight lifting restrictions)  [email or web address]         Follow-Up: 1 month    Time spent with patient: 18 minutes.  Renee Pérez RD LD

## 2021-07-26 ENCOUNTER — LAB (OUTPATIENT)
Dept: LAB | Facility: CLINIC | Age: 41
End: 2021-07-26
Payer: COMMERCIAL

## 2021-07-26 DIAGNOSIS — Z98.84 S/P LAPAROSCOPIC SLEEVE GASTRECTOMY: ICD-10-CM

## 2021-07-26 LAB
ALBUMIN SERPL-MCNC: 3.7 G/DL (ref 3.4–5)
ALP SERPL-CCNC: 58 U/L (ref 40–150)
ALT SERPL W P-5'-P-CCNC: 38 U/L (ref 0–50)
ANION GAP SERPL CALCULATED.3IONS-SCNC: 2 MMOL/L (ref 3–14)
AST SERPL W P-5'-P-CCNC: 28 U/L (ref 0–45)
BILIRUB SERPL-MCNC: 0.4 MG/DL (ref 0.2–1.3)
BUN SERPL-MCNC: 6 MG/DL (ref 7–30)
CALCIUM SERPL-MCNC: 9.2 MG/DL (ref 8.5–10.1)
CHLORIDE BLD-SCNC: 108 MMOL/L (ref 94–109)
CHOLEST SERPL-MCNC: 193 MG/DL
CO2 SERPL-SCNC: 30 MMOL/L (ref 20–32)
CREAT SERPL-MCNC: 0.79 MG/DL (ref 0.52–1.04)
ERYTHROCYTE [DISTWIDTH] IN BLOOD BY AUTOMATED COUNT: 12.5 % (ref 10–15)
FASTING STATUS PATIENT QL REPORTED: YES
GFR SERPL CREATININE-BSD FRML MDRD: >90 ML/MIN/1.73M2
GLUCOSE BLD-MCNC: 82 MG/DL (ref 70–99)
HBA1C MFR BLD: 5.1 % (ref 0–5.6)
HCT VFR BLD AUTO: 42.5 % (ref 35–47)
HDLC SERPL-MCNC: 62 MG/DL
HGB BLD-MCNC: 14 G/DL (ref 11.7–15.7)
LDLC SERPL CALC-MCNC: 117 MG/DL
MCH RBC QN AUTO: 30.4 PG (ref 26.5–33)
MCHC RBC AUTO-ENTMCNC: 32.9 G/DL (ref 31.5–36.5)
MCV RBC AUTO: 92 FL (ref 78–100)
NONHDLC SERPL-MCNC: 131 MG/DL
PLATELET # BLD AUTO: 236 10E3/UL (ref 150–450)
POTASSIUM BLD-SCNC: 4.4 MMOL/L (ref 3.4–5.3)
PROT SERPL-MCNC: 7.5 G/DL (ref 6.8–8.8)
RBC # BLD AUTO: 4.61 10E6/UL (ref 3.8–5.2)
SODIUM SERPL-SCNC: 140 MMOL/L (ref 133–144)
TRIGL SERPL-MCNC: 72 MG/DL
WBC # BLD AUTO: 5 10E3/UL (ref 4–11)

## 2021-07-26 PROCEDURE — 80061 LIPID PANEL: CPT | Performed by: PATHOLOGY

## 2021-07-26 PROCEDURE — 82306 VITAMIN D 25 HYDROXY: CPT | Performed by: NURSE PRACTITIONER

## 2021-07-26 PROCEDURE — 83036 HEMOGLOBIN GLYCOSYLATED A1C: CPT | Performed by: PATHOLOGY

## 2021-07-26 PROCEDURE — 83970 ASSAY OF PARATHORMONE: CPT | Performed by: NURSE PRACTITIONER

## 2021-07-26 PROCEDURE — 36415 COLL VENOUS BLD VENIPUNCTURE: CPT | Performed by: PATHOLOGY

## 2021-07-26 PROCEDURE — 80053 COMPREHEN METABOLIC PANEL: CPT | Performed by: PATHOLOGY

## 2021-07-26 PROCEDURE — 85027 COMPLETE CBC AUTOMATED: CPT | Performed by: PATHOLOGY

## 2021-07-27 LAB
DEPRECATED CALCIDIOL+CALCIFEROL SERPL-MC: 35 UG/L (ref 20–75)
PTH-INTACT SERPL-MCNC: 42 PG/ML (ref 18–80)

## 2021-07-29 ENCOUNTER — TELEPHONE (OUTPATIENT)
Dept: ENDOCRINOLOGY | Facility: CLINIC | Age: 41
End: 2021-07-29

## 2021-08-20 ENCOUNTER — LAB (OUTPATIENT)
Dept: URGENT CARE | Facility: URGENT CARE | Age: 41
End: 2021-08-20
Attending: INTERNAL MEDICINE
Payer: COMMERCIAL

## 2021-08-20 ENCOUNTER — E-VISIT (OUTPATIENT)
Dept: URGENT CARE | Facility: CLINIC | Age: 41
End: 2021-08-20
Payer: COMMERCIAL

## 2021-08-20 DIAGNOSIS — Z20.822 SUSPECTED COVID-19 VIRUS INFECTION: ICD-10-CM

## 2021-08-20 DIAGNOSIS — Z20.822 SUSPECTED COVID-19 VIRUS INFECTION: Primary | ICD-10-CM

## 2021-08-20 PROCEDURE — 99421 OL DIG E/M SVC 5-10 MIN: CPT | Performed by: INTERNAL MEDICINE

## 2021-08-20 PROCEDURE — U0003 INFECTIOUS AGENT DETECTION BY NUCLEIC ACID (DNA OR RNA); SEVERE ACUTE RESPIRATORY SYNDROME CORONAVIRUS 2 (SARS-COV-2) (CORONAVIRUS DISEASE [COVID-19]), AMPLIFIED PROBE TECHNIQUE, MAKING USE OF HIGH THROUGHPUT TECHNOLOGIES AS DESCRIBED BY CMS-2020-01-R: HCPCS

## 2021-08-20 PROCEDURE — U0005 INFEC AGEN DETEC AMPLI PROBE: HCPCS

## 2021-08-20 NOTE — PATIENT INSTRUCTIONS
Dear Aga Alanis,    Your symptoms show that you may have coronavirus (COVID-19). This illness can cause fever, cough and trouble breathing. Many people get a mild case and get better on their own. Some people can get very sick.    Will I be tested for COVID-19?  We would like to test you for Covid-19 virus. I have placed orders for this test.     To schedule: go to your iWelcome home page and scroll down to the section that says  You have an appointment that needs to be scheduled  and click the large green button that says  Schedule Now  and follow the steps to find the next available openings.    If you are unable to complete these iWelcome scheduling steps, please call [phone] to schedule your testing.     Return to work/school/ guidance:  Please let your workplace manager and staffing office know when your quarantine ends     We can t give you an exact date as it depends on the above. You can calculate this on your own or work with your manager/staffing office to calculate this. (For example if you were exposed on 10/4, you would have to quarantine for 14 full days. That would be through 10/18. You could return on 10/19.)      If you receive a positive COVID-19 test result, follow the guidance of the those who are giving you the results. Usually the return to work is 10 (or in some cases 20 days from symptom onset.) If you work at [facility], you must also be cleared by Employee Occupational Health and Safety to return to work.        If you receive a negative COVID-19 test result and did not have a high risk exposure to someone with a known positive COVID-19 test, you can return to work once you're free of fever for 24 hours without fever-reducing medication and your symptoms are improving or resolved.      If you receive a negative COVID-19 test and If you had a high risk exposure to someone who has tested positive for COVID-19 then you can return to work 14 days after your last  contact with the positive individual    Note: If you have ongoing exposure to the covid positive person, this quarantine period may be more than 14 days. (For example, if you are continued to be exposed to your child who tested positive and cannot isolate from them, then the quarantine of 7-14 days can't start until your child is no longer contagious. This is typically 10 days from onset of the child's symptoms. So the total duration may be 17-24 days in this case.)    Sign up for [employer].   We know it's scary to hear that you might have COVID-19. We want to track your symptoms to make sure you're okay over the next 2 weeks. Please look for an email from [employer]--this is a free, online program that we'll use to keep in touch. To sign up, follow the link in the email you will receive. Learn more at [email or web address]    How can I take care of myself?    Get lots of rest. Drink extra fluids (unless a doctor has told you not to)    Take Tylenol (acetaminophen) or ibuprofen for fever or pain. If you have liver or kidney problems, ask your family doctor if it's okay to take Tylenol o ibuprofen    If you have other health problems (like cancer, heart failure, an organ transplant or severe kidney disease): Call your specialty clinic if you don't feel better in the next 2 days.    Know when to call 911. Emergency warning signs include:  o Trouble breathing or shortness of breath  o Pain or pressure in the chest that doesn't go away  o Feeling confused like you haven't felt before, or not being able to wake up  o Bluish-colored lips or face    Where can I get more information?  [facility] - About COVID-19:   [email or web address]    CDC - What to Do If You're Sick:   www.cdc.gov/coronavirus/2019-ncov/about/steps-when-sick.html    August 20, 2021  RE:  Aga Alanis                                                                                                                   [id number] [address] [facility] [facility] [address]      To whom it may concern:    I evaluated Aga Alanis on August 20, 2021. Aga Alanis should be excused from work/school.     They should let their workplace manager and staffing office know when their quarantine ends.    We can not give an exact date as it depends on the information below. They can calculate this on their own or work with their manager/staffing office to calculate this. (For example if they were exposed on 10/04, they would have to quarantine for 14 full days. That would be through 10/18. They could return on 10/19.)    Quarantine Guidelines:      If patient receives a positive COVID-19 test result, they should follow the guidance of those who are giving the results. Usually the return to work is 10 (or in some cases 20 days from symptom onset.) If they work at [employer], they must be cleared by Employee Occupational Health and Safety to return to work.        If patient receives a negative COVID-19 test result and did not have a high risk exposure to someone with a known positive COVID-19 test, they can return to work once they're free of fever for 24 hours without fever-reducing medication and their symptoms are improving or resolved.      If patient receives a negative COVID-19 test and if they had a high risk exposure to someone who has tested positive for COVID-19 then they can return to work 14 days after their last contact with the positive individual    Note: If there is ongoing exposure to the covid positive person, this quarantine period may be longer than 14 days. (For example, if they are continually exposed to their child, who tested positive and cannot isolate from them, then the quarantine of 7-14 days can't start until their child is no longer contagious. This is typically 10 days from onset to the child's symptoms. So the total duration may be 17-24 days in this case.)     Sincerely,  Marylou Sadler,  MD

## 2021-08-21 LAB — SARS-COV-2 RNA RESP QL NAA+PROBE: NEGATIVE

## 2021-09-11 ENCOUNTER — APPOINTMENT (OUTPATIENT)
Dept: CT IMAGING | Facility: CLINIC | Age: 41
End: 2021-09-11
Attending: EMERGENCY MEDICINE
Payer: COMMERCIAL

## 2021-09-11 ENCOUNTER — HOSPITAL ENCOUNTER (EMERGENCY)
Facility: CLINIC | Age: 41
Discharge: HOME | End: 2021-09-11
Attending: EMERGENCY MEDICINE | Admitting: EMERGENCY MEDICINE
Payer: COMMERCIAL

## 2021-09-11 VITALS
BODY MASS INDEX: 25.78 KG/M2 | OXYGEN SATURATION: 98 % | HEART RATE: 109 BPM | WEIGHT: 151 LBS | DIASTOLIC BLOOD PRESSURE: 81 MMHG | SYSTOLIC BLOOD PRESSURE: 126 MMHG | TEMPERATURE: 98.3 F | RESPIRATION RATE: 16 BRPM | HEIGHT: 64 IN

## 2021-09-11 DIAGNOSIS — R51.9 NONINTRACTABLE HEADACHE, UNSPECIFIED CHRONICITY PATTERN, UNSPECIFIED HEADACHE TYPE: ICD-10-CM

## 2021-09-11 LAB
ALBUMIN SERPL-MCNC: 3.7 G/DL (ref 3.4–5)
ALP SERPL-CCNC: 53 U/L (ref 40–150)
ALT SERPL W P-5'-P-CCNC: 33 U/L (ref 0–50)
ANION GAP SERPL CALCULATED.3IONS-SCNC: 4 MMOL/L (ref 3–14)
AST SERPL W P-5'-P-CCNC: 16 U/L (ref 0–45)
BASOPHILS # BLD AUTO: 0.1 10E3/UL (ref 0–0.2)
BASOPHILS NFR BLD AUTO: 1 %
BILIRUB SERPL-MCNC: 0.5 MG/DL (ref 0.2–1.3)
BUN SERPL-MCNC: 8 MG/DL (ref 7–30)
CALCIUM SERPL-MCNC: 9.1 MG/DL (ref 8.5–10.1)
CHLORIDE BLD-SCNC: 106 MMOL/L (ref 94–109)
CO2 SERPL-SCNC: 29 MMOL/L (ref 20–32)
CREAT SERPL-MCNC: 0.81 MG/DL (ref 0.52–1.04)
EOSINOPHIL # BLD AUTO: 0.1 10E3/UL (ref 0–0.7)
EOSINOPHIL NFR BLD AUTO: 1 %
ERYTHROCYTE [DISTWIDTH] IN BLOOD BY AUTOMATED COUNT: 12.6 % (ref 10–15)
GFR SERPL CREATININE-BSD FRML MDRD: 89 ML/MIN/1.73M2
GLUCOSE BLD-MCNC: 76 MG/DL (ref 70–99)
HCG SERPL QL: NEGATIVE
HCT VFR BLD AUTO: 42.1 % (ref 35–47)
HGB BLD-MCNC: 13.7 G/DL (ref 11.7–15.7)
HOLD SPECIMEN: NORMAL
IMM GRANULOCYTES # BLD: 0 10E3/UL
IMM GRANULOCYTES NFR BLD: 0 %
LYMPHOCYTES # BLD AUTO: 2.1 10E3/UL (ref 0.8–5.3)
LYMPHOCYTES NFR BLD AUTO: 42 %
MCH RBC QN AUTO: 30.4 PG (ref 26.5–33)
MCHC RBC AUTO-ENTMCNC: 32.5 G/DL (ref 31.5–36.5)
MCV RBC AUTO: 93 FL (ref 78–100)
MONOCYTES # BLD AUTO: 0.4 10E3/UL (ref 0–1.3)
MONOCYTES NFR BLD AUTO: 7 %
NEUTROPHILS # BLD AUTO: 2.4 10E3/UL (ref 1.6–8.3)
NEUTROPHILS NFR BLD AUTO: 49 %
NRBC # BLD AUTO: 0 10E3/UL
NRBC BLD AUTO-RTO: 0 /100
PLATELET # BLD AUTO: 268 10E3/UL (ref 150–450)
POTASSIUM BLD-SCNC: 3.6 MMOL/L (ref 3.4–5.3)
PROT SERPL-MCNC: 7.5 G/DL (ref 6.8–8.8)
RBC # BLD AUTO: 4.51 10E6/UL (ref 3.8–5.2)
SODIUM SERPL-SCNC: 139 MMOL/L (ref 133–144)
WBC # BLD AUTO: 5.1 10E3/UL (ref 4–11)

## 2021-09-11 PROCEDURE — 70498 CT ANGIOGRAPHY NECK: CPT

## 2021-09-11 PROCEDURE — 258N000003 HC RX IP 258 OP 636: Performed by: EMERGENCY MEDICINE

## 2021-09-11 PROCEDURE — 96375 TX/PRO/DX INJ NEW DRUG ADDON: CPT

## 2021-09-11 PROCEDURE — 82040 ASSAY OF SERUM ALBUMIN: CPT | Performed by: EMERGENCY MEDICINE

## 2021-09-11 PROCEDURE — 85041 AUTOMATED RBC COUNT: CPT | Performed by: EMERGENCY MEDICINE

## 2021-09-11 PROCEDURE — 250N000011 HC RX IP 250 OP 636: Performed by: EMERGENCY MEDICINE

## 2021-09-11 PROCEDURE — 250N000009 HC RX 250: Performed by: EMERGENCY MEDICINE

## 2021-09-11 PROCEDURE — 99285 EMERGENCY DEPT VISIT HI MDM: CPT | Mod: 25

## 2021-09-11 PROCEDURE — 36415 COLL VENOUS BLD VENIPUNCTURE: CPT | Performed by: EMERGENCY MEDICINE

## 2021-09-11 PROCEDURE — 96374 THER/PROPH/DIAG INJ IV PUSH: CPT | Mod: 59

## 2021-09-11 PROCEDURE — 84703 CHORIONIC GONADOTROPIN ASSAY: CPT | Performed by: EMERGENCY MEDICINE

## 2021-09-11 PROCEDURE — 70450 CT HEAD/BRAIN W/O DYE: CPT

## 2021-09-11 RX ORDER — DIPHENHYDRAMINE HYDROCHLORIDE 50 MG/ML
25 INJECTION INTRAMUSCULAR; INTRAVENOUS ONCE
Status: COMPLETED | OUTPATIENT
Start: 2021-09-11 | End: 2021-09-11

## 2021-09-11 RX ORDER — IOPAMIDOL 755 MG/ML
70 INJECTION, SOLUTION INTRAVASCULAR ONCE
Status: COMPLETED | OUTPATIENT
Start: 2021-09-11 | End: 2021-09-11

## 2021-09-11 RX ORDER — METOCLOPRAMIDE HYDROCHLORIDE 5 MG/ML
5 INJECTION INTRAMUSCULAR; INTRAVENOUS ONCE
Status: COMPLETED | OUTPATIENT
Start: 2021-09-11 | End: 2021-09-11

## 2021-09-11 RX ORDER — KETOROLAC TROMETHAMINE 15 MG/ML
15 INJECTION, SOLUTION INTRAMUSCULAR; INTRAVENOUS ONCE
Status: COMPLETED | OUTPATIENT
Start: 2021-09-11 | End: 2021-09-11

## 2021-09-11 RX ADMIN — METOCLOPRAMIDE HYDROCHLORIDE 5 MG: 5 INJECTION INTRAMUSCULAR; INTRAVENOUS at 12:04

## 2021-09-11 RX ADMIN — KETOROLAC TROMETHAMINE 15 MG: 15 INJECTION, SOLUTION INTRAMUSCULAR; INTRAVENOUS at 12:01

## 2021-09-11 RX ADMIN — SODIUM CHLORIDE 90 ML: 9 INJECTION, SOLUTION INTRAVENOUS at 12:10

## 2021-09-11 RX ADMIN — SODIUM CHLORIDE 1000 ML: 9 INJECTION, SOLUTION INTRAVENOUS at 13:12

## 2021-09-11 RX ADMIN — IOPAMIDOL 70 ML: 755 INJECTION, SOLUTION INTRAVENOUS at 12:10

## 2021-09-11 RX ADMIN — DIPHENHYDRAMINE HYDROCHLORIDE 25 MG: 50 INJECTION, SOLUTION INTRAMUSCULAR; INTRAVENOUS at 12:03

## 2021-09-11 ASSESSMENT — MIFFLIN-ST. JEOR: SCORE: 1324.93

## 2021-09-11 NOTE — ED NOTES
"Patient reports headache started yesterday. Describes discomfort as a \"zapping\" type pain in right ear. States has a headache in occipital region. Denies vision changes. States light bothers her headache. Took oxycodone last night with no relief.  "

## 2021-09-11 NOTE — ED PROVIDER NOTES
"  History   Chief Complaint:  Headache       HPI  History supplemented by electronic chart review  Aga Alanis is a 41 year old female who presents with a headache. Beginning yesterday around 1700, Aga began to experience some right ear pain she describes as \"zapping\", that soon developed into a constant severe headache also accompanied with the right-sided \"zapping\" that occurs every minute or so. She states she has never experienced a headache at this severity before. In the ED today, Aga reports experiencing the same headache located on the top of her head accompanied with some right upper neck pain. She states her legs and arms feel okay. Denies any vomiting, chest pain, or any abdominal pain. She attempted 2 advils to relieve her headache, which did not help. Also attempted taking residual oxycodone from prior surgery, adminstering one at 2330 and 0230 last night, and one this morning at 0600. She states the oxycodone provides temporary relief, until the headache returns. She has history of a partial factor 5 gene mutation. Also mentions prior DVT and superficial thrombophlebitis always provoked by IV placement.  She is in the ED today seeking reasoning and relief from her headache.    Review of Systems  All other systems are reviewed negative except as above in HPI    Allergies:  Sulfa Drugs  Codeine  Codeine Sulfate  Demerol  Wheat Bran  Wheat Gluten  [Gluten Meal]  Metronidazole    Medications:  Ursodiol  Omeprazole  Ondansetron  Solriamfetol  Viibryd   Valtrex  Culturell  Alprazolam  Aspirin 81 mg   Cetirizine  Spironolactone   Aldactone  Metformin    Past Medical History:    Depression  DVT  GERD  NNEKA  Ovarian cyst  Obesity   Acute bleeding   Anxiety  Recurrent cold sores  Factor V Leiden mutation  Iron deficiency anemia  Major depressive disorder   ADHD  Uterocele  Metrorrhagia  Herpetic gingivostomatitis    Past Surgical History:    Breast augmentation and tummy tuck  ENT, tubes  GYN " "surgery, tubes tied  Hysterectomy  Gastric sleeve  Herniorrhaphy hiatal  Orthopedic, right knee  Laceration repair, vagina     Family History:    Mother - alcoholism, cervical cancer, depression/anxiety, drug abuse  Father - alcohol/drug    Social History:  The patient is accompanied by her  today in the ED.    Physical Exam     Patient Vitals for the past 24 hrs:   BP Temp Temp src Pulse Resp SpO2 Height Weight   09/11/21 1052 126/81 98.3  F (36.8  C) Oral 109 16 98 % 1.626 m (5' 4\") 68.5 kg (151 lb)     Physical Exam  General: Nontoxic appearing woman sitting upright in room 6,  at bedside  HENT: mucous membranes moist, OP clear, TMs clear, ears nontender  Eyes: pupils normal without nystagmus, mild photophobia, no scleral injection  CV: regular rate as above, regular rhythm  Resp: normal effort, speaks in full phrases, no stridor, no cough observed  GI: abdomen soft and nontender  MSK: no bony tenderness to face, skull, or cervical spine  Skin: appropriately warm and dry, no petechiae, no vesicles  Neuro: awake, alert, clear speech, fully oriented, face symmetric,  normal, strength and sensation intact in all extr, no nuchal rigidity, ambulatory without ataxia  Psych: cooperative      Emergency Department Course     Imaging:    CTA Head Neck with Contrast  Final Result  IMPRESSION:  HEAD CT:  1.  No evidence of acute intracranial hemorrhage or mass effect.    HEAD CTA:  1.  No evidence of pathologic vascular occlusion or saccular aneurysm  within the visualized intracranial circulation via CT angiography.    NECK CTA:  1.  No evidence of hemodynamically significant stenosis within either  internal carotid artery within the neck by NASCET criteria.  2.  No evidence of arterial dissection.  Per radiology    CT Head w/o Contrast  Final Result  IMPRESSION:  HEAD CT:  1.  No evidence of acute intracranial hemorrhage or mass effect.    HEAD CTA:  1.  No evidence of pathologic vascular occlusion or " saccular aneurysm  within the visualized intracranial circulation via CT angiography.    NECK CTA:  1.  No evidence of hemodynamically significant stenosis within either  internal carotid artery within the neck by NASCET criteria.  2.  No evidence of arterial dissection.  Per Radiology    Laboratory:   CBC: WBC 5.1, HGB 13.7,      CMP:  all WNL (Creatinine 0.81)     HCG qualitative pregnancy blood: negative      Procedures  None    Emergency Department Course:    Reviewed:  I reviewed nursing notes, vitals, past medical history and care everywhere    Assessments:  1148 I obtained history and examined the patient as noted above.     1312 I rechecked the patient and explained findings.     Interventions:  1201 Ketorolac 15 mg IV    1203 Benadryl 25 mg IV    1204 Reglan 5 mg IV    1312 0.9% NS 1000 mL IV    Disposition:  The patient was discharged to home.       Impression & Plan   Medical Decision Making:  While I considered variety potential dangerous conditions, including arterial dissection, intracranial hemorrhage, dural venous sinus thrombosis,  tumor, meningitis, and others, fortunately, the results of her evaluation today are benign, including CT as well as angiography.  I discussed with the patient and her  that I felt these tests were necessary to evaluate for dissection, though she does not have reproducible neurologic signs or symptoms.  I do not think she requires lumbar puncture.  Suspicion for subarachnoid hemorrhage is low.  This was noted thunderclap headache.  She felt much improved after treatments provided and was subsequently content to be discharged home with recommendation for outpatient follow-up after questions were answered.  Return for sudden worsening in any hour.    Diagnosis:    ICD-10-CM    1. Nonintractable headache, unspecified chronicity pattern, unspecified headache type  R51.9      This note was completed in part using Dragon voice recognition software. Although  reviewed after completion, some word and grammatical errors may occur.    Scribe Disclosure:  I, Cynthia Novak, am serving as a scribe at 11:03 AM on 9/11/2021 to document services personally performed by Des Rodriguez MD based on my observations and the provider's statements to me.              Des Rodriguez MD  09/11/21 [id number]

## 2021-09-11 NOTE — ED TRIAGE NOTES
"Started with a \"zapping\" pain to R ear yesterday and has been having stab like pains to R side of head.  Aleve helped some.  Has photosensitivity.  No hx migraines. Also states had leftover oxycodone and took last night which didn't take away the pain.   "

## 2021-09-19 ENCOUNTER — TELEPHONE (OUTPATIENT)
Dept: ENDOCRINOLOGY | Facility: CLINIC | Age: 41
End: 2021-09-19

## 2021-09-25 ENCOUNTER — HEALTH MAINTENANCE LETTER (OUTPATIENT)
Age: 41
End: 2021-09-25

## 2021-12-06 ENCOUNTER — LAB (OUTPATIENT)
Dept: LAB | Facility: CLINIC | Age: 41
End: 2021-12-06
Attending: FAMILY MEDICINE

## 2021-12-06 ENCOUNTER — E-VISIT (OUTPATIENT)
Dept: URGENT CARE | Facility: CLINIC | Age: 41
End: 2021-12-06
Payer: COMMERCIAL

## 2021-12-06 DIAGNOSIS — Z20.822 CLOSE EXPOSURE TO 2019 NOVEL CORONAVIRUS: ICD-10-CM

## 2021-12-06 DIAGNOSIS — Z20.822 CLOSE EXPOSURE TO 2019 NOVEL CORONAVIRUS: Primary | ICD-10-CM

## 2021-12-06 LAB — SARS-COV-2 RNA RESP QL NAA+PROBE: NEGATIVE

## 2021-12-06 PROCEDURE — 99421 OL DIG E/M SVC 5-10 MIN: CPT | Performed by: FAMILY MEDICINE

## 2021-12-06 PROCEDURE — U0003 INFECTIOUS AGENT DETECTION BY NUCLEIC ACID (DNA OR RNA); SEVERE ACUTE RESPIRATORY SYNDROME CORONAVIRUS 2 (SARS-COV-2) (CORONAVIRUS DISEASE [COVID-19]), AMPLIFIED PROBE TECHNIQUE, MAKING USE OF HIGH THROUGHPUT TECHNOLOGIES AS DESCRIBED BY CMS-2020-01-R: HCPCS

## 2021-12-06 PROCEDURE — U0005 INFEC AGEN DETEC AMPLI PROBE: HCPCS

## 2021-12-06 NOTE — PATIENT INSTRUCTIONS
"  Dear Aga Alanis,    Based on your exposure to COVID-19 (coronavirus), we would like to test you for this virus. I have placed an order for this test.The best time for testing is 5-7 days after the exposure.    How to schedule:  Go to your [email or web address] home page and scroll down to the section that says  You have an appointment that needs to be scheduled  and click the large green button that says  Schedule Now  and follow the steps to find the next available opening.     If you are unable to complete these [employer] scheduling steps, please call [phone] to schedule your testing.     Return to work/school/ guidance:   For people with high risk exposures outside the home    Please let your workplace manager and staffing office know when your quarantine ends.     We can not give you an exact date as it depends on the information below. You can calculate this on your own or work with your manager/staffing office to calculate this. (For example if you were exposed on 10/4, you would have to quarantine for 14 full days. That would be through 10/18. You could return on 10/19.)    Quarantine Guidelines:  Patients (\"contacts\") who have been in close prolonged contact of an infected person(s) (within six feet for at least 15 minutes within a 24 hour period), and remain asymptomatic should enter quarantine based on the following options:    14-day quarantine period (this remains the CDC recommendation for the greatest protection against spread of COVID-19) OR    Minimum 7-day quarantine with negative RT-PCR test collected on day 5 or later OR    10-day quarantine with no test  Quarantine Guideline exceptions are as follows:    People who have been fully vaccinated do not need to quarantine if the exposure was at least 2 weeks after the last vaccination. This includes vaccinated health care workers.    Not fully vaccinated and unvaccinated Individuals who work in health care, congregate care, or congregate living " should be off work for 14 days from their last date of exposure. Community activities for this group can be resumed based on options above. Fully vaccinated individuals in this group do not need to quarantine from work after exposure.    Not fully vaccinated and unvaccinated people whose high-risk exposure was a household member should always quarantine for 14 days from their last date of exposure. Fully vaccinated people in this category do not need to quarantine.    Not fully vaccinated or unvaccinated residents of congregate care and congregate living settings should always quarantine for 14 days from their last date of exposure. Fully vaccinated residents do not need to quarantine.  Note: If you have ongoing exposure to the covid positive person, this quarantine period may be more than 14 days. (For example, if you are continued to be exposed to your child who tested positive and cannot isolate from them, then the quarantine of 7-14 days can't start until your child is no longer contagious. This is typically 10 days from onset of the child's symptoms. So the total duration may be 17-24 days in this case.)    You should continue symptom monitoring until day 14 post-exposure. If you develop signs or symptoms of COVID-19, isolate and get tested (even if you have been tested already).    How to quarantine:   Stay home and away from others. Don't go to school or anywhere else. Generally quarantine means staying home from work but there are some exceptions to this. Please contact your workplace.  No hugging, kissing or shaking hands.  Don't let anyone visit.  Cover your mouth and nose with a mask, tissue or washcloth to avoid spreading germs.  Wash your hands and face often. Use soap and water.    What are the symptoms of COVID-19?  The most common symptoms are cough, fever and trouble breathing. Less common symptoms include headache, body aches, fatigue (feeling very tired), chills, sore throat, stuffy or runny nose,  diarrhea (loose poop), loss of taste or smell, belly pain, and nausea or vomiting (feeling sick to your stomach or throwing up).  After 14 days, if you have still don't have symptoms, you likely don't have this virus.  If you develop symptoms, follow these guidelines.  If you're normally healthy: Please start another eVisit.  If you have a serious health problem (like cancer, heart failure, an organ transplant or kidney disease): Call your specialty clinic. Let them know that you might have COVID-19.    Where can I get more information?  [facility] - About COVID-19: [email or web address]  CDC - What to Do If You're Sick: www.cdc.gov/coronavirus/2019-ncov/about/steps-when-sick.html  CDC - Ending Home Isolation: www.cdc.gov/coronavirus/2019-ncov/hcp/disposition-in-home-patients.html  CDC - Caring for Someone: www.cdc.gov/coronavirus/2019-ncov/if-you-are-sick/care-for-someone.html  [facility] clinical trials (COVID-19 research studies): [email or web address]-clinical-trials  Below are the COVID-19 hotlines at the [place] [facility] ([facility]). Interpreters are available.  For health questions: Call 829-408-6594 or 1-850.431.7734 (7 a.m. to 7 p.m.)  For questions about schools and childcare: Call 955-049-6174 or 1-649.947.1501 (7 a.m. to 7 p.m.)

## 2021-12-06 NOTE — TELEPHONE ENCOUNTER
Pt not on video call.     Called pt, no answer. Unable to leave VM as inbox is full.    Marked as no show.    WESLY Amin, MATT, LD  
n/a

## 2022-01-11 ENCOUNTER — E-VISIT (OUTPATIENT)
Dept: URGENT CARE | Facility: CLINIC | Age: 42
End: 2022-01-11
Payer: COMMERCIAL

## 2022-01-11 DIAGNOSIS — Z20.822 SUSPECTED COVID-19 VIRUS INFECTION: Primary | ICD-10-CM

## 2022-01-11 PROCEDURE — 99421 OL DIG E/M SVC 5-10 MIN: CPT | Performed by: EMERGENCY MEDICINE

## 2022-01-12 ENCOUNTER — APPOINTMENT (OUTPATIENT)
Dept: URGENT CARE | Facility: CLINIC | Age: 42
End: 2022-01-12
Payer: COMMERCIAL

## 2022-01-15 ENCOUNTER — HEALTH MAINTENANCE LETTER (OUTPATIENT)
Age: 42
End: 2022-01-15

## 2022-01-27 ENCOUNTER — LAB (OUTPATIENT)
Dept: URGENT CARE | Facility: URGENT CARE | Age: 42
End: 2022-01-27
Attending: EMERGENCY MEDICINE
Payer: COMMERCIAL

## 2022-01-27 DIAGNOSIS — Z20.822 SUSPECTED COVID-19 VIRUS INFECTION: ICD-10-CM

## 2022-01-27 PROCEDURE — U0003 INFECTIOUS AGENT DETECTION BY NUCLEIC ACID (DNA OR RNA); SEVERE ACUTE RESPIRATORY SYNDROME CORONAVIRUS 2 (SARS-COV-2) (CORONAVIRUS DISEASE [COVID-19]), AMPLIFIED PROBE TECHNIQUE, MAKING USE OF HIGH THROUGHPUT TECHNOLOGIES AS DESCRIBED BY CMS-2020-01-R: HCPCS

## 2022-01-27 PROCEDURE — U0005 INFEC AGEN DETEC AMPLI PROBE: HCPCS

## 2022-01-28 LAB — SARS-COV-2 RNA RESP QL NAA+PROBE: NEGATIVE

## 2022-03-09 ENCOUNTER — HOSPITAL ENCOUNTER (OUTPATIENT)
Dept: ULTRASOUND IMAGING | Facility: CLINIC | Age: 42
Discharge: HOME | End: 2022-03-09
Attending: FAMILY MEDICINE | Admitting: FAMILY MEDICINE
Payer: COMMERCIAL

## 2022-03-09 DIAGNOSIS — M79.89 PAIN AND SWELLING OF LOWER EXTREMITY, LEFT: ICD-10-CM

## 2022-03-09 DIAGNOSIS — M79.605 PAIN AND SWELLING OF LOWER EXTREMITY, LEFT: ICD-10-CM

## 2022-03-09 DIAGNOSIS — Z86.718 HISTORY OF DVT (DEEP VEIN THROMBOSIS): ICD-10-CM

## 2022-03-09 PROCEDURE — 93971 EXTREMITY STUDY: CPT | Mod: LT

## 2022-11-25 ENCOUNTER — HOSPITAL ENCOUNTER (OUTPATIENT)
Dept: MAMMOGRAPHY | Facility: CLINIC | Age: 42
Discharge: HOME | End: 2022-11-25
Attending: PHYSICIAN ASSISTANT | Admitting: PHYSICIAN ASSISTANT
Payer: COMMERCIAL

## 2022-11-25 DIAGNOSIS — Z12.31 VISIT FOR SCREENING MAMMOGRAM: ICD-10-CM

## 2022-11-25 PROCEDURE — 77067 SCR MAMMO BI INCL CAD: CPT

## 2022-12-26 ENCOUNTER — HEALTH MAINTENANCE LETTER (OUTPATIENT)
Age: 42
End: 2022-12-26

## 2023-02-24 NOTE — PROGRESS NOTES
2/24/2023         RE: Amadou Wade  [id number] [facility][note 3] [id number]        Dear Colleague,    Thank you for referring your patient[facility] Please see a copy of my visit note below.    [facility] Note - EP    Encounter Date: Feb 24, 2023    Dermatology Problem List:  0. NUB,central superior abdomen, s/p bx 2/24/23   1. SCCis mid chest,  s/p Efudex for 2 weeks starting 8/2/21  2. AK  - s/p cryo 12/10/21  - left mid cheek, s/p Efudex for 2 weeks starting 8/2/21 and cryo 12/10/21  3. 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 Hordeolum, left upper eyelid  ____________________________________________    Assessment & Plan:     #. NUB, central superior abdomen, s/p bx 2/24/23  - see procedure note below    #. AKs - left helix, left cheek x 2, left forehead x 2, right nasal bridge  - LN2 performed today - see note below    #.  Dilated pore of Fred - back  - Reassurance    #. Suspected subungual hemorrhage - right second digit  History and exam consistent.  - Discussed monitoring and close follow-up for proximal involvement or progression    # Hx NMSC  - No evidence of recurrent disease  - Sun avoidance and protective strategies discussed including use of daily SPF 30+ sunscreen  - ABCDEs of melanoma and s/s of NMSC discussed      Procedures Performed:   - Shave biopsy procedure note, location(s): above. After discussion of benefits and risks including but not limited to bleeding, infection, scar, incomplete removal, recurrence, and non-diagnostic biopsy, written consent and photographs were obtained. The area was cleaned with isopropyl alcohol. 0.5mL of 1% lidocaine with epinephrine was injected to obtain adequate anesthesia of lesion(s). Shave biopsy at site(s) performed. Hemostasis was achieved with aluminium chloride. Petrolatum ointment and a sterile dressing were applied.  Patient Name: Aga Alanis  Date: 12/17/2020  Session Length: 30 min Session #: 1  Patient meeting with [employer]  to facilitate multicomponent behavioral intervention program for weight loss and healthy lifestyle changes prior to bariatric surgery.  Group: yes   Individual: no  Most recent weight: 181 lbs on 11/19/20    To have 12 or more sessions with final session within 2 years of surgery (includes all providers).     Health Coaching as part of [facility] Insurance Requirements.  Mandatory Areas to discuss:  Nutrition  Current Stressors/Issues:  -Drinking water in large amounts and aware that the amounts need to be smaller after surgery.  What patient does well:  -Drinks water consistently.  Previous Successes:  -Avoiding use of straws.  Areas in need of improvement:  -Drinking smaller amounts of water at a time.  Barriers to change:  -Past practice.  Reasons for change:  -Preparing for surgery.  Plan/goal for next 4 weeks:  -Sip less water more frequent to practice how to drink after surgery.   -Drink a small sip of water every 15 minutes.  (Discussed with Leila Garces RD in Nov).  -Can be a med cup every 15 minutes.    Physical activity  Current Stressors/Issues:  -Would like to try yoga for exercise program.   What patient does well:  -Self awareness, has found that yoga has been helpful in the past.  Previous Successes:  -Has tried yoga in the past.  Areas in need of improvement:  -Restarting a exercise program.  Barriers to change:  -No defined location for exercise.  -Technology not set up.  Reasons for change:  -Weight loss prior to surgery.  Plan/goal for next 2 weeks:  -To set up technology and location to use the [employer] to do a yoga session.  -To do one yoga session using the [employer].    Behavioral modification (e.g., self-monitoring, identifying barriers, and problem solving).                   Current Stressors/Issues:  -Eating 1-2 meals a day instead of 3.  What patient does  The patient tolerated the procedure and no complications were noted. The patient was provided with verbal and written post care instructions.     - Cryotherapy procedure note, location(s): above. After verbal consent and discussion of risks and benefits including, but not limited to, dyspigmentation/scar, blister, and pain, 6 lesion(s) was(were) treated with 1-2 mm freeze border for 1-2 cycles with liquid nitrogen. Post cryotherapy instructions were provided.    Follow-up: 1 year    Scribe Disclosure:  I, Chucho Shipley, am serving as a scribe to document services personally performed by Andre Santizo MD based on data collection and the provider's statements to me.     This patient was staffed with Dr. Santizo.    Shola Iglesias MD  Internal Medicine - Dermatology PGY5    Staff Physician Comments:   I saw and evaluated the patient with the resident and I agree with the assessment and plan.  I was present for the key portions of the above major procedure and examination. I have made edits if needed.    Andre Santizo MD  Staff Dermatologist and Dermatopathologist  , Department of Dermatology     ____________________________________________    [place] [place]      HPI:  Mr. Amadou Wade is a(n) extremely pleasant 73 year old male who presents today as a return patient for skin check. Last seen in dermatology by me on 7/15/2022, at which time patient received cryotherapy for treatment of AKs.    Today, notes some gritty spots on face and ears. Notes a small papule on back. The patient denies any painful, bleeding, or nonhealing lesions, or any new or changing moles. Patient is otherwise feeling well, without additional skin concerns.     Labs Reviewed:  N/A    Physical Exam:  SKIN: Total skin excluding the undergarment areas was performed. The exam included the head/face, neck, both arms, chest, back, abdomen, both legs, digits and/or nails.   -Homogenous tan macules on sun  well:  -Eats a meal after work.  Previous Successes:  -Sometimes will eat a small piece of gluten-free toast with coffee in the am to help tolerate am medication.  Areas in need of improvement:  -To be able to eat 3 meals a day.  Barriers to change:  -To discuss.  Reasons for change:  -Preparing for eating behaviors needed after surgery.  Plan/goal for next 2 weeks:  -Eat breakfast and supper daily.    Also working on:  -Slowing down pace of eating by using a smaller spoon.    Treatment Objective(s) Addressed in this Session:  ___Weight management  _x__Regular exercise  _x__Healthy eating    Discussed how sleep health and stress management health can also affect weight loss and that we can discuss those topics at a later session.    Interventions:  __x___Motivational Interviewing, MI Intervention: Expressed empathy/understanding, supported autonomy, collaboration, evocation, permission to raise concerns or advise, open-ended questions. Reflections: simple and complex, Change talk (contemplation stage), and reframe.  __x___Change Talk expressed by the Patient: Desire to change, ability to change, reasons to change, commitment to change, activation, taking steps.  __x___Provider Response to Change Talk: E- Evoked more info from patient about behavior change, A-Affirmed patient s thoughts, decisions, or attempts at behavior change. R- Reflected patient s change talk and S - Summarized patient s change talk statements.     [email or web address]                   (Effective 11/10/2020, Effective 03/16/1997, next review                  02/11/2021)     Member has participated in an intensive multicomponent                  behavioral intervention designed to help participants achieve or                  maintain weight loss through a combination of dietary changes                  and increased physical activity. This intensive multicomponent                  behavioral intervention must  exposed skin  -Scattered bright red domed papules on trunk and extremities  -Scattered waxy stuck-on papules and brown macules w/o concerning clinical features  -Few brown macuels and fleshy papules w/o concerning dermatoscopic or clinical features  -Ulcerated shiny papule on central upper abdomen  -Gritty pink papules on face and ear  -Prior bx site left cheek without e/o recurrence  -Prior site(s) of skin cancer surgery well-healed without evidence of recurrence  -Brown purple subungual discoloration of right second toenail with arthritic changes  -No other lesions of concern on areas examined.     Medications:  Current Outpatient Medications   Medication     ACE/ARB/ARNI NOT PRESCRIBED (INTENTIONAL)     apixaban ANTICOAGULANT (ELIQUIS) 5 MG tablet     diltiazem ER COATED BEADS (CARDIZEM CD) 120 MG 24 hr capsule     furosemide (LASIX) 20 MG tablet     metoprolol tartrate (LOPRESSOR) 50 MG tablet     rosuvastatin (CRESTOR) 10 MG tablet     valACYclovir (VALTREX) 1000 mg tablet     Current Facility-Administered Medications   Medication     lidocaine 1% with EPINEPHrine 1:100,000 injection 3 mL      Past Medical History:   Patient Active Problem List   Diagnosis     Cardiac dysrhythmia     Hyperlipidemia LDL goal <130     Advanced directives, counseling/discussion     ED (erectile dysfunction)     Hypertension goal BP (blood pressure) < 140/90     Recurrent cold sores     Squamous cell carcinoma in situ of skin of face     Atrial fibrillation with rapid ventricular response (H)     Atrial fibrillation (H)     CHF (congestive heart failure) (H)     HFrEF (heart failure with reduced ejection fraction) (H)     Past Medical History:   Diagnosis Date     CHF (congestive heart failure) (H) 12/2021    HFrEF - Dr Cordova     Chronic rhinitis     resolved     [place]     [facility]     HFrEF (heart failure with reduced ejection fraction) (H) 12/2021    Dr Cordova     Hypertension goal BP (blood pressure) < 140/90 12/2017      meet all of the following criteria:                  1.      Member's participation in an intensive multicomponent                  behavioral intervention must be documented in the medical                  record. Records must document compliance with the program. Note:                  A summary letter, without evidence of contemporaneous oversight,                  is not sufficient documentation. Documentation should include                  medical records of contemporaneous assessment of member's                  progress throughout the course of the nutrition and exercise                  program. For members who participate in an intensive                  multicomponent behavioral intervention (e.g., Weight Watchers,                  T[employer] Tyshawn, MediFast, OptiFast), program records documenting                  the member's participation and progress may substitute for                  medical records; and                  2.      Intensive multicomponent behavioral intervention may be                  in-person or remote, and may be group or individual-based; and                  3.      Program must be intensive (12 or more sessions over any                  duration of time) and occur within 2 years prior to surgery                  (Note: Programs may extend beyond two years if the final session                  occurred within two years prior to surgery); and                   4.      The intensive multicomponent behavioral intervention                  program must have components focusing on nutrition, physical                  activity, and behavioral modification (e.g., self-monitoring,                  identifying barriers, and problem solving). The multicomponent                  behavioral intervention program may be supervised by behavioral                  therapists, psychologists, registered dietitians, exercise                  physiologists, lifestyle coaches or other staff.                   ------------------------------------           Persistent atrial fibrillation (H) 12/2021    in/out, rate controlled, at times, Dr Montenegro     Pneumonia, organism [phone]     Snoring     no sleep apnea - mouth guard     Squamous cell carcinoma in situ of skin of face 2021    Dr Abernathy/Darlyn - Rt Chest     Unspecified hemorrhoids without mention of complication 04/2006    internal       CC Andre Santizo MD  [address] [place] [address] on close of this encounter.    This note has been created using voice recognition software; while it has been reviewed, some errors may persist.       Again, thank you for allowing me to participate in the care of your patient.        Sincerely,        Andre Santizo MD

## 2023-04-22 ENCOUNTER — HEALTH MAINTENANCE LETTER (OUTPATIENT)
Age: 43
End: 2023-04-22

## 2023-12-27 ENCOUNTER — HOSPITAL ENCOUNTER (OUTPATIENT)
Dept: MAMMOGRAPHY | Facility: CLINIC | Age: 43
Discharge: HOME | End: 2023-12-27
Attending: FAMILY MEDICINE | Admitting: FAMILY MEDICINE
Payer: COMMERCIAL

## 2023-12-27 DIAGNOSIS — Z12.31 VISIT FOR SCREENING MAMMOGRAM: ICD-10-CM

## 2023-12-27 PROCEDURE — 77063 BREAST TOMOSYNTHESIS BI: CPT

## 2024-01-24 ENCOUNTER — TRANSCRIBE ORDERS (OUTPATIENT)
Dept: OTHER | Age: 44
End: 2024-01-24

## 2024-01-24 ENCOUNTER — PRE VISIT (OUTPATIENT)
Dept: UROLOGY | Facility: CLINIC | Age: 44
End: 2024-01-24
Payer: MEDICAID

## 2024-01-24 ENCOUNTER — TELEPHONE (OUTPATIENT)
Dept: UROLOGY | Facility: CLINIC | Age: 44
End: 2024-01-24
Payer: MEDICAID

## 2024-01-24 DIAGNOSIS — R31.9 HEMATURIA, UNSPECIFIED: Primary | ICD-10-CM

## 2024-01-24 DIAGNOSIS — N12 PYELONEPHRITIS: ICD-10-CM

## 2024-01-24 NOTE — TELEPHONE ENCOUNTER
MEDICAL RECORDS REQUEST   [facility]  Urology Clinic  [address] [place] [address]  PHONE: [phone]  Fax: [phone]        FUTURE VISIT INFORMATION                                                   Aga Alanis, : 1980 scheduled for future visit at [facility]    APPOINTMENT INFORMATION:  Date: 2024  Provider:  Denisse Ho PA  Reason for Visit/Diagnosis: Gross hematuria    REFERRAL INFORMATION:  Referring provider:  Idris Reaves APRN CNP      RECORDS REQUESTED FOR VISIT                                                     NOTES  STATUS/DETAILS   OFFICE NOTE from referring provider  yes, 2024 -- Idris Reaves APRN CNP @ [facility]   OFFICE NOTE from other specialist  yes   MEDICATION LIST  yes   LABS     URINALYSIS (UA)  yes     PRE-VISIT CHECKLIST      Joint diagnostic appointment coordinated correctly          (ensure right order & amount of time) Yes   RECORD COLLECTION COMPLETE Yes

## 2024-01-24 NOTE — TELEPHONE ENCOUNTER
Reason for Visit: Consult on Gross hematuria    Diagnosis: Gross hematuria     Relevant information: [facility] Referral by Idris Reaves, APRN CNP, on 01/22/24    Records/imaging/labs/orders: All available in Epic    Pt called: No need for a call    At Rooming: Chani Higgins MA  1/24/2024  2:13 PM

## 2024-01-24 NOTE — TELEPHONE ENCOUNTER
Call returned. Per spouse, pt is still passing blood clots and urine is red. UA with micro shows RBC . No history of tobacco use. Opening with Nelly Ho PA-C tomorrow 1/25/24. Hematuria work-up reviewed.     Lorraine Cardoza CMA  01/24/24  1:37 PM

## 2024-01-24 NOTE — TELEPHONE ENCOUNTER
[facility] Call Center    Phone Message    May a detailed message be left on voicemail: yes     Reason for Call: Appointment Intake    Referring Provider Name: self referred  Diagnosis and/or Symptoms: Excessive blood in the urine  Pt  called to schedule an appt for his wife asap. She is experiencing excessive blood when she urinates. They would like to be scheduled before Feb 2nd because they will be traveling. They are open to go to any location. Writer was unable to schedule before Feb 2nd for all location. Please advise. Thank you.     Action Taken: Message routed to:  Other: uro    Travel Screening: Not Applicable

## 2024-01-25 ENCOUNTER — PRE VISIT (OUTPATIENT)
Dept: UROLOGY | Facility: CLINIC | Age: 44
End: 2024-01-25

## 2024-01-25 ENCOUNTER — ANCILLARY PROCEDURE (OUTPATIENT)
Dept: CT IMAGING | Facility: CLINIC | Age: 44
End: 2024-01-25
Attending: PHYSICIAN ASSISTANT
Payer: MEDICAID

## 2024-01-25 ENCOUNTER — OFFICE VISIT (OUTPATIENT)
Dept: UROLOGY | Facility: CLINIC | Age: 44
End: 2024-01-25
Payer: MEDICAID

## 2024-01-25 VITALS
HEART RATE: 66 BPM | DIASTOLIC BLOOD PRESSURE: 62 MMHG | SYSTOLIC BLOOD PRESSURE: 95 MMHG | RESPIRATION RATE: 16 BRPM | OXYGEN SATURATION: 96 %

## 2024-01-25 DIAGNOSIS — N12 PYELONEPHRITIS: Primary | ICD-10-CM

## 2024-01-25 DIAGNOSIS — R31.0 GROSS HEMATURIA: ICD-10-CM

## 2024-01-25 LAB
CREAT BLD-MCNC: 0.7 MG/DL (ref 0.5–1)
EGFRCR SERPLBLD CKD-EPI 2021: >60 ML/MIN/1.73M2

## 2024-01-25 PROCEDURE — 82565 ASSAY OF CREATININE: CPT | Performed by: PATHOLOGY

## 2024-01-25 PROCEDURE — 99203 OFFICE O/P NEW LOW 30 MIN: CPT | Performed by: PHYSICIAN ASSISTANT

## 2024-01-25 PROCEDURE — 74178 CT ABD&PLV WO CNTR FLWD CNTR: CPT | Mod: GC | Performed by: STUDENT IN AN ORGANIZED HEALTH CARE EDUCATION/TRAINING PROGRAM

## 2024-01-25 RX ORDER — FLUCONAZOLE 200 MG/1
200 TABLET ORAL
Qty: 2 TABLET | Refills: 0 | Status: SHIPPED | OUTPATIENT
Start: 2024-01-25 | End: 2024-05-31

## 2024-01-25 RX ORDER — CIPROFLOXACIN 500 MG/1
500 TABLET, FILM COATED ORAL 2 TIMES DAILY
Qty: 20 TABLET | Refills: 0 | Status: SHIPPED | OUTPATIENT
Start: 2024-01-25 | End: 2024-05-31

## 2024-01-25 RX ORDER — IOPAMIDOL 755 MG/ML
82 INJECTION, SOLUTION INTRAVASCULAR ONCE
Status: COMPLETED | OUTPATIENT
Start: 2024-01-25 | End: 2024-01-25

## 2024-01-25 RX ADMIN — IOPAMIDOL 82 ML: 755 INJECTION, SOLUTION INTRAVASCULAR at 12:41

## 2024-01-25 ASSESSMENT — PAIN SCALES - GENERAL: PAINLEVEL: MODERATE PAIN (4)

## 2024-01-25 NOTE — TELEPHONE ENCOUNTER
"Reason for Visit: Cystoscopy    Diagnosis: Pyelonephritis, Gross hematuria    Relevant information: Urology Referral by Nlely, on 1/26/24, Note in Epic \"to rule out foreign body such as stitch in the bladder or other pathology.\"    Records/imaging/labs/orders: All available in Epic    Pt called: No need for a call    Rooming Requirements:  UA dip prior to getting ready for cystoscopy. If positive for Leuks and/or Nitrites, will not do cystoscopy. If positive, send urine for official UA / UC.    Dylan Higgins MA  01/25/24  3:10 PM  "

## 2024-01-25 NOTE — NURSING NOTE
"Chief Complaint   Patient presents with    Consult     \"Pain, ER visit for blood in urine, partial hysterectomy no uterus, clots of blood\"       Blood pressure 95/62, pulse 66, resp. rate 16, last menstrual period 06/05/2017, SpO2 96%, not currently breastfeeding. There is no height or weight on file to calculate BMI.    Patient Active Problem List   Diagnosis    Pain in joint involving ankle and foot, right    Acute bleeding    Obesity    S/P laparoscopic sleeve gastrectomy       Allergies   Allergen Reactions    Sulfa Antibiotics Anaphylaxis     Antibiotic, but unsure which one    Codeine Nausea and Vomiting    Codeine Sulfate Nausea and Vomiting    Demerol Nausea and Vomiting    Wheat Bran Other (See Comments)     Food allergy tested and confirmed by [facility]    Wheat Gluten  [Gluten Meal] Diarrhea, GI Disturbance and Nausea    Metronidazole Rash       Current Outpatient Medications   Medication Sig Dispense Refill    acetaminophen (TYLENOL) 500 MG tablet Take 2 tablets (1,000 mg) by mouth every 8 hours 30 tablet 0    ALPRAZolam (XANAX) 0.25 MG tablet Take 0.25 mg by mouth nightly as needed for anxiety      aspirin 81 MG EC tablet Take 81 mg by mouth every morning       Biotin 10 MG TABS tablet Take 10 mg by mouth every morning       busPIRone (BUSPAR) 15 MG tablet Take 15 mg by mouth 2 times daily      cetirizine (ZYRTEC ALLERGY) 10 MG tablet Take 10 mg by mouth every morning       fluconazole (DIFLUCAN) 150 MG tablet TAKE 1 TABLET BY MOUTH ONCE      hyoscyamine (LEVSIN) 0.125 MG tablet Take 1 tablet (125 mcg) by mouth every 4 hours as needed for cramping (Patient not taking: Reported on 5/25/2021) 30 tablet 0    lactobacillus rhamnosus, GG, (CULTURELL) capsule Take 1 capsule by mouth every morning       omeprazole (PRILOSEC) 20 MG DR capsule Take 1 capsule (20 mg) by mouth every morning (before breakfast) 30 capsule 0    ondansetron (ZOFRAN-ODT) 4 MG ODT tab Take 1 tablet (4 mg) by mouth every 6 hours as " needed for nausea or vomiting (Patient not taking: Reported on 5/25/2021) 15 tablet 0    oxyCODONE (ROXICODONE) 5 MG tablet Take 1 tablet (5 mg) by mouth every 4 hours as needed for severe pain (Patient not taking: Reported on 5/25/2021) 12 tablet 0    prochlorperazine (COMPAZINE) 10 MG tablet Take 1 tablet (10 mg) by mouth every 6 hours as needed for nausea or vomiting (Patient not taking: Reported on 5/25/2021) 12 tablet 0    senna-docusate (SENOKOT-S/PERICOLACE) 8.6-50 MG tablet Take 2 tablets by mouth 2 times daily (Patient not taking: Reported on 5/25/2021) 20 tablet 0    SOLRIAMFETOL 75 MG TABS tablet Take 0.5 tablets by mouth every morning       spironolactone (ALDACTONE) 100 MG tablet Take 100 mg by mouth every morning       ursodiol (ACTIGALL) 300 MG capsule Take 1 capsule (300 mg) by mouth 2 times daily 60 capsule 5    valACYclovir (VALTREX) 1000 mg tablet Take 2,000 mg by mouth as needed For 3 days      VIIBRYD 40 MG TABS tablet Take 40 mg by mouth every morning          Social History     Tobacco Use    Smoking status: Never    Smokeless tobacco: Never   Substance Use Topics    Alcohol use: Yes    Drug use: No       Yvrose Graham LPN  1/25/2024  9:00 AM

## 2024-01-25 NOTE — DISCHARGE INSTRUCTIONS

## 2024-01-25 NOTE — LETTER
2024       RE: Aga Alanis  [address]  [facility] [address]     Dear Colleague,    Thank you for referring your patient, Aga Alanis, to the [facility] [place] at [facility]. Please see a copy of my visit note below.    It was my pleasure to meet Ms. Aga Alanis, a 43 year old year old female seen in consultation today for chief complaint: No chief complaint on file.    Today she is accompanied by her SO    HPI: Ms. Aga Alanis has PMH significant for GERD, NNEKA, S/p lap hysterectomy (for heavy menses) c/p vaginal cuff bleed requiring takeback for repair (), s/p Lap Gastric Sleeve (),     Treated with Keflex tid x 10 days in Novemeber - Macrobid then Keflex 500 bid then Keflex 500mg TID.      Developed mild dysuria on .  Then on  developed gross hematuria with clots..  Blood with voiding, large clots in toilet, when she wiped. But also pale pink on pads between voids (which she attributed to mild incontinence in the setting of UTI).  Then seen again 24 with UTI symptoms and given 1g Rocephin then started on Keflex TID x 10.  UA was suggestive of UTI but urine culture was negative.  Hematuria resolved after starting Abx but left flank pain persists and may be worsening. Also having left groin pain.     Has never had gross hematuria previously No nausea, emesis. No fevers, chills.   Did have a kidney stone that passed on its own (at least 15 years ago)     Never smoker  Works as . 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    Bowels - generally more constipated.    Sometimes struggles with bladder emptying,  particularly when she has UTIs.  No incontinence unless she has UTIs  Gyne: 3x , now s/p hysterectomy   Has a cruise planned to [place] next week.     REVIEW OF DIAGNOSTICS:  24 - UA showed RBC , WBC , neg nitrites. Urine culture negative <  1000  12/27/23 - CT AP without contrast: no urinary calculi.  Simple appearing 4.9cm left ovarian cyst, moderate-sized hiatal hernia.   12/13/23 - UA negative  11/27/23 - Nitrite positive, trace occult blood (RBC 0-2)  11/20/23 - UA - trace ketones, otherwise negative.   11/16/23 - UA - positive nitrites, otherwise negative.   09/13/23 - UA negative except Ketones 15mg/dL  1/26/22 - UA negative except trace ketones  1/11/22 - UA negative except positive nitrites    Past Medical History:   Diagnosis Date    Depression     DVT (deep venous thrombosis) (H)     Gastroesophageal reflux disease with esophagitis     NNEKA (obstructive sleep apnea)     used CPAP at higher BMI; has stopped using since BMI reduced    Ovarian cyst        Past Surgical History:   Procedure Laterality Date    COSMETIC SURGERY      breast augmentation and tummy tuck 2017    ENT SURGERY      tubes     EXAM UNDER ANESTHESIA PELVIC N/A 12/29/2018    Procedure: Vaginal exam under anesthesia, repair of vaginal cuff;  Surgeon: Bev Fischer MD;  Location: [facility] OR    GYN SURGERY      ovarian cyst , tubes tied     HYSTERECTOMY      LAPAROSCOPIC GASTRIC SLEEVE N/A 4/26/2021    Procedure: GASTRECTOMY, SLEEVE, LAPAROSCOPIC;  Surgeon: Davi Dee MD;  [facility]    LAPAROSCOPIC HERNIORRHAPHY HIATAL N/A 4/26/2021    Procedure: HERNIORRHAPHY, HIATAL, LAPAROSCOPIC;  Surgeon: Davi Dee MD;  [facility]: [facility] OR    ORTHOPEDIC SURGERY      knee right     REPAIR LACERATION VAGINA N/A 12/29/2018    Procedure: Repair of vaginal laceration;  Surgeon: Bev Fischer MD;  Location:  OR       FAMILY HISTORY: Denies family history of urologic cancer.   Maternal grandmother and both maternal aunts - Br Ca  Maternal aunt - ovarian cancer  Maternal uncle - polyps in colon.   Grandma - small cell carcinoma of the bladder (Hx smoking)    SOCIAL HISTORY:    reports that she has never smoked. She has never used smokeless tobacco.    Current Outpatient  Medications   Medication Sig Dispense Refill    acetaminophen (TYLENOL) 500 MG tablet Take 2 tablets (1,000 mg) by mouth every 8 hours 30 tablet 0    ALPRAZolam (XANAX) 0.25 MG tablet Take 0.25 mg by mouth nightly as needed for anxiety      aspirin 81 MG EC tablet Take 81 mg by mouth every morning       Biotin 10 MG TABS tablet Take 10 mg by mouth every morning       busPIRone (BUSPAR) 15 MG tablet Take 15 mg by mouth 2 times daily      cetirizine (ZYRTEC ALLERGY) 10 MG tablet Take 10 mg by mouth every morning       fluconazole (DIFLUCAN) 150 MG tablet TAKE 1 TABLET BY MOUTH ONCE      hyoscyamine (LEVSIN) 0.125 MG tablet Take 1 tablet (125 mcg) by mouth every 4 hours as needed for cramping (Patient not taking: Reported on 5/25/2021) 30 tablet 0    lactobacillus rhamnosus, GG, (CULTURELL) capsule Take 1 capsule by mouth every morning       omeprazole (PRILOSEC) 20 MG DR capsule Take 1 capsule (20 mg) by mouth every morning (before breakfast) 30 capsule 0    ondansetron (ZOFRAN-ODT) 4 MG ODT tab Take 1 tablet (4 mg) by mouth every 6 hours as needed for nausea or vomiting (Patient not taking: Reported on 5/25/2021) 15 tablet 0    oxyCODONE (ROXICODONE) 5 MG tablet Take 1 tablet (5 mg) by mouth every 4 hours as needed for severe pain (Patient not taking: Reported on 5/25/2021) 12 tablet 0    prochlorperazine (COMPAZINE) 10 MG tablet Take 1 tablet (10 mg) by mouth every 6 hours as needed for nausea or vomiting (Patient not taking: Reported on 5/25/2021) 12 tablet 0    senna-docusate (SENOKOT-S/PERICOLACE) 8.6-50 MG tablet Take 2 tablets by mouth 2 times daily (Patient not taking: Reported on 5/25/2021) 20 tablet 0    SOLRIAMFETOL 75 MG TABS tablet Take 0.5 tablets by mouth every morning       spironolactone (ALDACTONE) 100 MG tablet Take 100 mg by mouth every morning       ursodiol (ACTIGALL) 300 MG capsule Take 1 capsule (300 mg) by mouth 2 times daily 60 capsule 5    valACYclovir (VALTREX) 1000 mg tablet Take 2,000 mg  by mouth as needed For 3 days      VIIBRYD 40 MG TABS tablet Take 40 mg by mouth every morning          ALLERGIES: Sulfa antibiotics, Codeine, Codeine sulfate, Demerol, Wheat bran, Wheat gluten  [gluten meal], and Metronidazole      REVIEW OF SYSTEMS:  As above in HPI     GENERAL PHYSICAL EXAM:   Vitals: BP 95/62   Pulse 66   Resp 16   LMP 06/05/2017   SpO2 96%   There is no height or weight on file to calculate BMI.    GENERAL: Well groomed, well developed, well nourished female in NAD.  GI: Soft, ND, no palpable masses.  + mild left lower quadrant tenderness.   + mild left CVAT.  No right CVAT  MS: Full ROM in extremities, gait normal, normal muscle tone  SKIN: Warm to touch, dry.  No visible rashes or lesions on examined areas.  HEMATOLOGIC/LYMPHATIC/IMMUNOLOGIC: normal ant/post cervical, supraclavicular and inguinal nodes.  No LE edema.  NEURO: Alert and oriented x 3.  PSYCH: Normal mood and affect, pleasant and agreeable during interview and exam.    RADIOLOGY: The following tests were reviewed:   EXAM: CT ABDOMEN PELVIS STONE PROTOCOL WO   LOCATION: [facility] MEDICAL IMAGING   DATE/TIME: 12/18/2020 9:28 AM     INDICATION: Left flank pain. Assess for stone.   COMPARISON: 08/20/2009   TECHNIQUE: CT scan of the abdomen and pelvis was performed without oral or IV contrast. Multiplanar reformats were obtained. Dose reduction techniques were used.   CONTRAST: None.     FINDINGS:   LOWER CHEST: Moderate-sized hiatal hernia. Lung bases clear. Postoperative change in both breasts.   HEPATOBILIARY: Normal.   PANCREAS: Normal.   SPLEEN: Normal.   ADRENAL GLANDS: Normal.  KIDNEY/BLADDER: No urinary calculi and no hydronephrosis bilaterally. Smooth renal contours.   BOWEL: Normal with no obstruction or acute inflammatory change. Nothing for appendicitis.   LYMPH NODES: Normal.   VASCULATURE: Normal caliber abdominal aorta.     PELVIC ORGANS: Interval hysterectomy. Simple appearing 4.5 x 4.6 x 4.9 cm left ovarian  cyst. This is not well characterized without IV contrast. No right adnexal mass.     MUSCULOSKELETAL: Normal.     IMPRESSION:   1.  No urinary calculi and no hydronephrosis bilaterally.   2.  Simple appearing 4.9 cm left ovarian cyst. Recommend further characterization with pelvic ultrasound.   3.  Hysterectomy.   4.  Moderate-sized hiatal hernia.     LABS: The last test results for Ms. Aga Alanis were reviewed.   BMP -   Recent Labs   Lab Test 09/11/21  1152 07/26/21  1216 04/26/21  1445 02/17/21  1012    140  --  138   POTASSIUM 3.6 4.4  --  4.2   CHLORIDE 106 108  --  105   CO2 29 30  --  29   BUN 8 6*  --  11   CR 0.81 0.79 0.66 0.82   GLC 76 82  --  87   SUMEET 9.1 9.2  --  9.6       CBC -   Recent Labs   Lab Test 09/11/21  1151 07/26/21  1216 04/26/21  1445 02/17/21  1012   WBC 5.1 5.0  --  4.8   HGB 13.7 14.0  --  14.0    236 233 291       ASSESSMENT:   1) Gross hematuria - in the setting of a negative urine culture   2) Frequent UTIs  3) Hx lap hysterectomy with vaginal cuff bleed requiring emergency surgery   4) Persistent left flank pain/ left groin pain  - on spironolactone    PLAN:   - No need for another urine specimen today (patient is on keflex right now)   - Start Cipro 500mg twice daily x 10 days.  OK to stop antibiotics after 7 days if your pain is 100% gone.  Otherwise finish the full 10d course.  Seek medical attention if you develop worsened pain, nausea, vomitting or fevers, chills.   - Diflucan 200mg as needed for UTIs.   - Set up cystoscopy (to rule out foreign body such as stitch in the bladder or other pathology)  - Set up CT urogram asap.  Concern for ureteral stone, particularly.  If there is a ureteral stone will need to start daily flomax (tamsulosin) to help the stone pass.   - Follow up within 3 months with RICHARD Conklin for frequent UTIs.      VISIT DURATION:32 minutes (9:04 - 9:33 + 5 minutes documentation on DOS)    Nelly Ho PA-C  Department of Urologic  Surgery

## 2024-01-25 NOTE — PATIENT INSTRUCTIONS
PLAN:   - No need for another urine specimen today (patient is on keflex right now)   - Start Cipro 500mg twice daily x 10 days.  OK to stop antibiotics after 7 days if your pain is 100% gone.  Otherwise finish the full 10d course.  Seek medical attention if you develop worsened pain, nausea, vomitting or fevers, chills.   - Set up cystoscopy (to rule out foreign body such as stitch in the bladder or other pathology)  - Set up CT urogram asap.  Concern for ureteral stone, particularly.  If there is a ureteral stone will need to start daily flomax (tamsulosin) to help the stone pass.   - Follow up within 3 months with RICHARD Conklin for frequent UTIs.      [facility]

## 2024-01-25 NOTE — PROGRESS NOTES
"ADDENDUM 1/29/24 at 9:14am:  CT urogram completed 1/25/24 and read 1/26/24.  I called Aga on that day to discuss results.   I sent her a message via [employer] today to review our discussion.  Note that I am using alfuzosin rather than tamsulosin because of severe sulfa allergy.   Here is Aga's update problem list:  ASSESSMENT:   1) Gross hematuria - in the setting of a negative urine culture   2) Frequent UTIs - most recent culture negative  3) Hx lap hysterectomy with vaginal cuff bleed requiring emergency surgery   4) Persistent left flank pain/ left groin pain  - on spironolactone  5) BL hydronephrosis with radiologic concern for BL ureteroceles - normal SCr  6) Right 3mm mid-ureteral stone     \"Your kidneys are both a little swollen (hydronephrosis), and the radiologist mentions that this may be because of ureteroceles in the region where your ureter tubes meet your bladder.  We will need to further investigate this concept with a 1) voiding cystourethrogram (XRAY test where your bladder is filled with contrast and you attempt to urinate) and 2) cystoscopy (visual inspection of the inside of your bladder.       Given this concern for ureteroceles, I would like you to have your cystoscopy with a urologist rather than with Dr. Beasley (since he is a urogynecologist).  I am going to ask my team to reschedule you for cystoscopy with Dr. Stoddard.  Please let me know if no one contacts you in the next couple days.  I want to make sure this gets done for you.     There is a small stone in the ureter that drains your right kidney.  It is small and hopefully will pass on its own.  Please strain your urine if you can to collect any stone fragments.  Start a medication called alfuzosin 10mg taken before bed - to potentially help this stone pass.  I will send this prescription to [facility] [place]. If alfuzosin makes you lightheaded with standing, go ahead and stop the medication.     Your kidney function is " "currently normal (1/25/24 creatinine 0.7mg/dL).       There is a 4cm cyst in the region of your left ovary.  I would recommend that you discuss that finding with your gynecologist.  It sounds like you have commonly had cysts in the past.     There is a <1cm region in your L1 (lumbar vertebrae) that the radiologist calls a Tarlov cyst (in the body of the CT report) and an indeterminate sclerotic focus in the \"impression\" portion of the CT report.  I would recommend that you discuss this finding with your primary care provider to see if any additional imaging is warranted.       Regarding your antibiotics ... Since you have a history of UTIs and are currently having worsened left flank pain I would recommend that you go ahead and finish the Cipro prescription.  While on Cipro you do not need to take Cephalexin.  Save the cephalexin and bring it with you on vacation in case you have any mild symptoms of UTI.  If you develop fevers, chills, or worsening flank pain in [place] you may end up seeing a local medical provider.       Please let me know if you have questions.  I will send the alfuzosin to your pharmacy.  I will ask my team to reschedule your cystoscopy and also to set you up for a voiding cystourethrogram.\"     =================================================================================================================================      It was my pleasure to meet Ms. Aga Alanis, a 43 year old year old female seen in consultation today for chief complaint: Consult (\"Pain, ER visit for blood in urine, partial hysterectomy no uterus, clots of blood\")    Today she is accompanied by her SO    HPI: Ms. Aga Alanis has PMH significant for GERD, NNEKA, S/p lap hysterectomy (for heavy menses) c/p vaginal cuff bleed requiring takeback for repair (2018), s/p Lap Gastric Sleeve (2021),     Treated with Keflex tid x 10 days in Novemeber - Macrobid then Keflex 500 bid then Keflex 500mg TID.      Developed " mild dysuria on .  Then on  developed gross hematuria with clots..  Blood with voiding, large clots in toilet, when she wiped. But also pale pink on pads between voids (which she attributed to mild incontinence in the setting of UTI).  Then seen again 24 with UTI symptoms and given 1g Rocephin then started on Keflex TID x 10.  UA was suggestive of UTI but urine culture was negative.  Hematuria resolved after starting Abx but left flank pain persists and may be worsening. Also having left groin pain.     Has never had gross hematuria previously No nausea, emesis. No fevers, chills.   Did have a kidney stone that passed on its own (at least 15 years ago)     Never smoker  Works as . 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    Bowels - generally more constipated.    Sometimes struggles with bladder emptying,  particularly when she has UTIs.  No incontinence unless she has UTIs  Gyne: 3x , now s/p hysterectomy   Has a cruise planned to [place] next week.     REVIEW OF DIAGNOSTICS:  24 - UA showed RBC , WBC , neg nitrites. Urine culture negative < 1000  23 - CT AP without contrast: no urinary calculi.  Simple appearing 4.9cm left ovarian cyst, moderate-sized hiatal hernia.   23 - UA negative  23 - Nitrite positive, trace occult blood (RBC 0-2)  23 - UA - trace ketones, otherwise negative.   23 - UA - positive nitrites, otherwise negative.   23 - UA negative except Ketones 15mg/dL  22 - UA negative except trace ketones  22 - UA negative except positive nitrites    Past Medical History:   Diagnosis Date    Depression     DVT (deep venous thrombosis) (H)     Gastroesophageal reflux disease with esophagitis     NNEKA (obstructive sleep apnea)     used CPAP at higher BMI; has stopped using since BMI reduced    Ovarian cyst        Past Surgical History:   Procedure Laterality Date    COSMETIC SURGERY      breast augmentation and tummy  bebe 2017    ENT SURGERY      tubes     EXAM UNDER ANESTHESIA PELVIC N/A 12/29/2018    Procedure: Vaginal exam under anesthesia, repair of vaginal cuff;  Surgeon: Bev Fischer MD;  Location: [facility] OR    GYN SURGERY      ovarian cyst , tubes tied     HYSTERECTOMY      LAPAROSCOPIC GASTRIC SLEEVE N/A 4/26/2021    Procedure: GASTRECTOMY, SLEEVE, LAPAROSCOPIC;  Surgeon: Davi Dee MD;  [facility]    LAPAROSCOPIC HERNIORRHAPHY HIATAL N/A 4/26/2021    Procedure: HERNIORRHAPHY, HIATAL, LAPAROSCOPIC;  Surgeon: Davi Dee MD;  [facility]    ORTHOPEDIC SURGERY      knee right     REPAIR LACERATION VAGINA N/A 12/29/2018    Procedure: Repair of vaginal laceration;  Surgeon: Bev Fischer MD;  Location: [facility] OR       FAMILY HISTORY: Denies family history of urologic cancer.   Maternal grandmother and both maternal aunts - Br Ca  Maternal aunt - ovarian cancer  Maternal uncle - polyps in colon.   Grandma - small cell carcinoma of the bladder (Hx smoking)    SOCIAL HISTORY:    reports that she has never smoked. She has never used smokeless tobacco.    Current Outpatient Medications   Medication Sig Dispense Refill    acetaminophen (TYLENOL) 500 MG tablet Take 2 tablets (1,000 mg) by mouth every 8 hours 30 tablet 0    ALPRAZolam (XANAX) 0.25 MG tablet Take 0.25 mg by mouth nightly as needed for anxiety      aspirin 81 MG EC tablet Take 81 mg by mouth every morning       Biotin 10 MG TABS tablet Take 10 mg by mouth every morning       busPIRone (BUSPAR) 15 MG tablet Take 15 mg by mouth 2 times daily      cetirizine (ZYRTEC ALLERGY) 10 MG tablet Take 10 mg by mouth every morning       fluconazole (DIFLUCAN) 150 MG tablet TAKE 1 TABLET BY MOUTH ONCE      hyoscyamine (LEVSIN) 0.125 MG tablet Take 1 tablet (125 mcg) by mouth every 4 hours as needed for cramping (Patient not taking: Reported on 5/25/2021) 30 tablet 0    lactobacillus rhamnosus, GG, (CULTURELL) capsule Take 1 capsule by mouth every morning        omeprazole (PRILOSEC) 20 MG DR capsule Take 1 capsule (20 mg) by mouth every morning (before breakfast) 30 capsule 0    ondansetron (ZOFRAN-ODT) 4 MG ODT tab Take 1 tablet (4 mg) by mouth every 6 hours as needed for nausea or vomiting (Patient not taking: Reported on 5/25/2021) 15 tablet 0    oxyCODONE (ROXICODONE) 5 MG tablet Take 1 tablet (5 mg) by mouth every 4 hours as needed for severe pain (Patient not taking: Reported on 5/25/2021) 12 tablet 0    prochlorperazine (COMPAZINE) 10 MG tablet Take 1 tablet (10 mg) by mouth every 6 hours as needed for nausea or vomiting (Patient not taking: Reported on 5/25/2021) 12 tablet 0    senna-docusate (SENOKOT-S/PERICOLACE) 8.6-50 MG tablet Take 2 tablets by mouth 2 times daily (Patient not taking: Reported on 5/25/2021) 20 tablet 0    SOLRIAMFETOL 75 MG TABS tablet Take 0.5 tablets by mouth every morning       spironolactone (ALDACTONE) 100 MG tablet Take 100 mg by mouth every morning       ursodiol (ACTIGALL) 300 MG capsule Take 1 capsule (300 mg) by mouth 2 times daily 60 capsule 5    valACYclovir (VALTREX) 1000 mg tablet Take 2,000 mg by mouth as needed For 3 days      VIIBRYD 40 MG TABS tablet Take 40 mg by mouth every morning          ALLERGIES: Sulfa antibiotics, Codeine, Codeine sulfate, Demerol, Wheat bran, Wheat gluten  [gluten meal], and Metronidazole      REVIEW OF SYSTEMS:  As above in HPI     GENERAL PHYSICAL EXAM:   Vitals: BP 95/62   Pulse 66   Resp 16   LMP 06/05/2017   SpO2 96%   There is no height or weight on file to calculate BMI.    GENERAL: Well groomed, well developed, well nourished female in NAD.  GI: Soft, ND, no palpable masses.  + mild left lower quadrant tenderness.   + mild left CVAT.  No right CVAT  MS: Full ROM in extremities, gait normal, normal muscle tone  SKIN: Warm to touch, dry.  No visible rashes or lesions on examined areas.  HEMATOLOGIC/LYMPHATIC/IMMUNOLOGIC: normal ant/post cervical, supraclavicular and inguinal nodes.  No  LE edema.  NEURO: Alert and oriented x 3.  PSYCH: Normal mood and affect, pleasant and agreeable during interview and exam.    RADIOLOGY: The following tests were reviewed:   EXAM: CT ABDOMEN PELVIS STONE PROTOCOL WO   LOCATION: [facility] IMAGING   DATE/TIME: 12/18/2020 9:28 AM     INDICATION: Left flank pain. Assess for stone.   COMPARISON: 08/20/2009   TECHNIQUE: CT scan of the abdomen and pelvis was performed without oral or IV contrast. Multiplanar reformats were obtained. Dose reduction techniques were used.   CONTRAST: None.     FINDINGS:   LOWER CHEST: Moderate-sized hiatal hernia. Lung bases clear. Postoperative change in both breasts.   HEPATOBILIARY: Normal.   PANCREAS: Normal.   SPLEEN: Normal.   ADRENAL GLANDS: Normal.  KIDNEY/BLADDER: No urinary calculi and no hydronephrosis bilaterally. Smooth renal contours.   BOWEL: Normal with no obstruction or acute inflammatory change. Nothing for appendicitis.   LYMPH NODES: Normal.   VASCULATURE: Normal caliber abdominal aorta.     PELVIC ORGANS: Interval hysterectomy. Simple appearing 4.5 x 4.6 x 4.9 cm left ovarian cyst. This is not well characterized without IV contrast. No right adnexal mass.     MUSCULOSKELETAL: Normal.     IMPRESSION:   1.  No urinary calculi and no hydronephrosis bilaterally.   2.  Simple appearing 4.9 cm left ovarian cyst. Recommend further characterization with pelvic ultrasound.   3.  Hysterectomy.   4.  Moderate-sized hiatal hernia.     LABS: The last test results for Ms. Aga Alanis were reviewed.   BMP -   Recent Labs   Lab Test 09/11/21  1152 07/26/21  1216 04/26/21  1445 02/17/21  1012    140  --  138   POTASSIUM 3.6 4.4  --  4.2   CHLORIDE 106 108  --  105   CO2 29 30  --  29   BUN 8 6*  --  11   CR 0.81 0.79 0.66 0.82   GLC 76 82  --  87   SUMEET 9.1 9.2  --  9.6       CBC -   Recent Labs   Lab Test 09/11/21  1151 07/26/21  1216 04/26/21  1445 02/17/21  1012   WBC 5.1 5.0  --  4.8   HGB 13.7 14.0  --   14.0    236 233 291       ASSESSMENT:   1) Gross hematuria - in the setting of a negative urine culture   2) Frequent UTIs  3) Hx lap hysterectomy with vaginal cuff bleed requiring emergency surgery   4) Persistent left flank pain/ left groin pain  - on spironolactone    PLAN:   - No need for another urine specimen today (patient is on keflex right now)   - Start Cipro 500mg twice daily x 10 days.  OK to stop antibiotics after 7 days if your pain is 100% gone.  Otherwise finish the full 10d course.  Seek medical attention if you develop worsened pain, nausea, vomitting or fevers, chills.   - Diflucan 200mg as needed for UTIs.   - Set up cystoscopy (to rule out foreign body such as stitch in the bladder or other pathology)  - Set up CT urogram asap.  Concern for ureteral stone, particularly.  If there is a ureteral stone will need to start daily flomax (tamsulosin) to help the stone pass.   - Follow up within 3 months with RICHARD Conklin for frequent UTIs.      VISIT DURATION:32 minutes (9:04 - 9:33 + 5 minutes documentation on DOS)    Nelly Ho PA-C  Department of Urologic Surgery

## 2024-01-26 ENCOUNTER — TELEPHONE (OUTPATIENT)
Dept: SURGERY | Facility: CLINIC | Age: 44
End: 2024-01-26
Payer: MEDICAID

## 2024-01-26 DIAGNOSIS — N20.0 NEPHROLITHIASIS: Primary | ICD-10-CM

## 2024-01-26 LAB — RADIOLOGIST FLAGS: ABNORMAL

## 2024-01-29 DIAGNOSIS — N13.30 BILATERAL HYDRONEPHROSIS: ICD-10-CM

## 2024-01-29 DIAGNOSIS — N20.1 URETERAL STONE: Primary | ICD-10-CM

## 2024-01-29 RX ORDER — ALFUZOSIN HYDROCHLORIDE 10 MG/1
10 TABLET, EXTENDED RELEASE ORAL AT BEDTIME
Qty: 60 TABLET | Refills: 1 | Status: SHIPPED | OUTPATIENT
Start: 2024-01-29 | End: 2024-05-31

## 2024-01-31 ENCOUNTER — TELEPHONE (OUTPATIENT)
Dept: UROLOGY | Facility: CLINIC | Age: 44
End: 2024-01-31
Payer: MEDICAID

## 2024-02-01 ENCOUNTER — TELEPHONE (OUTPATIENT)
Dept: UROLOGY | Facility: CLINIC | Age: 44
End: 2024-02-01
Payer: MEDICAID

## 2024-02-01 DIAGNOSIS — R31.0 GROSS HEMATURIA: Primary | ICD-10-CM

## 2024-02-02 ENCOUNTER — TELEPHONE (OUTPATIENT)
Dept: UROLOGY | Facility: CLINIC | Age: 44
End: 2024-02-02
Payer: MEDICAID

## 2024-02-02 DIAGNOSIS — N20.1 URETERAL STONE: Primary | ICD-10-CM

## 2024-02-02 RX ORDER — CIPROFLOXACIN 500 MG/1
500 TABLET, FILM COATED ORAL 2 TIMES DAILY
Qty: 6 TABLET | Refills: 0 | Status: SHIPPED | OUTPATIENT
Start: 2024-02-02 | End: 2024-02-05

## 2024-02-02 NOTE — TELEPHONE ENCOUNTER
[facility] Call Center    Phone Message    May a detailed message be left on voicemail: no     Reason for Call: Other: Patient forgot her antibiotics at home and she is wondering if she can get them before her cruise tomorrow. Please send to [address] [facility]. [facility] in [place] [phone].      Action Taken: Message routed to:  [facility] ([facility] Urology    Travel Screening: Not Applicable

## 2024-02-22 ENCOUNTER — PRE VISIT (OUTPATIENT)
Dept: UROLOGY | Facility: CLINIC | Age: 44
End: 2024-02-22
Payer: MEDICAID

## 2024-02-22 NOTE — TELEPHONE ENCOUNTER
Reason for visit: cystoscopy      Dx/Hx/Sx:  to rule out foreign body such as stitch in the bladder  per apt note     Records/imaging/labs/orders: imaging scheduled for 3/5/24    At Rooming: standard - consent

## 2024-03-05 ENCOUNTER — ANCILLARY PROCEDURE (OUTPATIENT)
Dept: GENERAL RADIOLOGY | Facility: CLINIC | Age: 44
End: 2024-03-05
Attending: PHYSICIAN ASSISTANT
Payer: MEDICAID

## 2024-03-05 DIAGNOSIS — N13.30 BILATERAL HYDRONEPHROSIS: ICD-10-CM

## 2024-03-05 PROCEDURE — 74455 X-RAY URETHRA/BLADDER: CPT | Mod: GC | Performed by: RADIOLOGY

## 2024-03-05 PROCEDURE — 51600 INJECTION FOR BLADDER X-RAY: CPT | Mod: GC | Performed by: RADIOLOGY

## 2024-03-06 ENCOUNTER — OFFICE VISIT (OUTPATIENT)
Dept: UROLOGY | Facility: CLINIC | Age: 44
End: 2024-03-06
Payer: MEDICAID

## 2024-03-06 VITALS
DIASTOLIC BLOOD PRESSURE: 71 MMHG | WEIGHT: 126 LBS | HEIGHT: 64 IN | BODY MASS INDEX: 21.51 KG/M2 | HEART RATE: 83 BPM | SYSTOLIC BLOOD PRESSURE: 104 MMHG

## 2024-03-06 DIAGNOSIS — R31.0 GROSS HEMATURIA: Primary | ICD-10-CM

## 2024-03-06 DIAGNOSIS — Q62.31 URETEROCELE, CONGENITAL: ICD-10-CM

## 2024-03-06 PROCEDURE — 52000 CYSTOURETHROSCOPY: CPT | Performed by: UROLOGY

## 2024-03-06 RX ORDER — CEFAZOLIN SODIUM 2 G/50ML
2 SOLUTION INTRAVENOUS SEE ADMIN INSTRUCTIONS
Status: CANCELLED | OUTPATIENT
Start: 2024-03-06

## 2024-03-06 RX ORDER — LIDOCAINE HYDROCHLORIDE 20 MG/ML
JELLY TOPICAL ONCE
Status: COMPLETED | OUTPATIENT
Start: 2024-03-06 | End: 2024-03-06

## 2024-03-06 RX ORDER — CEFAZOLIN SODIUM 2 G/50ML
2 SOLUTION INTRAVENOUS
Status: CANCELLED | OUTPATIENT
Start: 2024-03-06

## 2024-03-06 RX ADMIN — LIDOCAINE HYDROCHLORIDE: 20 JELLY TOPICAL at 08:57

## 2024-03-06 ASSESSMENT — PAIN SCALES - GENERAL: PAINLEVEL: MILD PAIN (2)

## 2024-03-06 NOTE — NURSING NOTE
"Chief Complaint   Patient presents with    Cystoscopy       Blood pressure 104/71, pulse 83, height 1.626 m (5' 4\"), weight 57.2 kg (126 lb), last menstrual period 06/05/2017, not currently breastfeeding. Body mass index is 21.63 kg/m .    Patient Active Problem List   Diagnosis    Pain in joint involving ankle and foot, right    Acute bleeding    Obesity    S/P laparoscopic sleeve gastrectomy       Allergies   Allergen Reactions    Sulfa Antibiotics Anaphylaxis     Antibiotic, but unsure which one    Codeine Nausea and Vomiting    Codeine Sulfate Nausea and Vomiting    Demerol Nausea and Vomiting    Wheat Bran Other (See Comments)     Food allergy tested and confirmed by [facility]    Wheat Gluten  [Gluten Meal] Diarrhea, GI Disturbance and Nausea    Metronidazole Rash       Current Outpatient Medications   Medication Sig Dispense Refill    acetaminophen (TYLENOL) 500 MG tablet Take 2 tablets (1,000 mg) by mouth every 8 hours 30 tablet 0    alfuzosin ER (UROXATRAL) 10 MG 24 hr tablet Take 1 tablet (10 mg) by mouth at bedtime (While you have the ureteral stone) 60 tablet 1    ALPRAZolam (XANAX) 0.25 MG tablet Take 0.25 mg by mouth nightly as needed for anxiety      aspirin 81 MG EC tablet Take 81 mg by mouth every morning       Biotin 10 MG TABS tablet Take 10 mg by mouth every morning       busPIRone (BUSPAR) 15 MG tablet Take 15 mg by mouth 2 times daily      cetirizine (ZYRTEC ALLERGY) 10 MG tablet Take 10 mg by mouth every morning       ciprofloxacin (CIPRO) 500 MG tablet Take 1 tablet (500 mg) by mouth 2 times daily 20 tablet 0    fluconazole (DIFLUCAN) 150 MG tablet TAKE 1 TABLET BY MOUTH ONCE      fluconazole (DIFLUCAN) 200 MG tablet Take 1 tablet (200 mg) by mouth every 3 days As needed for vaginal yeast 2 tablet 0    lactobacillus rhamnosus, GG, (CULTURELL) capsule Take 1 capsule by mouth every morning       SOLRIAMFETOL 75 MG TABS tablet Take 0.5 tablets by mouth every morning       spironolactone " (ALDACTONE) 100 MG tablet Take 100 mg by mouth every morning       valACYclovir (VALTREX) 1000 mg tablet Take 2,000 mg by mouth as needed For 3 days      VIIBRYD 40 MG TABS tablet Take 40 mg by mouth every morning          Social History     Tobacco Use    Smoking status: Never    Smokeless tobacco: Never   Substance Use Topics    Alcohol use: Yes    Drug use: No       Invasive Procedure Safety Checklist:    Procedure: Cystoscopy    Action: Complete sections and checkboxes as appropriate.    Pre-procedure:  1. Patient ID Verified with 2 identifiers (Michelle and  or MRN) : YES    2. Procedure and site verified with patient/designee (when able) : YES    3. Accurate consent documentation in medical record : YES    4. H&P (or appropriate assessment) documented in medical record : N/A  H&P must be up to 30 days prior to procedure an updated within 24 hours of                 Procedure as applicable.     5. Relevant diagnostic and radiology test results appropriately labeled and displayed as applicable : YES    6. Blood products, implants, devices, and/or special equipment available for the procedure as applicable : YES    7. Procedure site(s) marked with provider initials [Exclusions: none] : NO    8. Marking not required. Reason : Yes  Procedure does not require site marking    Time Out:     Time-Out performed immediately prior to starting procedure, including verbal and active participation of all team members addressing: YES    1. Correct patient identity.  2. Confirmed that the correct side and site are marked.  3. An accurate procedure to be done.  4. Agreement on the procedure to be done.  5. Correct patient position.  6. Relevant images and results are properly labeled and appropriately displayed.  7. The need to administer antibiotics or fluids for irrigation purposes during the procedure as applicable.  8. Safety precautions based on patient history or medication use.    During Procedure: Verification of correct  person, site, and procedure occurs any time the responsibility for care of the patient is transferred to another member of the care team.    The following medication was given:     MEDICATION:  Lidocaine without epinephrine 2% jelly  ROUTE: urethral   SITE: urethral   DOSE: 10 mL  LOT #: [id number]  : [employer]  EXPIRATION DATE: 9-25  NDC#: [address]-2     Was there drug waste? No    Prior to med admin, verified patient identity using patient's name and date of birth.  Due to med administration, patient instructed to remain in clinic for 15 minutes  afterwards, and to report any adverse reaction to me immediately.    Drug Amount Wasted:  None.  Vial/Syringe: Syringe      Latonia Rojas  3/6/2024  8:11 AM

## 2024-03-06 NOTE — PROGRESS NOTES
Cystoscopy    PRE-PROCEDURE DIAGNOSIS: gross hematuria. Suspicion for ureterocele  POST-PROCEDURE DIAGNOSIS: Bilateral ureteroceles.  PROCEDURE: cystoscopy    HISTORY: Aga Alanis is a 44 year old female.  She has hx of hysterectomy, gross hematuria  Chronic back pain - more right than left but it can alternate.  CT showing punctate right nonobstructive stone, mild bilateral hydro  VCUG with no reflux but +ureteroceles.  She has numerous recurrent UTIs.    REVIEW OF OFFICE STUDIES:        DESCRIPTION OF PROCEDURE:  After informed consent was obtained, the patient was brought to the procedure room where he was placed in the supine position with all pressure points well padded.  He was prepped and draped in a sterile fashion. A flexible cystoscope was introduced through a well-lubricated urethra.   The bladder was clear of tumors  Bilateral ureteral orifices demonstrated ureteroceles.  A picture was taken for EPIC  There were no other lesions.    ASSESSMENT AND PLAN:  Bilateral ureteroceles confirmed on cystoscopy today  Discussed treatment  Discussed cysto/incision of bilateral ureteroceles. Discussed risks of bleeding, reflux, obstruction.  Plan for followup renal US 4-6 weeks post procedure.  Wants to proceed.  Aguilar Stoddard MD

## 2024-03-06 NOTE — LETTER
3/6/2024       RE: Aga Alanis  [address] [place] [address]     Dear Colleague,    Thank you for referring your patient, Aga Alanis, to the [facility] [place] at [facility]. Please see a copy of my visit note below.    Pre Op Teaching Flowsheet       Pre and Post op Patient Education  Surgical procedure:  Cystoscopy with Bilateral Ureterocele Incision   Teaching Topic:  Pre and post op teaching  Person Involved in teaching: Yes    Motivation Level:  Asks Questions: Yes  Eager to Learn: Yes  Cooperative: Yes  Receptive (willing/able to accept information):  Yes    Patient demonstrates understanding of the following:  Date of surgery:  TBD  Location of surgery:  [facility] - 5th Floor  History and Physical and any other testing necessary prior to surgery: Yes  Required time line for completion of History and Physical and any pre-op testing: Yes    Patient demonstrates understanding of the following:  Pre-op bowel prep:  N/A  Pre-op showering/scrub information with PCMX Soap: Yes  Blood thinner medications discussed and when to stop (if applicable):  Yes  Discussed no visitor's at this time due to increase Covid-19 cases and how we need to make sure everyone stays safe.    Infection Prevention:   Patient demonstrates understanding of the following:  Surgical procedure site care taught: Yes  Signs and symptoms of infection taught: Yes      Post-op follow-up:  Discussed how to contact the hospital, nurse, and clinic scheduling staff if necessary. (See packet information)    Instructional materials used/given/mailed:  [facility] Packet, post op teaching sheet, Map, Soap, and with the arrival/location information to come closer to the surgery date.    Surgical instructions packet given to patient in office:  N/A    Follow up: Discussed arranging for someone to drive  you home. ( No public transportation)  Someone needed to stay the first twenty hours after surgery: Yes     referral: No      home:  Yes     Care Giver:  Yes     PCP: Juli Chaudhry RN, BSN   Urology Care Coordinator    9:46 AM       Cystoscopy    PRE-PROCEDURE DIAGNOSIS: gross hematuria. Suspicion for ureterocele  POST-PROCEDURE DIAGNOSIS: Bilateral ureteroceles.  PROCEDURE: cystoscopy    HISTORY: Aga Alanis is a 44 year old female.  She has hx of hysterectomy, gross hematuria  Chronic back pain - more right than left but it can alternate.  CT showing punctate right nonobstructive stone, mild bilateral hydro  VCUG with no reflux but +ureteroceles.  She has numerous recurrent UTIs.    REVIEW OF OFFICE STUDIES:        DESCRIPTION OF PROCEDURE:  After informed consent was obtained, the patient was brought to the procedure room where he was placed in the supine position with all pressure points well padded.  He was prepped and draped in a sterile fashion. A flexible cystoscope was introduced through a well-lubricated urethra.   The bladder was clear of tumors  Bilateral ureteral orifices demonstrated ureteroceles.  A picture was taken for EPIC  There were no other lesions.    ASSESSMENT AND PLAN:  Bilateral ureteroceles confirmed on cystoscopy today  Discussed treatment  Discussed cysto/incision of bilateral ureteroceles. Discussed risks of bleeding, reflux, obstruction.  Plan for followup renal US 4-6 weeks post procedure.  Wants to proceed.  Aguilar Stoddard MD

## 2024-03-06 NOTE — PROGRESS NOTES
Pre Op Teaching Flowsheet       Pre and Post op Patient Education  Surgical procedure:  Cystoscopy with Bilateral Ureterocele Incision   Teaching Topic:  Pre and post op teaching  Person Involved in teaching: Yes    Motivation Level:  Asks Questions: Yes  Eager to Learn: Yes  Cooperative: Yes  Receptive (willing/able to accept information):  Yes    Patient demonstrates understanding of the following:  Date of surgery:  TBD  Location of surgery:  [facility] - 5th Floor  History and Physical and any other testing necessary prior to surgery: Yes  Required time line for completion of History and Physical and any pre-op testing: Yes    Patient demonstrates understanding of the following:  Pre-op bowel prep:  N/A  Pre-op showering/scrub information with PCMX Soap: Yes  Blood thinner medications discussed and when to stop (if applicable):  Yes  Discussed no visitor's at this time due to increase Covid-19 cases and how we need to make sure everyone stays safe.    Infection Prevention:   Patient demonstrates understanding of the following:  Surgical procedure site care taught: Yes  Signs and symptoms of infection taught: Yes      Post-op follow-up:  Discussed how to contact the hospital, nurse, and clinic scheduling staff if necessary. (See packet information)    Instructional materials used/given/mailed:  [facility] Packet, post op teaching sheet, Map, Soap, and with the arrival/location information to come closer to the surgery date.    Surgical instructions packet given to patient in office:  N/A    Follow up: Discussed arranging for someone to drive you home. ( No public transportation)  Someone needed to stay the first twenty hours after surgery: Yes     referral: No      home:  Yes     Care Giver:  Yes     PCP: Juli Chaudhry RN, BSN   Urology Care Coordinator    9:46 AM

## 2024-03-11 ENCOUNTER — TELEPHONE (OUTPATIENT)
Dept: UROLOGY | Facility: CLINIC | Age: 44
End: 2024-03-11
Payer: MEDICAID

## 2024-03-11 NOTE — TELEPHONE ENCOUNTER
Left voicemail for patient regarding scheduling surgery with Dr. Stoddard.  Provided contact number to discuss.  [phone]    Mary Jane Garcia, on 3/11/2024 at 12:17 PM

## 2024-03-14 PROBLEM — Q62.31 URETEROCELE, CONGENITAL: Status: ACTIVE | Noted: 2024-03-06

## 2024-03-14 NOTE — TELEPHONE ENCOUNTER
Patient called back asking about recovery time. Pt is hosting a baby shower the following weekend. Writer let pt know that they would send a message to Brooks Payne and asked that they call pt back to discuss. Staff message sent to Brooks Payne.   Mary Jane Garcia on 3/14/2024 at 9:01 AM

## 2024-03-14 NOTE — TELEPHONE ENCOUNTER
Pt stated that they never did get voicemail from writer. Writer told pt the phone number that was listed as primary number and that writer called, pt stated that they had never heard of that number.     Spoke with the patient and was able to confirm all scheduled information.     Patient is schedule for surgery with: Dr. Chamorro    Surgery Date: 4/26     [facility]    H&P: to be completed by Primary Care team - patient instructed to schedule per patient, this will be scheduled with [facility]     Post-op: Needs a renal ultrasound and virtual in 6 weeks. Please assist with scheduling    Patient will receive a phone call from pre-admission nurses 1-2 days prior to surgery with arrival time and NPO instructions.    Patient aware times are subject to change up until day before surgery.     Patient questions/concerns: N/A     Surgery packet was sent via [employer] 3/17      Mary Jane Garcia on 3/14/2024 at 8:46 AM

## 2024-03-22 NOTE — TELEPHONE ENCOUNTER
Pt called to have procedure moved to another date. New DOS 5/24    Spoke with the patient and was able to confirm all scheduled information.     Patient is schedule for surgery with: Dr. Stoddard    Surgery Date: 5/24     [facility]    H&P: to be completed by Primary Care team - patient instructed to schedule per patient, this will be scheduled with [facility]     Post-op: Needs a renal ultrasound and virtual in 6 weeks. Please assist with scheduling     Patient will receive a phone call from pre-admission nurses 1-2 days prior to surgery with arrival time and NPO instructions.    Patient aware times are subject to change up until day before surgery.     Patient questions/concerns: N/A     Surgery packet sent previously       Mary Jane Garcia on 3/22/2024 at 12:12 PM

## 2024-04-29 ENCOUNTER — PRE VISIT (OUTPATIENT)
Dept: UROLOGY | Facility: CLINIC | Age: 44
End: 2024-04-29
Payer: MEDICAID

## 2024-05-03 DIAGNOSIS — R31.0 GROSS HEMATURIA: Primary | ICD-10-CM

## 2024-05-21 ENCOUNTER — LAB (OUTPATIENT)
Dept: LAB | Facility: CLINIC | Age: 44
End: 2024-05-21
Payer: COMMERCIAL

## 2024-05-21 DIAGNOSIS — R31.0 GROSS HEMATURIA: ICD-10-CM

## 2024-05-21 LAB
ALBUMIN UR-MCNC: NEGATIVE MG/DL
APPEARANCE UR: CLEAR
BILIRUB UR QL STRIP: NEGATIVE
COLOR UR AUTO: YELLOW
GLUCOSE UR STRIP-MCNC: NEGATIVE MG/DL
HGB UR QL STRIP: NEGATIVE
KETONES UR STRIP-MCNC: NEGATIVE MG/DL
LEUKOCYTE ESTERASE UR QL STRIP: NEGATIVE
NITRATE UR QL: NEGATIVE
PH UR STRIP: 8.5 [PH] (ref 5–7)
RBC #/AREA URNS AUTO: ABNORMAL /HPF
SP GR UR STRIP: 1.02 (ref 1–1.03)
SQUAMOUS #/AREA URNS AUTO: ABNORMAL /LPF
UROBILINOGEN UR STRIP-ACNC: 0.2 E.U./DL
WBC #/AREA URNS AUTO: ABNORMAL /HPF

## 2024-05-21 PROCEDURE — 81001 URINALYSIS AUTO W/SCOPE: CPT

## 2024-05-21 PROCEDURE — 87086 URINE CULTURE/COLONY COUNT: CPT

## 2024-05-22 ENCOUNTER — MYC MEDICAL ADVICE (OUTPATIENT)
Dept: SURGERY | Facility: AMBULATORY SURGERY CENTER | Age: 44
End: 2024-05-22

## 2024-05-22 LAB — BACTERIA UR CULT: NORMAL

## 2024-05-23 ENCOUNTER — ANESTHESIA EVENT (OUTPATIENT)
Dept: SURGERY | Facility: AMBULATORY SURGERY CENTER | Age: 44
End: 2024-05-23
Payer: COMMERCIAL

## 2024-05-24 ENCOUNTER — HOSPITAL ENCOUNTER (OUTPATIENT)
Facility: AMBULATORY SURGERY CENTER | Age: 44
Discharge: HOME | End: 2024-05-24
Attending: UROLOGY
Payer: COMMERCIAL

## 2024-05-24 ENCOUNTER — ANESTHESIA (OUTPATIENT)
Dept: SURGERY | Facility: AMBULATORY SURGERY CENTER | Age: 44
End: 2024-05-24
Payer: COMMERCIAL

## 2024-05-24 VITALS
DIASTOLIC BLOOD PRESSURE: 63 MMHG | SYSTOLIC BLOOD PRESSURE: 92 MMHG | TEMPERATURE: 97.5 F | HEART RATE: 78 BPM | BODY MASS INDEX: 21.85 KG/M2 | OXYGEN SATURATION: 98 % | HEIGHT: 64 IN | WEIGHT: 128 LBS | RESPIRATION RATE: 16 BRPM

## 2024-05-24 DIAGNOSIS — Q62.31 URETEROCELE, CONGENITAL: Primary | ICD-10-CM

## 2024-05-24 PROCEDURE — 52300 CYSTOSCOPY AND TREATMENT: CPT

## 2024-05-24 PROCEDURE — 52300 CYSTOSCOPY AND TREATMENT: CPT | Performed by: ANESTHESIOLOGY

## 2024-05-24 PROCEDURE — 52300 CYSTOSCOPY AND TREATMENT: CPT | Performed by: NURSE ANESTHETIST, CERTIFIED REGISTERED

## 2024-05-24 PROCEDURE — 52300 CYSTOSCOPY AND TREATMENT: CPT | Mod: GC | Performed by: UROLOGY

## 2024-05-24 RX ORDER — FENTANYL CITRATE 50 UG/ML
25 INJECTION, SOLUTION INTRAMUSCULAR; INTRAVENOUS
Status: DISCONTINUED | OUTPATIENT
Start: 2024-05-24 | End: 2024-05-25 | Stop reason: HOSPADM

## 2024-05-24 RX ORDER — PROPOFOL 10 MG/ML
INJECTION, EMULSION INTRAVENOUS PRN
Status: DISCONTINUED | OUTPATIENT
Start: 2024-05-24 | End: 2024-05-24

## 2024-05-24 RX ORDER — NALOXONE HYDROCHLORIDE 0.4 MG/ML
0.1 INJECTION, SOLUTION INTRAMUSCULAR; INTRAVENOUS; SUBCUTANEOUS
Status: DISCONTINUED | OUTPATIENT
Start: 2024-05-24 | End: 2024-05-24 | Stop reason: HOSPADM

## 2024-05-24 RX ORDER — DEXAMETHASONE SODIUM PHOSPHATE 10 MG/ML
4 INJECTION, SOLUTION INTRAMUSCULAR; INTRAVENOUS
Status: DISCONTINUED | OUTPATIENT
Start: 2024-05-24 | End: 2024-05-25 | Stop reason: HOSPADM

## 2024-05-24 RX ORDER — FENTANYL CITRATE 50 UG/ML
INJECTION, SOLUTION INTRAMUSCULAR; INTRAVENOUS PRN
Status: DISCONTINUED | OUTPATIENT
Start: 2024-05-24 | End: 2024-05-24

## 2024-05-24 RX ORDER — OXYCODONE HYDROCHLORIDE 5 MG/1
10 TABLET ORAL
Status: DISCONTINUED | OUTPATIENT
Start: 2024-05-24 | End: 2024-05-25 | Stop reason: HOSPADM

## 2024-05-24 RX ORDER — CLINDAMYCIN HCL 300 MG
CAPSULE ORAL
COMMUNITY
Start: 2023-09-13 | End: 2024-05-31

## 2024-05-24 RX ORDER — FENTANYL CITRATE 50 UG/ML
50 INJECTION, SOLUTION INTRAMUSCULAR; INTRAVENOUS EVERY 5 MIN PRN
Status: DISCONTINUED | OUTPATIENT
Start: 2024-05-24 | End: 2024-05-24 | Stop reason: HOSPADM

## 2024-05-24 RX ORDER — CEPHALEXIN 500 MG/1
CAPSULE ORAL
COMMUNITY
Start: 2024-01-22 | End: 2024-05-31

## 2024-05-24 RX ORDER — CEFAZOLIN SODIUM 2 G/50ML
2 SOLUTION INTRAVENOUS SEE ADMIN INSTRUCTIONS
Status: DISCONTINUED | OUTPATIENT
Start: 2024-05-24 | End: 2024-05-24 | Stop reason: HOSPADM

## 2024-05-24 RX ORDER — LIDOCAINE HYDROCHLORIDE 20 MG/ML
INJECTION, SOLUTION INFILTRATION; PERINEURAL PRN
Status: DISCONTINUED | OUTPATIENT
Start: 2024-05-24 | End: 2024-05-24

## 2024-05-24 RX ORDER — ACETAMINOPHEN 325 MG/1
975 TABLET ORAL ONCE
Status: COMPLETED | OUTPATIENT
Start: 2024-05-24 | End: 2024-05-24

## 2024-05-24 RX ORDER — GLYCOPYRROLATE 0.2 MG/ML
INJECTION, SOLUTION INTRAMUSCULAR; INTRAVENOUS PRN
Status: DISCONTINUED | OUTPATIENT
Start: 2024-05-24 | End: 2024-05-24

## 2024-05-24 RX ORDER — LIDOCAINE 40 MG/G
CREAM TOPICAL
Status: DISCONTINUED | OUTPATIENT
Start: 2024-05-24 | End: 2024-05-24 | Stop reason: HOSPADM

## 2024-05-24 RX ORDER — NALOXONE HYDROCHLORIDE 0.4 MG/ML
0.1 INJECTION, SOLUTION INTRAMUSCULAR; INTRAVENOUS; SUBCUTANEOUS
Status: DISCONTINUED | OUTPATIENT
Start: 2024-05-24 | End: 2024-05-25 | Stop reason: HOSPADM

## 2024-05-24 RX ORDER — PROPOFOL 10 MG/ML
INJECTION, EMULSION INTRAVENOUS CONTINUOUS PRN
Status: DISCONTINUED | OUTPATIENT
Start: 2024-05-24 | End: 2024-05-24

## 2024-05-24 RX ORDER — HYDROMORPHONE HYDROCHLORIDE 1 MG/ML
0.4 INJECTION, SOLUTION INTRAMUSCULAR; INTRAVENOUS; SUBCUTANEOUS EVERY 5 MIN PRN
Status: DISCONTINUED | OUTPATIENT
Start: 2024-05-24 | End: 2024-05-24 | Stop reason: HOSPADM

## 2024-05-24 RX ORDER — ONDANSETRON 2 MG/ML
INJECTION INTRAMUSCULAR; INTRAVENOUS PRN
Status: DISCONTINUED | OUTPATIENT
Start: 2024-05-24 | End: 2024-05-24

## 2024-05-24 RX ORDER — DEXAMETHASONE SODIUM PHOSPHATE 10 MG/ML
4 INJECTION, SOLUTION INTRAMUSCULAR; INTRAVENOUS
Status: DISCONTINUED | OUTPATIENT
Start: 2024-05-24 | End: 2024-05-24 | Stop reason: HOSPADM

## 2024-05-24 RX ORDER — ONDANSETRON 4 MG/1
4 TABLET, ORALLY DISINTEGRATING ORAL EVERY 30 MIN PRN
Status: DISCONTINUED | OUTPATIENT
Start: 2024-05-24 | End: 2024-05-24 | Stop reason: HOSPADM

## 2024-05-24 RX ORDER — PHENAZOPYRIDINE HYDROCHLORIDE 100 MG/1
100 TABLET, FILM COATED ORAL 3 TIMES DAILY PRN
Qty: 9 TABLET | Refills: 0 | Status: SHIPPED | OUTPATIENT
Start: 2024-05-24 | End: 2024-05-31

## 2024-05-24 RX ORDER — PHENTERMINE HYDROCHLORIDE 30 MG/1
37.5 CAPSULE ORAL EVERY MORNING
COMMUNITY

## 2024-05-24 RX ORDER — OXYCODONE HYDROCHLORIDE 5 MG/1
5 TABLET ORAL
Status: DISCONTINUED | OUTPATIENT
Start: 2024-05-24 | End: 2024-05-25 | Stop reason: HOSPADM

## 2024-05-24 RX ORDER — ONDANSETRON 4 MG/1
4 TABLET, ORALLY DISINTEGRATING ORAL EVERY 30 MIN PRN
Status: DISCONTINUED | OUTPATIENT
Start: 2024-05-24 | End: 2024-05-25 | Stop reason: HOSPADM

## 2024-05-24 RX ORDER — LAMOTRIGINE 100 MG/1
TABLET ORAL
COMMUNITY

## 2024-05-24 RX ORDER — ONDANSETRON 2 MG/ML
4 INJECTION INTRAMUSCULAR; INTRAVENOUS EVERY 30 MIN PRN
Status: DISCONTINUED | OUTPATIENT
Start: 2024-05-24 | End: 2024-05-25 | Stop reason: HOSPADM

## 2024-05-24 RX ORDER — MEPERIDINE HYDROCHLORIDE 25 MG/ML
12.5 INJECTION INTRAMUSCULAR; INTRAVENOUS; SUBCUTANEOUS EVERY 5 MIN PRN
Status: DISCONTINUED | OUTPATIENT
Start: 2024-05-24 | End: 2024-05-24 | Stop reason: HOSPADM

## 2024-05-24 RX ORDER — ONDANSETRON 2 MG/ML
4 INJECTION INTRAMUSCULAR; INTRAVENOUS EVERY 30 MIN PRN
Status: DISCONTINUED | OUTPATIENT
Start: 2024-05-24 | End: 2024-05-24 | Stop reason: HOSPADM

## 2024-05-24 RX ORDER — SODIUM CHLORIDE, SODIUM LACTATE, POTASSIUM CHLORIDE, CALCIUM CHLORIDE 600; 310; 30; 20 MG/100ML; MG/100ML; MG/100ML; MG/100ML
INJECTION, SOLUTION INTRAVENOUS CONTINUOUS PRN
Status: DISCONTINUED | OUTPATIENT
Start: 2024-05-24 | End: 2024-05-24

## 2024-05-24 RX ORDER — DEXAMETHASONE SODIUM PHOSPHATE 4 MG/ML
INJECTION, SOLUTION INTRA-ARTICULAR; INTRALESIONAL; INTRAMUSCULAR; INTRAVENOUS; SOFT TISSUE PRN
Status: DISCONTINUED | OUTPATIENT
Start: 2024-05-24 | End: 2024-05-24

## 2024-05-24 RX ORDER — CEFAZOLIN SODIUM 2 G/50ML
2 SOLUTION INTRAVENOUS
Status: COMPLETED | OUTPATIENT
Start: 2024-05-24 | End: 2024-05-24

## 2024-05-24 RX ORDER — FENTANYL CITRATE 50 UG/ML
25 INJECTION, SOLUTION INTRAMUSCULAR; INTRAVENOUS EVERY 5 MIN PRN
Status: DISCONTINUED | OUTPATIENT
Start: 2024-05-24 | End: 2024-05-24 | Stop reason: HOSPADM

## 2024-05-24 RX ORDER — METRONIDAZOLE 7.5 MG/G
GEL VAGINAL
COMMUNITY
Start: 2023-08-22 | End: 2024-05-31

## 2024-05-24 RX ORDER — HYDROMORPHONE HYDROCHLORIDE 1 MG/ML
0.2 INJECTION, SOLUTION INTRAMUSCULAR; INTRAVENOUS; SUBCUTANEOUS EVERY 5 MIN PRN
Status: DISCONTINUED | OUTPATIENT
Start: 2024-05-24 | End: 2024-05-24 | Stop reason: HOSPADM

## 2024-05-24 RX ORDER — SODIUM CHLORIDE, SODIUM LACTATE, POTASSIUM CHLORIDE, CALCIUM CHLORIDE 600; 310; 30; 20 MG/100ML; MG/100ML; MG/100ML; MG/100ML
INJECTION, SOLUTION INTRAVENOUS CONTINUOUS
Status: DISCONTINUED | OUTPATIENT
Start: 2024-05-24 | End: 2024-05-24 | Stop reason: HOSPADM

## 2024-05-24 RX ORDER — LABETALOL HYDROCHLORIDE 5 MG/ML
10 INJECTION, SOLUTION INTRAVENOUS
Status: DISCONTINUED | OUTPATIENT
Start: 2024-05-24 | End: 2024-05-24 | Stop reason: HOSPADM

## 2024-05-24 RX ADMIN — ACETAMINOPHEN 975 MG: 325 TABLET ORAL at 11:33

## 2024-05-24 RX ADMIN — PROPOFOL 150 MCG/KG/MIN: 10 INJECTION, EMULSION INTRAVENOUS at 12:24

## 2024-05-24 RX ADMIN — FENTANYL CITRATE 100 MCG: 50 INJECTION, SOLUTION INTRAMUSCULAR; INTRAVENOUS at 12:24

## 2024-05-24 RX ADMIN — Medication 50 MG: at 12:25

## 2024-05-24 RX ADMIN — LIDOCAINE HYDROCHLORIDE 100 MG: 20 INJECTION, SOLUTION INFILTRATION; PERINEURAL at 12:24

## 2024-05-24 RX ADMIN — SODIUM CHLORIDE, SODIUM LACTATE, POTASSIUM CHLORIDE, CALCIUM CHLORIDE: 600; 310; 30; 20 INJECTION, SOLUTION INTRAVENOUS at 12:14

## 2024-05-24 RX ADMIN — Medication 100 MCG: at 12:48

## 2024-05-24 RX ADMIN — CEFAZOLIN SODIUM 2 G: 2 SOLUTION INTRAVENOUS at 12:28

## 2024-05-24 RX ADMIN — Medication 100 MCG: at 12:38

## 2024-05-24 RX ADMIN — PROPOFOL 200 MG: 10 INJECTION, EMULSION INTRAVENOUS at 12:24

## 2024-05-24 RX ADMIN — DEXAMETHASONE SODIUM PHOSPHATE 4 MG: 4 INJECTION, SOLUTION INTRA-ARTICULAR; INTRALESIONAL; INTRAMUSCULAR; INTRAVENOUS; SOFT TISSUE at 12:25

## 2024-05-24 RX ADMIN — ONDANSETRON 4 MG: 2 INJECTION INTRAMUSCULAR; INTRAVENOUS at 12:25

## 2024-05-24 RX ADMIN — GLYCOPYRROLATE 0.4 MG: 0.2 INJECTION, SOLUTION INTRAMUSCULAR; INTRAVENOUS at 13:15

## 2024-05-24 RX ADMIN — SODIUM CHLORIDE, SODIUM LACTATE, POTASSIUM CHLORIDE, CALCIUM CHLORIDE: 600; 310; 30; 20 INJECTION, SOLUTION INTRAVENOUS at 11:36

## 2024-05-24 NOTE — BRIEF OP NOTE
[facility]    Brief Operative Note    Pre-operative diagnosis: Ureterocele, congenital [Q62.31]  Post-operative diagnosis Same as pre-operative diagnosis    Procedure: Cystoscopy with Bilateral Ureterocele Incision, Bilateral - Urethra    Surgeon: Surgeons and Role:     * Aguilar Stoddard MD - Primary     * Eliseo Avila MD - Resident - Assisting  Anesthesia: General   Estimated Blood Loss: Minimal    Drains: None  Specimens: * No specimens in log *  Findings:   None.  Complications: None.  Implants: * No implants in log *        Discharge    Cb Rabago MD  Urology PGY-4

## 2024-05-24 NOTE — ANESTHESIA POSTPROCEDURE EVALUATION
Patient: Aga Alanis    Procedure: Procedure(s):  Cystoscopy with Bilateral Ureterocele Incision       Anesthesia Type:  General    Note:  Disposition: Outpatient   Postop Pain Control: Uneventful            Sign Out: Well controlled pain   PONV: No   Neuro/Psych: Uneventful            Sign Out: Acceptable/Baseline neuro status   Airway/Respiratory: Uneventful            Sign Out: Acceptable/Baseline resp. status   CV/Hemodynamics: Uneventful            Sign Out: Acceptable CV status; No obvious hypovolemia; No obvious fluid overload   Other NRE: NONE   DID A NON-ROUTINE EVENT OCCUR? No       Last vitals:  Vitals Value Taken Time   /66 05/24/24 1341   Temp 36.5  C (97.7  F) 05/24/24 1341   Pulse 92 05/24/24 1341   Resp 16 05/24/24 1341   SpO2 100 % 05/24/24 1341       Electronically Signed By: Sharda Rogers MD  May 24, 2024  1:50 PM  
Detail Level: Detailed

## 2024-05-24 NOTE — ANESTHESIA CARE TRANSFER NOTE
Patient: Aga Alanis    Procedure: Procedure(s):  Cystoscopy with Bilateral Ureterocele Incision       Diagnosis: Ureterocele, congenital [Q62.31]  Diagnosis Additional Information: No value filed.    Anesthesia Type:   General     Note:    Oropharynx: oropharynx clear of all foreign objects and spontaneously breathing  Level of Consciousness: awake  Oxygen Supplementation: face mask  Level of Supplemental Oxygen (L/min / FiO2): 6  Independent Airway: airway patency satisfactory and stable  Dentition: dentition unchanged  Vital Signs Stable: post-procedure vital signs reviewed and stable  Report to RN Given: handoff report given  Patient transferred to: PACU    Handoff Report: Identifed the Patient, Identified the Reponsible Provider, Reviewed the pertinent medical history, Discussed the surgical course, Reviewed Intra-OP anesthesia mangement and issues during anesthesia, Set expectations for post-procedure period and Allowed opportunity for questions and acknowledgement of understanding  Vitals:  Vitals Value Taken Time   /61 05/24/24 1330   Temp 36.2  C (97.2  F) 05/24/24 1328   Pulse 95 05/24/24 1331   Resp 14 05/24/24 1331   SpO2 100 % 05/24/24 1331   Vitals shown include unfiled device data.    Electronically Signed By: MAITE Edwards CRNA  May 24, 2024  1:32 PM

## 2024-05-24 NOTE — ANESTHESIA PROCEDURE NOTES
Airway       Patient location during procedure: OR       Procedure Start/Stop Times: 5/24/2024 12:28 PM  Staff -        CRNA: Rolanda Prajapati APRN CRNA       Performed By: resident  Consent for Airway        Urgency: elective  Indications and Patient Condition       Indications for airway management: angi-procedural and airway protection       Induction type:intravenous       Mask difficulty assessment: 1 - vent by mask    Final Airway Details       Final airway type: endotracheal airway       Successful airway: ETT - single  Endotracheal Airway Details        Successful intubation technique: video laryngoscopy       VL Blade Size: Glidescope 3       Grade View of Cords: 1       Adjucts: stylet       Position: Right       Measured from: gums/teeth       Secured at (cm): 21       Bite block used: None    Post intubation assessment        Placement verified by: capnometry, equal breath sounds and chest rise        Number of attempts at approach: 2 (grade III view with MAC 3, grade I view with glidescope 3)       Secured with: tape       Ease of procedure: easy       Dentition: Intact    Medication(s) Administered   Medication Administration Time: 5/24/2024 12:28 PM

## 2024-05-24 NOTE — DISCHARGE INSTRUCTIONS
Ureterocele incision:    Activity  - There are no activity restrictions    Diet:  You may resume your regular diet.     Common symptoms related to the surgery:  - You will likely notice some blood in the urine for a few days to a week while things heal. You may also notice more blood in the urine after physical activity. Normal urine color can range from yellow to dark red. This is not problematic as long as you are able to empty your bladder. Although urine may seem quite bloody, a few drops of blood can color the entire toilet bowl red- much like food coloring. Increase your fluid intake to help flush the blood out of your bladder.   - You may also notice a twinge of pain in your kidney during urination. This can be severe, but should get better after the first few days. This is due to urine traveling backward (up into your kidney) when the bladder squeezes. It is normal and not a cause for concern.  - You may feel like you need to urinate more frequently than usual. Some patients experience a lower abdominal cramping or  spasm . You may notice urine leakage from your urethra after this happens.  - You may experience some burning with urination due to minor trauma from the scope used during your surgery. This should disappear over the next few days.     Medications:  Anti-inflammatories/NSAIDs (Ibuprofen, Advil, Aleve) are the most effective pain relievers for discomfort. You may safely use these in combination with Tylenol.   You may also take Tylenol for pain control- but not more than 3000 mg daily.  You may take pyridium for urinary burning    Follow-Up:  - Follow up with Dr. Stoddard   - Call or return sooner than your regularly scheduled visit if you develop any of the following: fever (greater than 101.5), uncontrolled pain, uncontrolled nausea or vomiting, or inability to urinate.    Phone numbers:   - Monday through Friday 8am to 4:30pm: Call [phone] with questions, requests for medication refills, or  "to schedule or confirm an appointment.  - Nights or weekends: call the after hours emergency pager - [phone] and tell the  \"I would like to page the Urology Resident on call.\" Please note, due to prescribing laws, resident physicians are unable to prescribe narcotics after-hours. If you feel as though you will need a refill of a narcotic pain medication, you will need to call the clinic during business hours OR seek emergency care.  - For emergencies, call 911    [facility]  Home Care Following Anesthesia  For 24 hours after surgery:  Get plenty of rest.  A responsible adult must stay with you for at least 24 hours after you leave the surgery center.  Do not drive or use heavy equipment.  If you have weakness or tingling, don't drive or use heavy equipment until this feeling goes away.   Do not drink alcohol.   Avoid strenuous or risky activities.  Ask for help when climbing stairs.  You may feel lightheaded.  IF so, sit for a few minutes before standing.  Have someone help you get up.   If you have nausea (feel sick to your stomach): Drink only clear liquids such as apple juice, ginger ale, broth or 7-Up.  Rest may also help.  Be sure to drink enough fluids.  Move to a regular diet as you feel able.   You may have a slight fever.  Call the doctor if your fever is over 100 F (37.7 C) (taken under the tongue) or lasts longer than 24 hours.  You may have a dry mouth, a sore throat, muscle aches or trouble sleeping. These should go away after 24 hours.  Do not make important or legal decisions.   It is recommended to avoid smoking.   If you use hormonal birth control (such as the pill, patch, ring or implants):  You will need a second form of birth control for 7 days (condoms, a diaphragm or contraceptive foam).  While in the surgery center, you received a medicine called Sugammadex.  Hormonal birth control (such as the pill, patch, ring or implants) will not work as " well for a week after taking this medicine.         Tips for taking pain medications  To get the best pain relief possible, remember these points:  Take pain medications as directed, before pain becomes severe.  Pain medication can upset your stomach: taking it with food may help.  Constipation is a common side effect of pain medication. Drink plenty of  fluids.  Eat foods high in fiber. Take a stool softener if recommended by your doctor or pharmacist.  Do not drink alcohol, drive or operate machinery while taking pain medications.  Ask about other ways to control pain, such as with heat, ice or relaxation.    Tylenol/Acetaminophen Consumption    If you feel your pain relief is insufficient, you may take Tylenol/Acetaminophen in addition to your narcotic pain medication.   Be careful not to exceed 4,000 mg of Tylenol/Acetaminophen in a 24 hour period from all sources.  If you are taking extra strength Tylenol/acetaminophen (500 mg), the maximum dose is 8 tablets in 24 hours.  If you are taking regular strength acetaminophen (325 mg), the maximum dose is 12 tablets in 24 hours.    Call a doctor for any of the following:  Signs of infection (fever, growing tenderness at the surgery site, a large amount of drainage or bleeding, severe pain, foul-smelling drainage, redness, swelling).  It has been over 8 to 10 hours since surgery and you are still not able to urinate (pass water).  Headache for over 24 hours.  Numbness, tingling or weakness the day after surgery (if you had spinal anesthesia).  Signs of Covid-19 infection (temperature over 100 degrees, shortness of breath, cough, loss of taste/smell, generalized body aches, persistent headache, chills, sore throat, nausea/vomiting/diarrhea)  Your doctor is:       Dr. Aguilar Stoddard, Prostate and Urology: [phone]               Or dial [phone] and ask for the resident on call for:  Prostate Urology  For emergency care, call the:  [facility]:   [phone] (TTY for hearing impaired: [phone])

## 2024-05-24 NOTE — ANESTHESIA PREPROCEDURE EVALUATION
Anesthesia Pre-Procedure Evaluation    Patient: Aga Alanis   MRN: [id number] : 1980        Procedure : Procedure(s):  Cystoscopy with Bilateral Ureterocele Incision          Past Medical History:   Diagnosis Date     Depression      DVT (deep venous thrombosis) (H)      Gastroesophageal reflux disease with esophagitis      NNEKA (obstructive sleep apnea)     used CPAP at higher BMI; has stopped using since BMI reduced     Ovarian cyst       Past Surgical History:   Procedure Laterality Date     COSMETIC SURGERY      breast augmentation and tummy tuck      ENT SURGERY      tubes      EXAM UNDER ANESTHESIA PELVIC N/A 2018    Procedure: Vaginal exam under anesthesia, repair of vaginal cuff;  Surgeon: Bev Fischer MD;  Location: [facility] OR     GYN SURGERY      ovarian cyst , tubes tied      HYSTERECTOMY       LAPAROSCOPIC GASTRIC SLEEVE N/A 2021    Procedure: GASTRECTOMY, SLEEVE, LAPAROSCOPIC;  Surgeon: Davi Dee MD;  [facility]     LAPAROSCOPIC HERNIORRHAPHY HIATAL N/A 2021    Procedure: HERNIORRHAPHY, HIATAL, LAPAROSCOPIC;  Surgeon: Davi Dee MD;  Location: [facility]     ORTHOPEDIC SURGERY      knee right      REPAIR LACERATION VAGINA N/A 2018    Procedure: Repair of vaginal laceration;  Surgeon: Bev Fischer MD;  Location: [facility] OR      Allergies   Allergen Reactions     Sulfa Antibiotics Anaphylaxis     Antibiotic, but unsure which one     Codeine Nausea and Vomiting     Codeine Sulfate Nausea and Vomiting     Demerol Nausea and Vomiting     Wheat Other (See Comments)     Food allergy tested and confirmed by [facility]     Wheat Gluten  [Gluten Meal] Diarrhea, GI Disturbance and Nausea     Metronidazole Rash      Social History     Tobacco Use     Smoking status: Never     Smokeless tobacco: Never   Substance Use Topics     Alcohol use: Yes      Wt Readings from Last 1 Encounters:   24 57.2 kg (126 lb)        Anesthesia Evaluation   Pt has had prior  "anesthetic. Type: General.    History of anesthetic complications  - PONV.      ROS/MED HX  ENT/Pulmonary:     (+) sleep apnea,                                       Neurologic: Comment: Vertigo      Cardiovascular:  - neg cardiovascular ROS     METS/Exercise Tolerance: >4 METS    Hematologic:     (+) History of blood clots,    pt is not anticoagulated,           Musculoskeletal:  - neg musculoskeletal ROS     GI/Hepatic:     (+) GERD, Symptomatic,                  Renal/Genitourinary: Comment: Ureterocele      Endo: Comment: Wt loss after gastric sleeve sx      Psychiatric/Substance Use:     (+) psychiatric history depression       Infectious Disease:  - neg infectious disease ROS     Malignancy:  - neg malignancy ROS     Other:  - neg other ROS          Physical Exam    Airway        Mallampati: I   TM distance: < 3 FB   Neck ROM: full   Mouth opening: > 3 cm    Respiratory Devices and Support         Dental       (+) Minor Abnormalities - some fillings, tiny chips      Cardiovascular   cardiovascular exam normal       Rhythm and rate: regular and normal     Pulmonary   pulmonary exam normal        breath sounds clear to auscultation       OUTSIDE LABS:  CBC:   Lab Results   Component Value Date    WBC 5.1 09/11/2021    WBC 5.0 07/26/2021    HGB 13.7 09/11/2021    HGB 14.0 07/26/2021    HCT 42.1 09/11/2021    HCT 42.5 07/26/2021     09/11/2021     07/26/2021     BMP:   Lab Results   Component Value Date     09/11/2021     07/26/2021    POTASSIUM 3.6 09/11/2021    POTASSIUM 4.4 07/26/2021    CHLORIDE 106 09/11/2021    CHLORIDE 108 07/26/2021    CO2 29 09/11/2021    CO2 30 07/26/2021    BUN 8 09/11/2021    BUN 6 (L) 07/26/2021    CR 0.7 01/25/2024    CR 0.81 09/11/2021    GLC 76 09/11/2021    GLC 82 07/26/2021     COAGS: No results found for: \"PTT\", \"INR\", \"FIBR\"  POC:   Lab Results   Component Value Date    BGM 90 04/26/2021    HCG Negative 01/16/2010    HCGS Negative 09/11/2021 "     HEPATIC:   Lab Results   Component Value Date    ALBUMIN 3.7 09/11/2021    PROTTOTAL 7.5 09/11/2021    ALT 33 09/11/2021    AST 16 09/11/2021    ALKPHOS 53 09/11/2021    BILITOTAL 0.5 09/11/2021     OTHER:   Lab Results   Component Value Date    A1C 5.1 07/26/2021    SUMEET 9.1 09/11/2021       Anesthesia Plan    ASA Status:  2    NPO Status:  NPO Appropriate    Anesthesia Type: General.     - Airway: ETT      Maintenance: TIVA.        Consents    Anesthesia Plan(s) and associated risks, benefits, and realistic alternatives discussed. Questions answered and patient/representative(s) expressed understanding.     - Discussed:     - Discussed with:  Patient            Postoperative Care    Pain management: Multi-modal analgesia.   PONV prophylaxis: Ondansetron (or other 5HT-3), Dexamethasone or Solumedrol, Background Propofol Infusion (Patient refuses scopolamine patch)     Comments:               Sharda Rogers MD    I have reviewed the pertinent notes and labs in the chart from the past 30 days and (re)examined the patient.  Any updates or changes from those notes are reflected in this note.

## 2024-05-25 NOTE — OP NOTE
PRE-OPERATIVE DIAGNOSIS:   1.  Bilateral ureterocele    POST-OPERATIVE DIAGNOSIS:  same    PROCEDURE:    1. Cystourethroscopy with transurethral resection of bilateral ureteroceles      SURGEON:  Aguilar Stoddard MD  RESIDENT:  Cb Rabago MD    ESTIMATED BLOOD LOSS:  none    DRAINS:  None    SIGNIFICANT FINDINGS: bilateral obstructive ureteroceles which were resected and freely flowing urine visualized from both ureteral orifices. Ureteral mucosa visible at end of case.    OPERATIVE INDICATIONS:  Aga Alanis is a 44 year old female with gross hematuria, recurrent UTI, chronic flank pain and bilateral hydronephrosis seen on CT.    Ureteroceles were identified.  We discussed management and she elected to proceed with bilateral ureterocele resection.    OPERATIVE DETAILS:  The patient was taken to the operating room in her usual state of health.   She was positioned in modified dorsal lithotomy with yellowfin stirrups.  Her genitalia were prepped and draped in the standard fashion. A time-out was performed to ensure proper patient, procedure and positioning.  The patient received appropriate IV antibiotics prior to the procedure.    Resectoscope was placed in the bladder. Ureteroceles were obvious and appeared obstructive.    We initially tried monopolar Farmer knife resection, but this was not very successful.    Thus we switched to saline irrigation and using the bipolar resectoscope.    We performed resection of bilateral ureteroceles.    There was clearly visible mucosa of the ureter visible. We obtained hemostasis in a few focal areas.    There was freely effluxing urine from both ureteral orifices at end of case. There was no bleeding at the end of the case.    We emptied the bladder and removed scope.    PLAN:  We'll plan a renal ultrasound in 4-6 weeks.    As attending surgeon, I, Aguilar Stoddard MD, was scrubbed and present for the entire procedure.

## 2024-05-28 ENCOUNTER — TELEPHONE (OUTPATIENT)
Dept: UROLOGY | Facility: CLINIC | Age: 44
End: 2024-05-28
Payer: COMMERCIAL

## 2024-05-28 DIAGNOSIS — N13.30 BILATERAL HYDRONEPHROSIS: Primary | ICD-10-CM

## 2024-05-28 NOTE — PROGRESS NOTES
Follow up plan with Dr. Stoddard.  PT will have MARK ANTHONY and VV in 4-6 weeks.     Kerry Ng RN   6:17 AM

## 2024-05-28 NOTE — TELEPHONE ENCOUNTER
Patient confirmed scheduled appointment:  Date: June 26th   Time: 2:45pm   Visit type: Return   Provider: Dr. Stoddard   [place] [facility]  Testing/imaging: MARK ANTHONY prior   Additional notes:  call patient and help her get scheduled for renal ultra sound and VV with Dr. Stoddard in 4-6 weeks - per message received. Warm transferred to an imaging coordinator to schedule MARK ANTHONY prior

## 2024-05-31 ENCOUNTER — HOSPITAL ENCOUNTER (EMERGENCY)
Facility: CLINIC | Age: 44
Discharge: HOME | End: 2024-05-31
Attending: EMERGENCY MEDICINE | Admitting: EMERGENCY MEDICINE
Payer: COMMERCIAL

## 2024-05-31 ENCOUNTER — TELEPHONE (OUTPATIENT)
Dept: NURSING | Facility: CLINIC | Age: 44
End: 2024-05-31

## 2024-05-31 VITALS
DIASTOLIC BLOOD PRESSURE: 66 MMHG | HEIGHT: 64 IN | HEART RATE: 72 BPM | BODY MASS INDEX: 24.01 KG/M2 | WEIGHT: 140.65 LBS | TEMPERATURE: 97.8 F | OXYGEN SATURATION: 98 % | RESPIRATION RATE: 18 BRPM | SYSTOLIC BLOOD PRESSURE: 93 MMHG

## 2024-05-31 DIAGNOSIS — R33.8 ACUTE RETENTION OF URINE: ICD-10-CM

## 2024-05-31 LAB
ALBUMIN UR-MCNC: 70 MG/DL
APPEARANCE UR: ABNORMAL
BILIRUB UR QL STRIP: NEGATIVE
COLOR UR AUTO: ABNORMAL
GLUCOSE UR STRIP-MCNC: NEGATIVE MG/DL
HGB UR QL STRIP: ABNORMAL
KETONES UR STRIP-MCNC: ABNORMAL MG/DL
LEUKOCYTE ESTERASE UR QL STRIP: ABNORMAL
NITRATE UR QL: NEGATIVE
PH UR STRIP: 6.5 [PH] (ref 5–7)
RBC URINE: >182 /HPF
SP GR UR STRIP: 1.01 (ref 1–1.03)
UROBILINOGEN UR STRIP-MCNC: NORMAL MG/DL
WBC URINE: >182 /HPF

## 2024-05-31 PROCEDURE — 51798 US URINE CAPACITY MEASURE: CPT

## 2024-05-31 PROCEDURE — 81001 URINALYSIS AUTO W/SCOPE: CPT | Performed by: EMERGENCY MEDICINE

## 2024-05-31 PROCEDURE — 250N000013 HC RX MED GY IP 250 OP 250 PS 637: Performed by: EMERGENCY MEDICINE

## 2024-05-31 PROCEDURE — 99284 EMERGENCY DEPT VISIT MOD MDM: CPT | Mod: 25

## 2024-05-31 PROCEDURE — 87086 URINE CULTURE/COLONY COUNT: CPT | Performed by: EMERGENCY MEDICINE

## 2024-05-31 PROCEDURE — 51702 INSERT TEMP BLADDER CATH: CPT

## 2024-05-31 PROCEDURE — 250N000009 HC RX 250: Performed by: EMERGENCY MEDICINE

## 2024-05-31 RX ORDER — LIDOCAINE HYDROCHLORIDE 20 MG/ML
20 JELLY TOPICAL
Status: COMPLETED | OUTPATIENT
Start: 2024-05-31 | End: 2024-05-31

## 2024-05-31 RX ORDER — ACETAMINOPHEN 325 MG/1
650 TABLET ORAL ONCE
Status: COMPLETED | OUTPATIENT
Start: 2024-05-31 | End: 2024-05-31

## 2024-05-31 RX ADMIN — LIDOCAINE HYDROCHLORIDE 20 ML: 20 JELLY TOPICAL at 05:35

## 2024-05-31 RX ADMIN — ACETAMINOPHEN 650 MG: 325 TABLET, FILM COATED ORAL at 05:42

## 2024-05-31 ASSESSMENT — COLUMBIA-SUICIDE SEVERITY RATING SCALE - C-SSRS
6. HAVE YOU EVER DONE ANYTHING, STARTED TO DO ANYTHING, OR PREPARED TO DO ANYTHING TO END YOUR LIFE?: NO
2. HAVE YOU ACTUALLY HAD ANY THOUGHTS OF KILLING YOURSELF IN THE PAST MONTH?: NO
1. IN THE PAST MONTH, HAVE YOU WISHED YOU WERE DEAD OR WISHED YOU COULD GO TO SLEEP AND NOT WAKE UP?: NO

## 2024-05-31 ASSESSMENT — ACTIVITIES OF DAILY LIVING (ADL)
ADLS_ACUITY_SCORE: 38
ADLS_ACUITY_SCORE: 36

## 2024-05-31 NOTE — TELEPHONE ENCOUNTER
[facility]    Reason for call: Lab Result Notification     Lab Result (including Rx patient on, if applicable).  If culture, copy of lab report at bottom.  Lab Result: UA  RX NA  Creatinine Level (mg/dl)   Creatinine   Date Value Ref Range Status   09/11/2021 0.81 0.52 - 1.04 mg/dL Final   04/26/2021 0.66 0.52 - 1.04 mg/dL Final     Creatinine POCT   Date Value Ref Range Status   01/25/2024 0.7 0.5 - 1.0 mg/dL Final    Creatinine clearance (ml/min), if applicable    Serum creatinine: 0.7 mg/dL 01/25/24 1239  Estimated creatinine clearance: 101.1 mL/min    RN Recommendations/Instructions per [facility] lab result protocol:   [facility] lab result protocol utilized: Misc  Notified Aga of UA results.   Will route to urology.  Component      Latest Ref Rng 5/31/2024  5:40 AM   Color Urine      Colorless, Straw, Light Yellow, Yellow  Dark Brown !    Appearance Urine      Clear  Cloudy !    Glucose Urine      Negative mg/dL Negative    Bilirubin Urine      Negative  Negative    Ketones Urine      Negative mg/dL Trace !    Specific Gravity Urine      1.003 - 1.035  1.009    Blood Urine      Negative  Large !    pH Urine      5.0 - 7.0  6.5    Protein Albumin Urine      Negative mg/dL 70 !    Urobilinogen mg/dL      Normal, 2.0 mg/dL Normal    Nitrite Urine      Negative  Negative    Leukocyte Esterase Urine      Negative  Small !    RBC Urine      <=2 /HPF >182 (H)    WBC Urine      <=5 /HPF >182 (H)       Legend:  ! Abnormal  (H) High

## 2024-05-31 NOTE — ED TRIAGE NOTES
Here for possible blockages in bladder, states unable to empty bladder as of 0400. Patient had bladder/ureter sx on Friday and had stopped bleeding until yesterday when it re-started. Now having pain as well as feeling as though she voided out clots at 0400 with larger amounts of blood. Last complete emptying of bladder last PM prior to bed. Last tylenol around 1800.

## 2024-05-31 NOTE — ED NOTES
Placed 3 way Bhandari cath to pt. Seen Red colored bloody urine approx 800ml with small amount of clots. Flushed 950ml of sterile solution until it was clear.

## 2024-05-31 NOTE — ED PROVIDER NOTES
"  Emergency Department Note      History of Present Illness     Chief Complaint  Post-op Problem    HPI  Aga Alanis is a 44 year old female who presents with a post-op problem. Patient states that she had bilateral ureteralocele procedure on 05/24/2024. Patient states that she was fine up until this morning at 0330 when she went to use the restroom. Patient states she felt a significant genital pain and she wiped and noted a pink hue on the tissue. Patient reports that she urinated and stopped midstream when she like a \"clot came out.\" Patient states that she since has had the sensation of wanting to urinate but cannot. Of note, patient states he has a history of deep vein thrombosis.    Independent Historian  None    Review of External Notes  I reviewed patient's operation note on 05/24/2024 regarding her urethrotomy    Past Medical History   Medical History and Problem List  Past Medical History:   Diagnosis Date    Anxiety     Depression     DVT (deep venous thrombosis)     Gastroesophageal reflux disease     Iron deficiency anemia     Kidney stone     NNEKA (obstructive sleep apnea)     Ovarian cyst     PCOS (polycystic ovarian syndrome)     Recurrent cold sores        Medications  ALPRAZolam (XANAX) 0.25 MG tablet  Biotin 10 MG TABS tablet  busPIRone (BUSPAR) 15 MG tablet  lactobacillus rhamnosus, GG, (CULTURELL) capsule  lamoTRIgine (LAMICTAL) 100 MG tablet  phentermine 30 MG capsule  spironolactone (ALDACTONE) 100 MG tablet  TraZODone & Diet Manage Prod (TRAZAMINE PO)        Surgical History   Past Surgical History:   Procedure Laterality Date    ARTHROSCOPY KNEE Right     Breast augmentation and abdominoplasty  2017    CONIZATION LEEP      CYSTOSCOPY, INTERNAL URETHROTOMY, COMBINED Bilateral 05/24/2024    Procedure: Cystoscopy with Bilateral Ureterocele Incision;  Surgeon: Aguilar Stoddard MD;  Location: [facility]    EXAM UNDER ANESTHESIA PELVIC N/A 12/29/2018    Procedure: Vaginal exam under anesthesia, " "repair of vaginal cuff;  Surgeon: Bev Fischer MD;  Location: [facility] OR    HYSTERECTOMY      LAPAROSCOPIC GASTRIC SLEEVE N/A 04/26/2021    Procedure: GASTRECTOMY, SLEEVE, LAPAROSCOPIC;  Surgeon: Davi Dee MD;  Location: [facility] OR    LAPAROSCOPIC HERNIORRHAPHY HIATAL N/A 04/26/2021    Procedure: HERNIORRHAPHY, HIATAL, LAPAROSCOPIC;  Surgeon: Davi Dee MD;  Location: [facility]    Laparoscopy for ovarian cyst removal      Myringotomy with tube placement      REPAIR LACERATION VAGINA N/A 12/29/2018    Procedure: Repair of vaginal laceration;  Surgeon: Bev Fischer MD;  Location: [facility] OR    Tubal ligation NOS Bilateral      Physical Exam   Patient Vitals for the past 24 hrs:   BP Temp Temp src Pulse Resp SpO2 Height Weight   05/31/24 0446 114/69 97.8  F (36.6  C) Temporal 83 18 100 % 1.626 m (5' 4\") 63.8 kg (140 lb 10.5 oz)     Physical Exam  Nursing note and vitals reviewed.  Constitutional: Cooperative.  Uncomfortable appearing  Cardiovascular: Normal rate, regular rhythm and normal heart sounds.  No murmur.  Pulmonary/Chest: Effort normal and breath sounds normal. No respiratory distress. No wheezes. No rales.   Abdominal: No distention.  Suprapubic tenderness  Neurological: Alert.  Oriented x 3  Skin: Skin is warm and dry.   Psychiatric: Normal mood and affect.     Diagnostics   Lab Results   Urinalysis and culture pending    Imaging  No orders to display     Independent Interpretation  None  ED Course    Medications Administered  Medications - No data to display    Discussion of Management  None    Social Determinants of Health adding to complexity of care  None    ED Course  ED Course as of 05/31/24 0615   Fri May 31, 2024   0455 I obtained history and examined the patient as noted above     0515    upon placement of Bhandari catheter approximately 700 cc of bloody urine obtained.  Patient feels better with this decompression  0615    rechecked the patient.  Urine is now clearing with minimal blood tinge " to it.  We will remove the catheter    Medical Decision Making / Diagnosis     MDM  Aga Alanis is a 44 year old female who presents with acute urine retention due to small bleeding in the bladder lumen likely from the postsurgical site given that she has 7 weeks out from the ureteral procedure.  Bladder was decompressed with a Bhandari catheter and irrigated.  Urine is now clear.  No evidence of ongoing hemorrhage.  She has no fever and vital signs are otherwise reassuring.  After a discussion we will remove the catheter as I feel the risks of leaving the catheter in place are more than her reoccluding at this point.  Urine was sent but will not  at this time.  Culture will be important though I have very low clinical suspicion for infection.  Questions answered and they will be discharged home    Disposition  The patient was discharged.     ICD-10 Codes:    ICD-10-CM    1. Acute retention of urine  R33.8            Scribe Disclosure:  Anthony SALAZAR, am serving as a scribe at 4:56 AM on 5/31/2024 to document services personally performed by Neel Atkinson MD based on my observations and the provider's statements to me.        Neel Atkinson MD  05/31/24 0617

## 2024-06-01 LAB — BACTERIA UR CULT: NO GROWTH

## 2024-06-04 ENCOUNTER — TELEPHONE (OUTPATIENT)
Dept: UROLOGY | Facility: CLINIC | Age: 44
End: 2024-06-04
Payer: COMMERCIAL

## 2024-06-05 NOTE — TELEPHONE ENCOUNTER
Spoke to Aga.  She reports symptoms of bleeding are decreasing, bleeding is more noticeable in the morning.  Pain is well controlled.  She will reach out to us if she feels like things are not continuing to improve.      Kerry Ng RN

## 2024-06-11 ENCOUNTER — PRE VISIT (OUTPATIENT)
Dept: UROLOGY | Facility: CLINIC | Age: 44
End: 2024-06-11
Payer: COMMERCIAL

## 2024-06-23 ENCOUNTER — HEALTH MAINTENANCE LETTER (OUTPATIENT)
Age: 44
End: 2024-06-23

## 2024-06-24 ENCOUNTER — HOSPITAL ENCOUNTER (OUTPATIENT)
Dept: ULTRASOUND IMAGING | Facility: CLINIC | Age: 44
Discharge: HOME | End: 2024-06-24
Attending: UROLOGY | Admitting: UROLOGY
Payer: COMMERCIAL

## 2024-06-24 PROCEDURE — 76770 US EXAM ABDO BACK WALL COMP: CPT

## 2024-06-26 ENCOUNTER — VIRTUAL VISIT (OUTPATIENT)
Dept: UROLOGY | Facility: CLINIC | Age: 44
End: 2024-06-26
Payer: COMMERCIAL

## 2024-06-26 DIAGNOSIS — N39.0 RECURRENT UTI: Primary | ICD-10-CM

## 2024-06-26 DIAGNOSIS — N28.89 URETEROCELE: ICD-10-CM

## 2024-06-26 PROCEDURE — 99214 OFFICE O/P EST MOD 30 MIN: CPT | Mod: 95 | Performed by: UROLOGY

## 2024-06-26 ASSESSMENT — PAIN SCALES - GENERAL: PAINLEVEL: NO PAIN (0)

## 2024-06-26 NOTE — PROGRESS NOTES
Virtual Visit Details    Type of service:  Video Visit   Video Start Time: 245p  Video End Time:300p    Originating Location (pt. 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  We did excision of ureterocele 5/24/24.  Postoperative, she experienced intermittent gross hematuria which has now resolved.    She feels well.  Flank pain is present but less noticeable.  No UTIs since surgery.  She had preoperative mild hydronephrosis on CT.  She recently underwent renal US 2 days ago which shows no hydronephrosis at all. I personally reviewed this.    Exam:           Constitutional - No apparent distress. Patient of stated age.               Eyes - no redness or discharge.              Respiratory - no cough. no labored breathing              Musculoskeletal - full range of motion in all extremities              Skin - no visible skin discoloration or lesions              Neurological - no tremors              Psychiatric - no anxiety, alert & oriented                A/P:  Bilateral ureteroceles s/p excision of ureteroceles.  I reviewed renal US today. It looks great.  Patient's symptoms also better.  I recommend renal US at 6 months with virtual visit and with monitoring for UTI symptoms.  I also ordered a standing UA/UCX if symptoms present for patient.    Aguilar Stoddard MD

## 2024-06-26 NOTE — LETTER
6/26/2024       RE: Aga Alanis  [id number] [facility] [id number]     Dear Colleague,    Thank you for referring your patient, Aga Alanis, to the [facility] [place] [facility]. Please see a copy of my visit note below.    Virtual Visit Details    Type of service:  Video Visit   Video Start Time: 245p  Video End Time:300p    Originating Location (pt. 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  We did excision of ureterocele 5/24/24.  Postoperative, she experienced intermittent gross hematuria which has now resolved.    She feels well.  Flank pain is present but less noticeable.  No UTIs since surgery.  She had preoperative mild hydronephrosis on CT.  She recently underwent renal US 2 days ago which shows no hydronephrosis at all. I personally reviewed this.    Exam:           Constitutional - No apparent distress. Patient of stated age.               Eyes - no redness or discharge.              Respiratory - no cough. no labored breathing              Musculoskeletal - full range of motion in all extremities              Skin - no visible skin discoloration or lesions              Neurological - no tremors              Psychiatric - no anxiety, alert & oriented                A/P:  Bilateral ureteroceles s/p excision of ureteroceles.  I reviewed renal US today. It looks great.  Patient's symptoms also better.  I recommend renal US at 6 months with virtual visit and with monitoring for UTI symptoms.  I also ordered a standing UA/UCX if symptoms present for patient.    Aguilar Stoddard MD

## 2024-06-26 NOTE — NURSING NOTE
Is the patient currently in the state of [place]? YES    Visit mode:VIDEO    If the visit is dropped, the patient can be reconnected by: VIDEO VISIT: Text to cell phone:   Telephone Information:   Mobile [phone]       Will anyone else be joining the visit? NO  (If patient encounters technical issues they should call [phone]    How would you like to obtain your AVS? MyChart    Are changes needed to the allergy or medication list? No    Are refills needed on medications prescribed by this physician? NO    Reason for visit: RECHECK Shelby Kocher [facility]

## 2024-07-01 NOTE — TELEPHONE ENCOUNTER
Patient confirmed scheduled appointment:  Date: 1/3/25  Time: 2:45  Visit type: RTN VV  Provider: Richi  [place] [facility]  Testing/imaging: MARK ANTHONY Prior  Additional notes: NA

## 2024-09-11 ENCOUNTER — TELEPHONE (OUTPATIENT)
Dept: UROLOGY | Facility: CLINIC | Age: 44
End: 2024-09-11
Payer: COMMERCIAL

## 2024-09-11 NOTE — TELEPHONE ENCOUNTER
Left Voicemail (1st Attempt) and Sent Mychart (1st Attempt) for the patient to call back and reschedule the following:    Appointment type: Return pain - Video visit if preferred by patient  Provider(s): Dr. Aguilar Stoddard   Date: 1/3/25  Specialty phone number: [phone]

## 2024-09-23 NOTE — TELEPHONE ENCOUNTER
REFERRAL INFORMATION:  Referring Provider:    Referring Clinic:   Reason for Visit/Diagnosis: Reestablish Care        FUTURE VISIT INFORMATION:  Appointment Date: 1/16/2024   Appointment Time: 10:30AM      NOTES RECORD STATUS  DETAILS   OFFICE NOTE from Referring Provider     OFFICE NOTE from Other Specialists Internal    HOSPITAL DISCHARGE SUMMARY/ ED VISITS  Internal 4/26/2021 S/P laparoscopic sleeve gastrectomy    PERTINENT LABS Internal Labs last updated on 6/26/2024      Records Requested               
6

## 2024-09-24 ENCOUNTER — TELEPHONE (OUTPATIENT)
Dept: UROLOGY | Facility: CLINIC | Age: 44
End: 2024-09-24
Payer: COMMERCIAL

## 2024-09-24 NOTE — TELEPHONE ENCOUNTER
Patient confirmed rescheduled appointment from 1/3 to 1/15 for 6 month follow up with Dr. Stoddard. Per providers reschedules.

## 2024-10-03 ENCOUNTER — TELEPHONE (OUTPATIENT)
Dept: FAMILY MEDICINE | Facility: CLINIC | Age: 44
End: 2024-10-03
Payer: COMMERCIAL

## 2024-10-03 NOTE — TELEPHONE ENCOUNTER
"Pt called in to schedule a sexual medicine consult with Dr. Rodriguez for dx: low libido. Pt also mentioned a recent history of pain and dryness as well as a feeling of \"shallowness\" with sex. Pt states these symptoms were treated with topical/cream testosterone applied to her labia. Writer emailed pt Medical Hx form and pt stated she will stop by her clinic where she was treated to request them to send records.     Routing to Aileen Garcia on 10/3/2024 at 3:21 PM    "

## 2024-10-14 ENCOUNTER — TELEPHONE (OUTPATIENT)
Dept: FAMILY MEDICINE | Facility: CLINIC | Age: 44
End: 2024-10-14
Payer: COMMERCIAL

## 2024-10-14 NOTE — TELEPHONE ENCOUNTER
[facility] Call Center    Phone Message    Reason for Call:   Contacted pt regarding Rodriguez's new patient intake paperwork    Action Taken: spoke w/ pt, pt will send paperwork ASAP.    Date of Service: 10/14/2024 Jefferson Kothari

## 2024-10-15 ENCOUNTER — TELEPHONE (OUTPATIENT)
Dept: ENDOCRINOLOGY | Facility: CLINIC | Age: 44
End: 2024-10-15

## 2024-10-15 ENCOUNTER — VIRTUAL VISIT (OUTPATIENT)
Dept: ENDOCRINOLOGY | Facility: CLINIC | Age: 44
End: 2024-10-15
Payer: COMMERCIAL

## 2024-10-15 VITALS — HEIGHT: 64 IN | WEIGHT: 142 LBS | BODY MASS INDEX: 24.24 KG/M2

## 2024-10-15 DIAGNOSIS — Z86.39 HISTORY OF MORBID OBESITY: Primary | ICD-10-CM

## 2024-10-15 DIAGNOSIS — Z98.84 S/P LAPAROSCOPIC SLEEVE GASTRECTOMY: ICD-10-CM

## 2024-10-15 PROCEDURE — 99214 OFFICE O/P EST MOD 30 MIN: CPT | Mod: 95 | Performed by: NURSE PRACTITIONER

## 2024-10-15 PROCEDURE — G2211 COMPLEX E/M VISIT ADD ON: HCPCS | Mod: 95 | Performed by: NURSE PRACTITIONER

## 2024-10-15 RX ORDER — TRAZODONE HYDROCHLORIDE 50 MG/1
TABLET, FILM COATED ORAL
COMMUNITY
Start: 2024-02-10

## 2024-10-15 ASSESSMENT — PAIN SCALES - GENERAL: PAINLEVEL: NO PAIN (0)

## 2024-10-15 NOTE — LETTER
2024    To: Eric    RE: Aga Alanis  [address] [place] [address]  : 1980  MRN: [id number]    To Whom It May Concern,    I am writing on behalf of my patient, Aga Alanis to document the medical necessity of Zepbound for the treatment of obesity. This letter provides information about the patient's medical history and diagnosis and a statement summarizing my treatment rationale.     Summary of Patient History and Diagnosis  I had the pleasure of seeing your patient, Aga Alanis, in my post-bariatric surgery assessment clinic. Did fantastic with weight maintance at comfortable devyn of 127lb. With entering perimenopause she has been working with OB to management symptoms. She was started on testosterone and progesterone cream along with several supplements about 2-3 months ago and has since noticed dramatic increase in hunger and thoughts about food. Weight gain abruptly assciated with that. Would like to discuss options for  antiobesity medications.      OB started her on topiramate/ phentermine both of which she had significant side effects on. With review, OB started her on 30mg and then reduced to 15mg of phentermine. When she took phentermine preop she took 8mg capsule once daily. So, she may tolerate much lower dose of phentermine in the future but she is very hesitant to try this again right now due to extreme nature of side effects. Avoiding bupropion due to high level of anxiety right now. We discussed naltrexone and GLP1/GIP.  had taken wegovy and zepbound and she'd like to consider the same. She'd like to start with zepbound. No hx pancreatitis, no personal or family hx MTC or MEN2. No GERD. Constipation manageable.      CHIEF COMPLAINT: Post-bariatric surgery follow-up. New Bariatric Surgery consult with Zuri Dodd PA-C 2019 BMI 37.89 with PCOS, sleep apnea, and joint pain. S/p Laparoscopic sleeve gastrectomy  with Dr. Dee  "4/26/2021.    Estimated body mass index is 24.37 kg/m  as calculated from the following:    Height as of an earlier encounter on 10/15/24: 1.626 m (5' 4\").    Weight as of an earlier encounter on 10/15/24: 64.4 kg (142 lb).       Message 9/2024- started on hormone therapy by OB (testosterone and estrogen cream) and has increased hunger      Low weight after surgery 120- happy here. High weight since surgery was 127 until the last couple months - rapid gain since starting testosterone and progesterone AND supplements: magnesium, L-Thylline, ashwaganda, vitamin D, phosphatidyl, and Bcomplex (paused because of anxiety)         Treatment Rationale  Initial BMI 37.89 with PCOS, sleep apnea, and joint pain. S/p Laparoscopic sleeve gastrectomy with Dr. Dee 4/26/2021. Obesity is a chronic condition. Patient has lost weight with bariatric surgery and medical weight management in our comprehensive weight management program. Without the use of Zepbound, patient is at increased risk for weight regain.       Duration  Up to 12 months.      Summary  In summary, Zepbound is medically necessary for this patient s medical condition. Please call my office at [phone] if I can provide you with any additional information to approve my request. I look forward to receiving your timely response and approval of this request.     Sincerely,    Trinh Davidson CNP  "

## 2024-10-15 NOTE — TELEPHONE ENCOUNTER
PA Initiation    Medication: ZEPBOUND 2.5 MG/0.5ML SC SOAJ  Insurance Company: Eric - Phone [phone] Fax [phone]  Pharmacy Filling the Rx:    Filling Pharmacy Phone:    Filling Pharmacy Fax:    Start Date: 10/15/2024    [id number]

## 2024-10-15 NOTE — PATIENT INSTRUCTIONS
20g protein 3 times a day  64oz fluids daily - maybe add electrolytes  Update labs   Start zepbound if covered   Follow up with Zuri Dodd PA-C 1/16/2025     Zepbound (Tirzepatide)    Zepbound (Tirzepatide): is an injectable prescription medicine recently FDA approved for adults with obesity or overweight with an additional condition affected by their weight (type 2 diabetes, high blood pressure, high cholesterol, obstructive sleep apnea, etc). Zepbound contains the same active ingredient as what is in Mounjaro, an injectable FDA approved for Type 2 Diabtes. Zepbound is intended to be used along with dietary/behavioral changes and consistent exercise to improve assist with weight loss and other health improvement outcomes.     It is given as a shot once a week. It activates the body's receptors for GIP (glucose-dependent insulinotropic polypeptide) and GLP-1 (glucagon-like peptide-1) receptor, two naturally occurring hormones that help tell the brain that you are full. It also works is by slowing down how quickly food leaves your stomach. What this means for you: You will start to feel alegria more quickly, notice portion changes and eat less often between meals. It is still important to maintain consistent eating schedule with meals +/- snacks and get in good hydration.      Dosing:  Initial dose: 2.5 mg injected subcutaneously once weekly for 4 weeks  Further dose changes can include: increase to 5 mg once weekly for 4 weeks, then 7.5 mg once weekly for 4 weeks, then 10 mg once weekly for 4 weeks then 12.5 mg once weekly for 4 weeks, then 15 mg (maximum dose) once weekly.    Dose changes are based on how you are tolerating the current dose, what benefits you are seeing at the current dose and if improvement in effectiveness of the drug is needed. The goal is to find the dose that works best for you with the least amount of side effects. You may find you reach this at a lower dose than the maximum dose.     Side  effects: Patients are advised to eat more slowly and purposefully. Give yourself less portions. You may find after starting this medication you have a new point of fullness. It is also important to stay adequately hydrated on the medication to reduce risks of some of these side effects. Many of these side effects (nausea, diarrhea, etc) occurred during dose escalation but did improve over time with continuing the medication. If side effects are unmanageable, reach out to your prescribing provider.     The risk of pancreatitis (inflammation of the pancreas) has been associated with this type of medication, but is very rare.  If you have had pancreatitis in the past, this medication may not be for you. Please let us know about any past history of pancreas problems. If you experience persistent severe abdominal pain (sometimes radiating to the back potentially accompanied by vomiting and have a fever), stop the medication and contact your provider immediately for assessment. They will do a blood test to check for pancreatitis.       How to Inject:   https://www.zepbound.[employer].com/how-to-use    Storage:   Store your pen in the refrigerator. You may store your pen at room temperature for up to 21 days. If you store the pen at room temperature, do not return the pen to the refrigerator. Discard the pen if not used within 21 days after removing from the refrigerator.      For any questions or concerns please send a [employer] message to our team or call our weight management call center at [phone] during regular business hours.

## 2024-10-15 NOTE — NURSING NOTE
Current patient location: home     Is the patient currently in the state of [place]? YES    Visit mode:VIDEO    If the visit is dropped, the patient can be reconnected by: VIDEO VISIT: Text to cell phone:   Telephone Information:   Mobile [phone]       Will anyone else be joining the visit? NO  (If patient encounters technical issues they should call [phone]    Are changes needed to the allergy or medication list? Yes Please remove meds not marked.    Are refills needed on medications prescribed by this physician? Discuss with provider    Rooming Documentation:  Questionnaire(s) completed      Reason for visit: RECHECK    Wt other than 24 hrs:    Pain more than one location:  no  Светлана CAMPOS

## 2024-10-15 NOTE — PROGRESS NOTES
Virtual Visit Details    Type of service:  Video Visit   Video Start Time:  0745  Video End Time:8:11 AM    Originating Location (pt. Location): Home    Distant Location (provider location):  On-site  Platform used for Video Visit: Julio Cesar

## 2024-10-15 NOTE — LETTER
10/15/2024       RE: Aga Alanis  [address] [place] [address]     Dear Colleague,    Thank you for referring your patient, Aga Alanis, to the [facility] [place] [facility]. Please see a copy of my visit note below.    Return Bariatric Surgery Note    RE: Aga Alanis  MR#: [id number]  : 1980  VISIT DATE: Oct 15, 2024      Dear Michael Abbott,    I had the pleasure of seeing your patient, Aga Alanis, in my post-bariatric surgery assessment clinic.    Assessment & Plan  Problem List Items Addressed This Visit          Other    History of morbid obesity - Primary     Not seen since 1 month postop. Did fantastic with weight maintance at comfortable devyn of 127lb. With entering perimenopause she has been working with OB to management symptoms. She was started on testosterone and progesterone cream along with several supplements about 2-3 months ago and has since noticed dramatic increase in hunger and thoughts about food. Weight gain abruptly assciated with that. Would like to discuss options for  antiobesity medications.     OB started her on topiramate/ phentermine both of which she had significant side effects on. With review, OB started her on 30mg and then reduced to 15mg of phentermine. When she took phentermine preop she took 8mg capsule once daily. So, she may tolerate much lower dose of phentermine in the future but she is very hesitant to try this again right now due to extreme nature of side effects. Avoiding bupropion due to high level of anxiety right now. We discussed naltrexone and GLP1/GIP.  had taken wegovy and zepbound and she'd like to consider the same. She'd like to start with zepbound. No hx pancreatitis, no personal or family hx MTC or MEN2. No GERD. Constipation manageable.     20g protein 3 times a day  64oz fluids daily - maybe add electrolytes  Update  labs   Start zepbound if covered   Follow up with Zuri Dodd PA-C 1/16/2025              Relevant Medications    tirzepatide-Weight Management (ZEPBOUND) 2.5 MG/0.5ML prefilled pen    Other Relevant Orders    CBC with platelets    Comprehensive metabolic panel    Hemoglobin A1c    Parathyroid Hormone Intact    Vitamin A    Vitamin B12    Vitamin D Deficiency    Lipid panel reflex to direct LDL Fasting    Vitamin K    S/P laparoscopic sleeve gastrectomy    Relevant Medications    tirzepatide-Weight Management (ZEPBOUND) 2.5 MG/0.5ML prefilled pen    Other Relevant Orders    CBC with platelets    Comprehensive metabolic panel    Hemoglobin A1c    Parathyroid Hormone Intact    Vitamin A    Vitamin B12    Vitamin D Deficiency    Lipid panel reflex to direct LDL Fasting    Vitamin K          32 minutes spent by me on the date of the encounter doing chart review, history and exam, documentation and further activities per the note    The longitudinal plan of care for the diagnosis(es)/condition(s) as documented were addressed during this visit. Due to the added complexity in care, I will continue to support Aga in the subsequent management and with ongoing continuity of care.    CHIEF COMPLAINT: Post-bariatric surgery follow-up. NBS with Zuri Dodd PA-C 2/13/2019 BMI 37.89 with PCOS, sleep apnea, and joint pain. S/p Laparoscopic sleeve gastrectomy  with Dr. Dee 4/26/2021. Has not been seen in our clinic since 5/25/2021 (1 mo postop).     Message 9/2024- started on hormone therapy by OB (testosterone and estrogen cream) and has increased hunger     Low weight after surgery 120- happy here. High weight since surgery was 127 until the last couple months - rapid gain since starting testosterone and progesterone AND supplements: magnesium, L-Thylline, ashwaganda, vitamin D, phosphatidyl, and Bcomplex (paused because of anxiety)      HISTORY OF PRESENT ILLNESS:      10/14/2024     8:49 AM   Questions Regarding  Prior Weight Loss Surgery Reviewed With Patient   I had the following weight loss procedure Sleeve Gastrectomy   What year was your surgery? 2021   How has your weight changed since your last visit? I have gained weight   Do you currently have any of the following None of the above   Do you have any concerns today? I am gaining weight amd my appetite bas come back very stongly. I am poing through perimenopause and working with a dr on HRT sonce starting HRT the weight gain and appetite have started.     Baseline constipation- magnesium has been helpful, adding fiber and probiotic now     Anti-obesity medications:     Current:   None     Failed/contraindicated:   Topiramate- nightmares   Phentermine- increased anxiety/ depression - panic attacks daily (18.75mg)     Recent diet changes:   Increased hunger   Still not drinking with meals     -Protein- protein first at meals   -Water- daily struggle -     Recent stressors:   Meeting with psychiatrist and therapist     Vitamins/Labs: due     GERD symptoms: none   Vit d and magnesium   B12   No multi with iron   No calcium     Weight History:      10/14/2024     8:49 AM   --   What is your highest lifetime weight? 232   What is your lowest weight since surgery? (In pounds) 121     Initial Weight (lbs): 215 lbs  Weight: 64.4 kg (142 lb)  Last Visits Weight: 75.8 kg (167 lb)  Cumulative weight loss (lbs): 73  Weight Loss Percentage: 33.95%        10/14/2024     8:49 AM   Questions Regarding Co-Morbidities and Health Concerns Reviewed With Patient   Pre-diabetes Never   Diabetes II Never   High Blood Pressure Never   High cholesterol Never   Heartburn/Reflux Gone away   Sleep apnea Gone away   PCOS Improved   Back pain Gone away   Joint pain Improved   Lower leg swelling Improved           10/14/2024     8:49 AM   Eating Habits   How many meals do you eat per day? 3   Do you snack between meals? Yes   How much food are you eating at each meal? Greater than 1 cup   Are you  able to separate your meals and liquids by at least 30 minutes? Sometimes   Are you able to avoid liquid calories? Yes           10/14/2024     8:49 AM   Exercise Questions Reviewed With Patient   How often do you exercise? Less than 1 time per week   What is the duration of your exercise (in minutes)? 20 Minutes   What types of exercise do you do? walking   What keeps you from being more active? Lack of Time    Too tired       Social History:      10/14/2024     8:49 AM   --   Are you smoking? No   Are you drinking alcohol? No       Medications:  Current Outpatient Medications   Medication Sig Dispense Refill     ALPRAZolam (XANAX) 0.25 MG tablet Take 0.25 mg by mouth nightly as needed for anxiety       busPIRone (BUSPAR) 15 MG tablet Take 15 mg by mouth 2 times daily       lactobacillus rhamnosus, GG, (CULTURELL) capsule Take 1 capsule by mouth every morning        lamoTRIgine (LAMICTAL) 100 MG tablet        spironolactone (ALDACTONE) 100 MG tablet Take 100 mg by mouth every morning        tirzepatide-Weight Management (ZEPBOUND) 2.5 MG/0.5ML prefilled pen Inject 0.5 mLs (2.5 mg) subcutaneously every 7 days. 2 mL 1     TraZODone & Diet Manage Prod (TRAZAMINE PO)        traZODone (DESYREL) 50 MG tablet TAKE 1/2 TO 1 TAB BY MOUTH EVERY NIGHT AS NEEDED INSOMNIA       UNABLE TO FIND MEDICATION NAME: **PROGEST-VERSA 80MG/GM       UNABLE TO FIND MEDICATION NAME: testosterone cream       Biotin 10 MG TABS tablet Take 10 mg by mouth every morning        No current facility-administered medications for this visit.         10/14/2024     8:49 AM   --   Do you avoid NSAIDs such as (Ibuprofen, Aleve, Naproxen, Advil)? Yes       ROS:  GI:       10/14/2024     8:49 AM   --   Vomiting No   Diarrhea Yes   Constipation Yes   Swallowing trouble No   Abdominal pain No   Heartburn No     Skin:       10/14/2024     8:49 AM   BAR RBS ROS - SKIN   Rash in skin folds No     Psych:       10/14/2024     8:49 AM   --   Depression Yes  "  Anxiety Yes     Female Only:       10/14/2024     8:49 AM   BAR RBS ROS -    Female only Irregular menstrual cycles    Polycystic ovarian syndrome (PCOS)   Stress urinary incontinence No       Admission on 05/31/2024, Discharged on 05/31/2024   Component Date Value Ref Range Status     Color Urine 05/31/2024 Dark Brown (A)  Colorless, Straw, Light Yellow, Yellow Final     Appearance Urine 05/31/2024 Cloudy (A)  Clear Final     Glucose Urine 05/31/2024 Negative  Negative mg/dL Final     Bilirubin Urine 05/31/2024 Negative  Negative Final     Ketones Urine 05/31/2024 Trace (A)  Negative mg/dL Final     Specific Gravity Urine 05/31/2024 1.009  1.003 - 1.035 Final     Blood Urine 05/31/2024 Large (A)  Negative Final     pH Urine 05/31/2024 6.5  5.0 - 7.0 Final     Protein Albumin Urine 05/31/2024 70 (A)  Negative mg/dL Final     Urobilinogen Urine 05/31/2024 Normal  Normal, 2.0 mg/dL Final     Nitrite Urine 05/31/2024 Negative  Negative Final     Leukocyte Esterase Urine 05/31/2024 Small (A)  Negative Final     RBC Urine 05/31/2024 >182 (H)  <=2 /HPF Final     WBC Urine 05/31/2024 >182 (H)  <=5 /HPF Final     Culture 05/31/2024 No Growth   Final              No data to display                  PHYSICAL EXAM:  Objective   Ht 1.626 m (5' 4\")   Wt 64.4 kg (142 lb)   LMP 06/05/2017   BMI 24.37 kg/m    Vitals - Patient Reported  Pain Score: No Pain (0)      Vitals:  No vitals were obtained today due to virtual visit.    Physical Exam   GENERAL: alert and no distress  EYES: Eyes grossly normal to inspection.  No discharge or erythema, or obvious scleral/conjunctival abnormalities.  RESP: No audible wheeze, cough, or visible cyanosis.    SKIN: Visible skin clear. No significant rash, abnormal pigmentation or lesions.  NEURO: Cranial nerves grossly intact.  Mentation and speech appropriate for age.  PSYCH: Appropriate affect, tone, and pace of words        Sincerely,    Trinh Davidson NP      Virtual Visit Details    Type " of service:  Video Visit   Video Start Time:  0745  Video End Time:8:11 AM    Originating Location (pt. Location): Home    Distant Location (provider location):  On-site  Platform used for Video Visit: Julio Cesar      Again, thank you for allowing me to participate in the care of your patient.      Sincerely,    Trinh Davidson NP

## 2024-10-15 NOTE — PROGRESS NOTES
Return Bariatric Surgery Note    RE: Aga Alanis  MR#: [id number]  : 1980  VISIT DATE: Oct 15, 2024      Dear Michael Abbott,    I had the pleasure of seeing your patient, Aga Alanis, in my post-bariatric surgery assessment clinic.    Assessment & Plan   Problem List Items Addressed This Visit          Other    History of morbid obesity - Primary     Not seen since 1 month postop. Did fantastic with weight maintance at comfortable devyn of 127lb. With entering perimenopause she has been working with OB to management symptoms. She was started on testosterone and progesterone cream along with several supplements about 2-3 months ago and has since noticed dramatic increase in hunger and thoughts about food. Weight gain abruptly assciated with that. Would like to discuss options for  antiobesity medications.     OB started her on topiramate/ phentermine both of which she had significant side effects on. With review, OB started her on 30mg and then reduced to 15mg of phentermine. When she took phentermine preop she took 8mg capsule once daily. So, she may tolerate much lower dose of phentermine in the future but she is very hesitant to try this again right now due to extreme nature of side effects. Avoiding bupropion due to high level of anxiety right now. We discussed naltrexone and GLP1/GIP.  had taken wegovy and zepbound and she'd like to consider the same. She'd like to start with zepbound. No hx pancreatitis, no personal or family hx MTC or MEN2. No GERD. Constipation manageable.     20g protein 3 times a day  64oz fluids daily - maybe add electrolytes  Update labs   Start zepbound if covered   Follow up with Zuri Dodd PA-C 2025              Relevant Medications    tirzepatide-Weight Management (ZEPBOUND) 2.5 MG/0.5ML prefilled pen    Other Relevant Orders    CBC with platelets    Comprehensive metabolic panel    Hemoglobin A1c    Parathyroid Hormone Intact    Vitamin A    Vitamin  B12    Vitamin D Deficiency    Lipid panel reflex to direct LDL Fasting    Vitamin K    S/P laparoscopic sleeve gastrectomy    Relevant Medications    tirzepatide-Weight Management (ZEPBOUND) 2.5 MG/0.5ML prefilled pen    Other Relevant Orders    CBC with platelets    Comprehensive metabolic panel    Hemoglobin A1c    Parathyroid Hormone Intact    Vitamin A    Vitamin B12    Vitamin D Deficiency    Lipid panel reflex to direct LDL Fasting    Vitamin K          32 minutes spent by me on the date of the encounter doing chart review, history and exam, documentation and further activities per the note    The longitudinal plan of care for the diagnosis(es)/condition(s) as documented were addressed during this visit. Due to the added complexity in care, I will continue to support Aga in the subsequent management and with ongoing continuity of care.    CHIEF COMPLAINT: Post-bariatric surgery follow-up. NBS with Zuri Dodd PA-C 2/13/2019 BMI 37.89 with PCOS, sleep apnea, and joint pain. S/p Laparoscopic sleeve gastrectomy  with Dr. Dee 4/26/2021. Has not been seen in our clinic since 5/25/2021 (1 mo postop).     Message 9/2024- started on hormone therapy by OB (testosterone and estrogen cream) and has increased hunger     Low weight after surgery 120- happy here. High weight since surgery was 127 until the last couple months - rapid gain since starting testosterone and progesterone AND supplements: magnesium, L-Thylline, ashwaganda, vitamin D, phosphatidyl, and Bcomplex (paused because of anxiety)      HISTORY OF PRESENT ILLNESS:      10/14/2024     8:49 AM   Questions Regarding Prior Weight Loss Surgery Reviewed With Patient   I had the following weight loss procedure Sleeve Gastrectomy   What year was your surgery? 2021   How has your weight changed since your last visit? I have gained weight   Do you currently have any of the following None of the above   Do you have any concerns today? I am gaining weight amd  my appetite bas come back very stongly. I am poing through perimenopause and working with a dr on HRT sonce starting HRT the weight gain and appetite have started.     Baseline constipation- magnesium has been helpful, adding fiber and probiotic now     Anti-obesity medications:     Current:   None     Failed/contraindicated:   Topiramate- nightmares   Phentermine- increased anxiety/ depression - panic attacks daily (18.75mg)     Recent diet changes:   Increased hunger   Still not drinking with meals     -Protein- protein first at meals   -Water- daily struggle -     Recent stressors:   Meeting with psychiatrist and therapist     Vitamins/Labs: due     GERD symptoms: none   Vit d and magnesium   B12   No multi with iron   No calcium     Weight History:      10/14/2024     8:49 AM   --   What is your highest lifetime weight? 232   What is your lowest weight since surgery? (In pounds) 121     Initial Weight (lbs): 215 lbs  Weight: 64.4 kg (142 lb)  Last Visits Weight: 75.8 kg (167 lb)  Cumulative weight loss (lbs): 73  Weight Loss Percentage: 33.95%        10/14/2024     8:49 AM   Questions Regarding Co-Morbidities and Health Concerns Reviewed With Patient   Pre-diabetes Never   Diabetes II Never   High Blood Pressure Never   High cholesterol Never   Heartburn/Reflux Gone away   Sleep apnea Gone away   PCOS Improved   Back pain Gone away   Joint pain Improved   Lower leg swelling Improved           10/14/2024     8:49 AM   Eating Habits   How many meals do you eat per day? 3   Do you snack between meals? Yes   How much food are you eating at each meal? Greater than 1 cup   Are you able to separate your meals and liquids by at least 30 minutes? Sometimes   Are you able to avoid liquid calories? Yes           10/14/2024     8:49 AM   Exercise Questions Reviewed With Patient   How often do you exercise? Less than 1 time per week   What is the duration of your exercise (in minutes)? 20 Minutes   What types of exercise do  you do? walking   What keeps you from being more active? Lack of Time    Too tired       Social History:      10/14/2024     8:49 AM   --   Are you smoking? No   Are you drinking alcohol? No       Medications:  Current Outpatient Medications   Medication Sig Dispense Refill    ALPRAZolam (XANAX) 0.25 MG tablet Take 0.25 mg by mouth nightly as needed for anxiety      busPIRone (BUSPAR) 15 MG tablet Take 15 mg by mouth 2 times daily      lactobacillus rhamnosus, GG, (CULTURELL) capsule Take 1 capsule by mouth every morning       lamoTRIgine (LAMICTAL) 100 MG tablet       spironolactone (ALDACTONE) 100 MG tablet Take 100 mg by mouth every morning       tirzepatide-Weight Management (ZEPBOUND) 2.5 MG/0.5ML prefilled pen Inject 0.5 mLs (2.5 mg) subcutaneously every 7 days. 2 mL 1    TraZODone & Diet Manage Prod (TRAZAMINE PO)       traZODone (DESYREL) 50 MG tablet TAKE 1/2 TO 1 TAB BY MOUTH EVERY NIGHT AS NEEDED INSOMNIA      UNABLE TO FIND MEDICATION NAME: **PROGEST-VERSA 80MG/GM      UNABLE TO FIND MEDICATION NAME: testosterone cream      Biotin 10 MG TABS tablet Take 10 mg by mouth every morning        No current facility-administered medications for this visit.         10/14/2024     8:49 AM   --   Do you avoid NSAIDs such as (Ibuprofen, Aleve, Naproxen, Advil)? Yes       ROS:  GI:       10/14/2024     8:49 AM   --   Vomiting No   Diarrhea Yes   Constipation Yes   Swallowing trouble No   Abdominal pain No   Heartburn No     Skin:       10/14/2024     8:49 AM   BAR RBS ROS - SKIN   Rash in skin folds No     Psych:       10/14/2024     8:49 AM   --   Depression Yes   Anxiety Yes     Female Only:       10/14/2024     8:49 AM   BAR RBS ROS -    Female only Irregular menstrual cycles    Polycystic ovarian syndrome (PCOS)   Stress urinary incontinence No       Admission on 05/31/2024, Discharged on 05/31/2024   Component Date Value Ref Range Status    Color Urine 05/31/2024 Dark Brown (A)  Colorless, Straw, Light Yellow,  "Yellow Final    Appearance Urine 05/31/2024 Cloudy (A)  Clear Final    Glucose Urine 05/31/2024 Negative  Negative mg/dL Final    Bilirubin Urine 05/31/2024 Negative  Negative Final    Ketones Urine 05/31/2024 Trace (A)  Negative mg/dL Final    Specific Gravity Urine 05/31/2024 1.009  1.003 - 1.035 Final    Blood Urine 05/31/2024 Large (A)  Negative Final    pH Urine 05/31/2024 6.5  5.0 - 7.0 Final    Protein Albumin Urine 05/31/2024 70 (A)  Negative mg/dL Final    Urobilinogen Urine 05/31/2024 Normal  Normal, 2.0 mg/dL Final    Nitrite Urine 05/31/2024 Negative  Negative Final    Leukocyte Esterase Urine 05/31/2024 Small (A)  Negative Final    RBC Urine 05/31/2024 >182 (H)  <=2 /HPF Final    WBC Urine 05/31/2024 >182 (H)  <=5 /HPF Final    Culture 05/31/2024 No Growth   Final              No data to display                  PHYSICAL EXAM:  Objective    Ht 1.626 m (5' 4\")   Wt 64.4 kg (142 lb)   LMP 06/05/2017   BMI 24.37 kg/m    Vitals - Patient Reported  Pain Score: No Pain (0)      Vitals:  No vitals were obtained today due to virtual visit.    Physical Exam   GENERAL: alert and no distress  EYES: Eyes grossly normal to inspection.  No discharge or erythema, or obvious scleral/conjunctival abnormalities.  RESP: No audible wheeze, cough, or visible cyanosis.    SKIN: Visible skin clear. No significant rash, abnormal pigmentation or lesions.  NEURO: Cranial nerves grossly intact.  Mentation and speech appropriate for age.  PSYCH: Appropriate affect, tone, and pace of words        Sincerely,    Trinh Davidson NP    "

## 2024-10-16 NOTE — TELEPHONE ENCOUNTER
PRIOR AUTHORIZATION DENIED    Medication: ZEPBOUND 2.5 MG/0.5ML SC SOAJ  Insurance Company: [employer] - Phone [phone] Fax [phone]  Denial Date: 10/16/2024  Denial Reason(s):   Appeal Information:   Patient Notified:

## 2024-10-16 NOTE — TELEPHONE ENCOUNTER
Medication Appeal Request    Please initiate an appeal for the requested medication: ZEPBOUND 2.5 MG/0.5ML SC SOAJ    Has a letter of medical necessity been completed in EPIC?   yes    Any additional lab values/information to include?     Would you like to include any research articles?               If yes please include the hyperlink(s) below or fax to    [phone] for Specialty/Retail               [phone] for Infusion/Clinic Administered.                Include the patients name and MRN on the fax cover sheet.

## 2024-10-16 NOTE — TELEPHONE ENCOUNTER
Medication Appeal Initiation    Medication: ZEPBOUND 2.5 MG/0.5ML SC SOAJ  Appeal Start Date:  10/16/2024  Insurance Company: [employer] Phone: [phone]  Insurance Fax: [phone]  Comments:    Appeal letter faxed to [phone]

## 2024-10-16 NOTE — ASSESSMENT & PLAN NOTE
Not seen since 1 month postop. Did fantastic with weight maintance at comfortable devyn of 127lb. With entering perimenopause she has been working with OB to management symptoms. She was started on testosterone and progesterone cream along with several supplements about 2-3 months ago and has since noticed dramatic increase in hunger and thoughts about food. Weight gain abruptly assciated with that. Would like to discuss options for  antiobesity medications.     OB started her on topiramate/ phentermine both of which she had significant side effects on. With review, OB started her on 30mg and then reduced to 15mg of phentermine. When she took phentermine preop she took 8mg capsule once daily. So, she may tolerate much lower dose of phentermine in the future but she is very hesitant to try this again right now due to extreme nature of side effects. Avoiding bupropion due to high level of anxiety right now. We discussed naltrexone and GLP1/GIP.  had taken wegovy and zepbound and she'd like to consider the same. She'd like to start with zepbound. No hx pancreatitis, no personal or family hx MTC or MEN2. No GERD. Constipation manageable.     20g protein 3 times a day  64oz fluids daily - maybe add electrolytes  Update labs   Start zepbound if covered   Follow up with Zuri Dodd PA-C 1/16/2025

## 2024-10-17 ENCOUNTER — TRANSFERRED RECORDS (OUTPATIENT)
Dept: FAMILY MEDICINE | Facility: CLINIC | Age: 44
End: 2024-10-17
Payer: COMMERCIAL

## 2024-10-17 NOTE — PROGRESS NOTES
Medical records from [facility] received.   No appointment has been scheduled.      Aileen Richardson,CMA

## 2024-10-18 ENCOUNTER — TELEPHONE (OUTPATIENT)
Dept: FAMILY MEDICINE | Facility: CLINIC | Age: 44
End: 2024-10-18
Payer: COMMERCIAL

## 2024-10-18 NOTE — CONFIDENTIAL NOTE
Telephone Intake--REST  Date:   Client Name:  Aga Alanis   MRN: [id number]  : 1980       Age:  44 year old      Presenting Problem / Reason for Assessment   (Clinical History &Symptoms):             Length of time experiencing symptoms:       What is the Main Goal for the visit? [Examples: looking for physical cause of problem? Looking for medical options?  Understanding symptoms and treatments?]     Have you seen another medical provider for this or a related issue?   -- If yes, are you looking for a 2nd opinion or something else?       Have you seen a Mental Health Provider for this issue? (if so, where):    Therapist:    --if yes, request a referral or summary letter from the therapist at the 1st session..    Psychiatrist:    --if yes,, request records from the provider at the 1st session..      Other Medical/Mental Health Diagnoses:        If needed, clarify if patient calling from group home or other supervised living arrangement  Note if patient has no mental health or cognitive diagnosis, but phone behavior suggests impairment      Medications:          Primary Care Provider:   Provider and Clinic Name:    --If multiple medical conditions, request recent records from primary doctor at the 1st session..      Referral Source:        Follow Up:    -Please Verify Registration  -Send necessary paperwork to pt's email    *Please let pt know that Dr. Rodriguez will NOT prescribe medication or create a treatment plan until paperwork is received. If we do not have completed forms before your first session, additional appointments will be scheduled until we receive  completed forms.*

## 2024-10-22 NOTE — TELEPHONE ENCOUNTER
Update: Called [phone]([facility]) and the spoke to Rep Gomez? She stated that the appeal is still in review. They have up to 30 days to respond with a determination.    Appeal# [id number]    Thank You!    Zi Garrett [facility] Pharmacy Liaison  [facility]  [email or web address]  Phone: [phone]  Fax: [phone]

## 2024-10-23 ENCOUNTER — LAB (OUTPATIENT)
Dept: LAB | Facility: CLINIC | Age: 44
End: 2024-10-23
Payer: COMMERCIAL

## 2024-10-23 DIAGNOSIS — Z86.39 HISTORY OF MORBID OBESITY: ICD-10-CM

## 2024-10-23 DIAGNOSIS — Z98.84 S/P LAPAROSCOPIC SLEEVE GASTRECTOMY: ICD-10-CM

## 2024-10-23 LAB
ALBUMIN SERPL BCG-MCNC: 4.5 G/DL (ref 3.5–5.2)
ALP SERPL-CCNC: 46 U/L (ref 40–150)
ALT SERPL W P-5'-P-CCNC: 10 U/L (ref 0–50)
ANION GAP SERPL CALCULATED.3IONS-SCNC: 11 MMOL/L (ref 7–15)
AST SERPL W P-5'-P-CCNC: 17 U/L (ref 0–45)
BILIRUB SERPL-MCNC: 0.4 MG/DL
BUN SERPL-MCNC: 9.4 MG/DL (ref 6–20)
CALCIUM SERPL-MCNC: 9.7 MG/DL (ref 8.8–10.4)
CHLORIDE SERPL-SCNC: 103 MMOL/L (ref 98–107)
CHOLEST SERPL-MCNC: 194 MG/DL
CREAT SERPL-MCNC: 0.78 MG/DL (ref 0.51–0.95)
EGFRCR SERPLBLD CKD-EPI 2021: >90 ML/MIN/1.73M2
ERYTHROCYTE [DISTWIDTH] IN BLOOD BY AUTOMATED COUNT: 12.2 % (ref 10–15)
EST. AVERAGE GLUCOSE BLD GHB EST-MCNC: 108 MG/DL
FASTING STATUS PATIENT QL REPORTED: YES
FASTING STATUS PATIENT QL REPORTED: YES
GLUCOSE SERPL-MCNC: 98 MG/DL (ref 70–99)
HBA1C MFR BLD: 5.4 % (ref 0–5.6)
HCO3 SERPL-SCNC: 23 MMOL/L (ref 22–29)
HCT VFR BLD AUTO: 41.6 % (ref 35–47)
HDLC SERPL-MCNC: 87 MG/DL
HGB BLD-MCNC: 13.9 G/DL (ref 11.7–15.7)
LDLC SERPL CALC-MCNC: 91 MG/DL
MCH RBC QN AUTO: 31.5 PG (ref 26.5–33)
MCHC RBC AUTO-ENTMCNC: 33.4 G/DL (ref 31.5–36.5)
MCV RBC AUTO: 94 FL (ref 78–100)
NONHDLC SERPL-MCNC: 107 MG/DL
PLATELET # BLD AUTO: 230 10E3/UL (ref 150–450)
POTASSIUM SERPL-SCNC: 4.4 MMOL/L (ref 3.4–5.3)
PROT SERPL-MCNC: 7.1 G/DL (ref 6.4–8.3)
PTH-INTACT SERPL-MCNC: 22 PG/ML (ref 15–65)
RBC # BLD AUTO: 4.41 10E6/UL (ref 3.8–5.2)
SODIUM SERPL-SCNC: 137 MMOL/L (ref 135–145)
TRIGL SERPL-MCNC: 81 MG/DL
VIT B12 SERPL-MCNC: 442 PG/ML (ref 232–1245)
VIT D+METAB SERPL-MCNC: 51 NG/ML (ref 20–50)
WBC # BLD AUTO: 4.2 10E3/UL (ref 4–11)

## 2024-10-23 PROCEDURE — 36415 COLL VENOUS BLD VENIPUNCTURE: CPT | Mod: 90

## 2024-10-23 PROCEDURE — 83970 ASSAY OF PARATHORMONE: CPT

## 2024-10-23 PROCEDURE — 84590 ASSAY OF VITAMIN A: CPT | Mod: 90

## 2024-10-23 PROCEDURE — 84597 ASSAY OF VITAMIN K: CPT | Mod: 90

## 2024-10-23 PROCEDURE — 85027 COMPLETE CBC AUTOMATED: CPT

## 2024-10-23 PROCEDURE — 99000 SPECIMEN HANDLING OFFICE-LAB: CPT

## 2024-10-23 PROCEDURE — 80061 LIPID PANEL: CPT

## 2024-10-23 PROCEDURE — 82306 VITAMIN D 25 HYDROXY: CPT

## 2024-10-23 PROCEDURE — 82607 VITAMIN B-12: CPT

## 2024-10-23 PROCEDURE — 80053 COMPREHEN METABOLIC PANEL: CPT

## 2024-10-23 PROCEDURE — 83036 HEMOGLOBIN GLYCOSYLATED A1C: CPT

## 2024-10-26 LAB
ANNOTATION COMMENT IMP: NORMAL
RETINYL PALMITATE SERPL-MCNC: <0.02 MG/L
VIT A SERPL-MCNC: 0.42 MG/L

## 2024-10-28 LAB — PHYTONADIONE SERPL-MCNC: 0.49 NMOL/L

## 2024-10-31 NOTE — TELEPHONE ENCOUNTER
Update: No determination yet, will check 11/12/24.    Thank You!    Zi Garrett [facility] Pharmacy Liaison  [facility]  [email or web address]  Phone: [phone]  Fax: [phone]

## 2024-11-04 ENCOUNTER — TELEPHONE (OUTPATIENT)
Dept: ENDOCRINOLOGY | Facility: CLINIC | Age: 44
End: 2024-11-04
Payer: COMMERCIAL

## 2024-11-04 NOTE — TELEPHONE ENCOUNTER
Prior Authorization Approval    Medication: ZEPBOUND 2.5 MG/0.5ML SC SOAJ  Authorization Effective Date: 11/4/2024  Authorization Expiration Date: 11/4/2025  Approved Dose/Quantity: 2ml per 28 days  Reference #: [id number]   Insurance Company: [employer] - Phone [phone] Fax [phone]  Expected CoPay: $ 60  CoPay Card Available:      Financial Assistance Needed:   Which Pharmacy is filling the prescription:    Pharmacy Notified:   Patient Notified:

## 2024-11-04 NOTE — TELEPHONE ENCOUNTER
PA Initiation    Medication: ZEPBOUND 2.5 MG/0.5ML SC SOAJ  Insurance Company: [employer] - Phone [phone] Fax [phone]  Pharmacy Filling the Rx:    Filling Pharmacy Phone:    Filling Pharmacy Fax:    Start Date: 11/4/2024    [id number]

## 2024-11-06 NOTE — TELEPHONE ENCOUNTER
Update: Still no determination for Appeal through [facility] but did get approval through patient's primary insurance([employer]). Will check on 11/12/24.    Thank You!    Zi Garrett [facility] Pharmacy Liaison  [facility] [email or web address]  Phone: [phone]  Fax: [phone]

## 2024-11-11 NOTE — TELEPHONE ENCOUNTER
[employer] HAS DENIED THE APPEAL; STILL APPROVED THROUGH PATIENTS PRIMARY [employer]    MEDICATION APPEAL DENIED    Medication: ZEPBOUND 2.5 MG/0.5ML SC SOAJ  Insurance Company: [employer]  Denial Date:  11/11/24  Denial Reason(s):   Second Level Appeal Information:   Patient Notified:   Central Prior Authorization Team ONLY: Second level appeals will be managed by the clinic staff and provider. Please contact the [employer] Prior Authorization Team if additional information about the denial is needed.

## 2024-11-11 NOTE — TELEPHONE ENCOUNTER
Should still be $60 from [employer] coverage but can't get a result from test claim because it's been filled by [employer] already.        Test claim(11/11/24):

## 2024-12-16 ENCOUNTER — VIRTUAL VISIT (OUTPATIENT)
Dept: ENDOCRINOLOGY | Facility: CLINIC | Age: 44
End: 2024-12-16
Payer: COMMERCIAL

## 2024-12-16 VITALS — WEIGHT: 138 LBS | BODY MASS INDEX: 23.56 KG/M2 | HEIGHT: 64 IN

## 2024-12-16 DIAGNOSIS — Z86.39 HISTORY OF MORBID OBESITY: ICD-10-CM

## 2024-12-16 DIAGNOSIS — Z98.84 S/P LAPAROSCOPIC SLEEVE GASTRECTOMY: ICD-10-CM

## 2024-12-16 ASSESSMENT — PAIN SCALES - GENERAL: PAINLEVEL_OUTOF10: NO PAIN (0)

## 2024-12-16 NOTE — LETTER
2024       RE: Aga Alanis  [address] [place] [address]     Dear Colleague,    Thank you for referring your patient, Aga Alanis, to the [facility]NAG[facility]. Please see a copy of my visit note below.    Virtual Visit Details    Type of service:  Video Visit   Video Start Time:  11:10AM  Video End Time: 11:25Am    Originating Location (pt. Location): Home    Distant Location (provider location):  Off-site  Platform used for Video Visit: [facility] Bariatric Surgery Note    RE: Aga Alanis  MR#: [id number]  : 1980  VISIT DATE: Dec 16, 2024      Dear Michael Abbott,    I had the pleasure of seeing your patient, Aga Alanis, in my post-bariatric surgery assessment clinic.    Assessment & Plan  Problem List Items Addressed This Visit       History of morbid obesity    Relevant Medications    tirzepatide-Weight Management (ZEPBOUND) 2.5 MG/0.5ML prefilled pen    S/P laparoscopic sleeve gastrectomy    Relevant Medications    tirzepatide-Weight Management (ZEPBOUND) 2.5 MG/0.5ML prefilled pen      Continue Zepbound 2.5mg once weekly. Refills sent   Continue focusing on protein  Trinh Davidson CNP in 3 months     22 minutes spent by me on the date of the encounter doing chart review, history and exam, documentation and further activities per the note    CHIEF COMPLAINT: Post-bariatric surgery follow-up.     HISTORY OF PRESENT ILLNESS:      2024    10:19 AM   Questions Regarding Prior Weight Loss Surgery Reviewed With Patient   I had the following weight loss procedure Sleeve Gastrectomy   What year was your surgery?    How has your weight changed since your last visit? I have lost weight   Do you currently have any of the following None of the above   Do you have any concerns today? My  zepbound penntoday didnt work. The fluid ran down my leg and it didnt perez  my skin     NBS with Zuri Dodd PA-C 2/13/2019 BMI 37.89 with PCOS, sleep apnea, and joint pain.   S/p Laparoscopic sleeve gastrectomy with Dr. Dee 4/26/2021. Has not been seen in our clinic since 5/25/2021 (1 mo postop).   Well controlled Reji weight of 127lb. Weight regain through menopause and starting testosterone and progesterone    Last seen by Trinh Davidson CNP on 10/15/2024 and started Zepbound    Anti-obesity medications:     Current:   Zepbound 2.5mg - the start has been going really well. After the first 3 days after injection will have to really be conscious of eating, but appetite will come by the end of the week. No side effects. Has been really helpful, especially with portion control.     Failed/contraindicated:   Phentermine - was on 8mg prior to surgery and was helpful. Tried 30mg and 15mg with OB and had side effects  Topiramate - side effects    Recent diet changes: Eating 3 meals a day. Portion sizes around 1/2 cup. At the beginning of the week has to remind herself to eat, but no food aversion. Minimal snacking. Drinking 120oz of water daily. Protein focused - always eats protein first   Breakfast - egg bites + turkey   Lunch -   Dinner - grilled       Recent exercise/activity changes: Walking. Starting Yoga next week. Active with grandchildren.     Recent stressors: decrease in stress.     Recent sleep changes: no concerns     Vitamins/Labs: working with a functional medicine doctor for vitamins.       Weight History:      12/16/2024    10:19 AM   --   What is your highest lifetime weight? 245   What is your lowest weight since surgery? (In pounds) 124     Initial Weight (lbs): 215 lbs  Weight: 62.6 kg (138 lb)  Last Visits Weight: 64.4 kg (142 lb)  Cumulative weight loss (lbs): 77  Weight Loss Percentage: 35.81%    Wt Readings from Last 5 Encounters:   12/16/24 62.6 kg (138 lb)   10/15/24 64.4 kg (142 lb)   05/31/24 63.8 kg (140 lb 10.5 oz)   05/24/24 58.1 kg (128 lb)    03/06/24 57.2 kg (126 lb)             12/16/2024    10:19 AM   Questions Regarding Co-Morbidities and Health Concerns Reviewed With Patient   Pre-diabetes Never   Diabetes II Never   High Blood Pressure Never   High cholesterol Never   Heartburn/Reflux Gone away   Sleep apnea Gone away   PCOS Improved   Back pain Improved   Joint pain Improved   Lower leg swelling Improved           12/16/2024    10:19 AM   Eating Habits   How many meals do you eat per day? 3   Do you snack between meals? Sometimes   How much food are you eating at each meal? 1/2 cup to 1 cup   Are you able to separate your meals and liquids by at least 30 minutes? Sometimes   Are you able to avoid liquid calories? Yes           12/16/2024    10:19 AM   Exercise Questions Reviewed With Patient   How often do you exercise? Less than 1 time per week   What is the duration of your exercise (in minutes)? 15 Minutes   What types of exercise do you do? walking   What keeps you from being more active? Lack of Time       Social History:      12/16/2024    10:19 AM   --   Are you smoking? No   Are you drinking alcohol? Yes   How much alcohol? Occasionally       Medications:  Current Outpatient Medications   Medication Sig Dispense Refill     tirzepatide-Weight Management (ZEPBOUND) 2.5 MG/0.5ML prefilled pen Inject 0.5 mLs (2.5 mg) subcutaneously every 7 days. 6 mL 1     ALPRAZolam (XANAX) 0.25 MG tablet Take 0.25 mg by mouth nightly as needed for anxiety       Biotin 10 MG TABS tablet Take 10 mg by mouth every morning        busPIRone (BUSPAR) 15 MG tablet Take 15 mg by mouth 2 times daily       lactobacillus rhamnosus, GG, (CULTURELL) capsule Take 1 capsule by mouth every morning        lamoTRIgine (LAMICTAL) 100 MG tablet        spironolactone (ALDACTONE) 100 MG tablet Take 100 mg by mouth every morning        TraZODone & Diet Manage Prod (TRAZAMINE PO)        traZODone (DESYREL) 50 MG tablet TAKE 1/2 TO 1 TAB BY MOUTH EVERY NIGHT AS NEEDED INSOMNIA        UNABLE TO FIND MEDICATION NAME: **PROGEST-VERSA 80MG/GM       UNABLE TO FIND MEDICATION NAME: testosterone cream       No current facility-administered medications for this visit.         12/16/2024    10:19 AM   --   Do you avoid NSAIDs such as (Ibuprofen, Aleve, Naproxen, Advil)? Yes       ROS:  GI:       12/16/2024    10:19 AM   --   Vomiting No   Diarrhea No   Constipation Yes   Swallowing trouble No   Abdominal pain No   Heartburn No     Skin:       12/16/2024    10:19 AM   BAR RBS ROS - SKIN   Rash in skin folds No     Psych:       12/16/2024    10:19 AM   --   Depression Yes   Anxiety Yes     Female Only:       12/16/2024    10:19 AM   BAR RBS ROS -    Female only Polycystic ovarian syndrome (PCOS)   Stress urinary incontinence No           Objective     Vitals - Patient Reported  Pain Score: No Pain (0)      Vitals:  No vitals were obtained today due to virtual visit.        Physical Exam   GENERAL: alert and no distress  EYES: Eyes grossly normal to inspection.  No discharge or erythema, or obvious scleral/conjunctival abnormalities.  RESP: No audible wheeze, cough, or visible cyanosis.    SKIN: Visible skin clear. No significant rash, abnormal pigmentation or lesions.  NEURO: Cranial nerves grossly intact.  Mentation and speech appropriate for age.  PSYCH: Appropriate affect, tone, and pace of words        Sincerely,    Mya Weinberg PA-C        Again, thank you for allowing me to participate in the care of your patient.      Sincerely,    Mya Weinberg PA-C

## 2024-12-16 NOTE — PATIENT INSTRUCTIONS
Patient has severe COPD with hyperinflation and has been interested in bronchoscopic lung volume reduction.  Unfortunately, recent CT analysis shows low emphysema scores throughout both lungs, therefore no target for valves.  We will try Trelegy Ellipta in place of Breztri.  I provided her with a sample and send a prescription to the pharmacy.  In the event that it is not as effective and/or cost prohibitive, she can resume Breztri.  He has also been taking azithromycin once daily and plans to do so for the next month or so and then we will try stopping during the winter months when she typically has less symptoms.   Visit Plan:   Continue Zepbound 2.5mg once weekly. Refills sent   Continue focusing on protein  Trinh Davidson CNP in 3 months

## 2024-12-16 NOTE — PROGRESS NOTES
Virtual Visit Details    Type of service:  Video Visit   Video Start Time:  11:10AM  Video End Time: 11:25Am    Originating Location (pt. Location): Home    Distant Location (provider location):  Off-site  Platform used for Video Visit: [facility] Bariatric Surgery Note    RE: Aga Alanis  MR#: [id number]  : 1980  VISIT DATE: Dec 16, 2024      Dear Michael Abbott,    I had the pleasure of seeing your patient, Aga Alanis, in my post-bariatric surgery assessment clinic.    Assessment & Plan   Problem List Items Addressed This Visit       History of morbid obesity    Relevant Medications    tirzepatide-Weight Management (ZEPBOUND) 2.5 MG/0.5ML prefilled pen    S/P laparoscopic sleeve gastrectomy    Relevant Medications    tirzepatide-Weight Management (ZEPBOUND) 2.5 MG/0.5ML prefilled pen      Continue Zepbound 2.5mg once weekly. Refills sent   Continue focusing on protein  Trinh Davidson CNP in 3 months     22 minutes spent by me on the date of the encounter doing chart review, history and exam, documentation and further activities per the note    CHIEF COMPLAINT: Post-bariatric surgery follow-up.     HISTORY OF PRESENT ILLNESS:      2024    10:19 AM   Questions Regarding Prior Weight Loss Surgery Reviewed With Patient   I had the following weight loss procedure Sleeve Gastrectomy   What year was your surgery?    How has your weight changed since your last visit? I have lost weight   Do you currently have any of the following None of the above   Do you have any concerns today? My  zepbound penntoday didnt work. The fluid ran down my leg and it didnt perez my skin     NBS with Zuri Dodd PA-C 2019 BMI 37.89 with PCOS, sleep apnea, and joint pain.   S/p Laparoscopic sleeve gastrectomy with Dr. Dee 2021. Has not been seen in our clinic since 2021 (1 mo postop).   Well controlled Reji weight of 127lb. Weight regain through menopause and starting testosterone and  progesterone    Last seen by Trnih Davidson CNP on 10/15/2024 and started Zepbound    Anti-obesity medications:     Current:   Zepbound 2.5mg - the start has been going really well. After the first 3 days after injection will have to really be conscious of eating, but appetite will come by the end of the week. No side effects. Has been really helpful, especially with portion control.     Failed/contraindicated:   Phentermine - was on 8mg prior to surgery and was helpful. Tried 30mg and 15mg with OB and had side effects  Topiramate - side effects    Recent diet changes: Eating 3 meals a day. Portion sizes around 1/2 cup. At the beginning of the week has to remind herself to eat, but no food aversion. Minimal snacking. Drinking 120oz of water daily. Protein focused - always eats protein first   Breakfast - egg bites + turkey   Lunch -   Dinner - grilled       Recent exercise/activity changes: Walking. Starting Yoga next week. Active with grandchildren.     Recent stressors: decrease in stress.     Recent sleep changes: no concerns     Vitamins/Labs: working with a functional medicine doctor for vitamins.       Weight History:      12/16/2024    10:19 AM   --   What is your highest lifetime weight? 245   What is your lowest weight since surgery? (In pounds) 124     Initial Weight (lbs): 215 lbs  Weight: 62.6 kg (138 lb)  Last Visits Weight: 64.4 kg (142 lb)  Cumulative weight loss (lbs): 77  Weight Loss Percentage: 35.81%    Wt Readings from Last 5 Encounters:   12/16/24 62.6 kg (138 lb)   10/15/24 64.4 kg (142 lb)   05/31/24 63.8 kg (140 lb 10.5 oz)   05/24/24 58.1 kg (128 lb)   03/06/24 57.2 kg (126 lb)             12/16/2024    10:19 AM   Questions Regarding Co-Morbidities and Health Concerns Reviewed With Patient   Pre-diabetes Never   Diabetes II Never   High Blood Pressure Never   High cholesterol Never   Heartburn/Reflux Gone away   Sleep apnea Gone away   PCOS Improved   Back pain Improved   Joint  pain Improved   Lower leg swelling Improved           12/16/2024    10:19 AM   Eating Habits   How many meals do you eat per day? 3   Do you snack between meals? Sometimes   How much food are you eating at each meal? 1/2 cup to 1 cup   Are you able to separate your meals and liquids by at least 30 minutes? Sometimes   Are you able to avoid liquid calories? Yes           12/16/2024    10:19 AM   Exercise Questions Reviewed With Patient   How often do you exercise? Less than 1 time per week   What is the duration of your exercise (in minutes)? 15 Minutes   What types of exercise do you do? walking   What keeps you from being more active? Lack of Time       Social History:      12/16/2024    10:19 AM   --   Are you smoking? No   Are you drinking alcohol? Yes   How much alcohol? Occasionally       Medications:  Current Outpatient Medications   Medication Sig Dispense Refill    tirzepatide-Weight Management (ZEPBOUND) 2.5 MG/0.5ML prefilled pen Inject 0.5 mLs (2.5 mg) subcutaneously every 7 days. 6 mL 1    ALPRAZolam (XANAX) 0.25 MG tablet Take 0.25 mg by mouth nightly as needed for anxiety      Biotin 10 MG TABS tablet Take 10 mg by mouth every morning       busPIRone (BUSPAR) 15 MG tablet Take 15 mg by mouth 2 times daily      lactobacillus rhamnosus, GG, (CULTURELL) capsule Take 1 capsule by mouth every morning       lamoTRIgine (LAMICTAL) 100 MG tablet       spironolactone (ALDACTONE) 100 MG tablet Take 100 mg by mouth every morning       TraZODone & Diet Manage Prod (TRAZAMINE PO)       traZODone (DESYREL) 50 MG tablet TAKE 1/2 TO 1 TAB BY MOUTH EVERY NIGHT AS NEEDED INSOMNIA      UNABLE TO FIND MEDICATION NAME: **PROGEST-VERSA 80MG/GM      UNABLE TO FIND MEDICATION NAME: testosterone cream       No current facility-administered medications for this visit.         12/16/2024    10:19 AM   --   Do you avoid NSAIDs such as (Ibuprofen, Aleve, Naproxen, Advil)? Yes       ROS:  GI:       12/16/2024    10:19 AM   --    Vomiting No   Diarrhea No   Constipation Yes   Swallowing trouble No   Abdominal pain No   Heartburn No     Skin:       12/16/2024    10:19 AM   BAR RBS ROS - SKIN   Rash in skin folds No     Psych:       12/16/2024    10:19 AM   --   Depression Yes   Anxiety Yes     Female Only:       12/16/2024    10:19 AM   BAR RBS ROS -    Female only Polycystic ovarian syndrome (PCOS)   Stress urinary incontinence No           Objective      Vitals - Patient Reported  Pain Score: No Pain (0)      Vitals:  No vitals were obtained today due to virtual visit.        Physical Exam   GENERAL: alert and no distress  EYES: Eyes grossly normal to inspection.  No discharge or erythema, or obvious scleral/conjunctival abnormalities.  RESP: No audible wheeze, cough, or visible cyanosis.    SKIN: Visible skin clear. No significant rash, abnormal pigmentation or lesions.  NEURO: Cranial nerves grossly intact.  Mentation and speech appropriate for age.  PSYCH: Appropriate affect, tone, and pace of words        Sincerely,    Mya Weinberg PA-C

## 2024-12-16 NOTE — NURSING NOTE
[facility] [address] [address]  [facility] [address]    Is the patient currently in the state of [place]? YES    Visit mode:VIDEO    If the visit is dropped, the patient can be reconnected by:VIDEO VISIT: Text to cell phone:   Telephone Information:   Mobile [phone]       Will anyone else be joining the visit? NO  (If patient encounters technical issues they should call [phone])    Are changes needed to the allergy or medication list? No    Are refills needed on medications prescribed by this physician? Discuss with provider -- would like a refill on the zepbound, per pt the injection this morning did not go thru, fluid dripped down leg after injection in thigh    Rooming Documentation:  Questionnaire(s) completed    Reason for visit: RECHECK    Kamila CASTILLOF

## 2024-12-27 ENCOUNTER — HOSPITAL ENCOUNTER (OUTPATIENT)
Dept: ULTRASOUND IMAGING | Facility: CLINIC | Age: 44
Discharge: HOME | End: 2024-12-27
Attending: UROLOGY | Admitting: UROLOGY
Payer: COMMERCIAL

## 2024-12-27 DIAGNOSIS — N28.89 URETEROCELE: ICD-10-CM

## 2024-12-27 PROCEDURE — 76770 US EXAM ABDO BACK WALL COMP: CPT

## 2024-12-31 ENCOUNTER — HOSPITAL ENCOUNTER (OUTPATIENT)
Dept: MAMMOGRAPHY | Facility: CLINIC | Age: 44
Discharge: HOME | End: 2024-12-31
Attending: FAMILY MEDICINE
Payer: COMMERCIAL

## 2024-12-31 DIAGNOSIS — Z12.31 VISIT FOR SCREENING MAMMOGRAM: ICD-10-CM

## 2024-12-31 PROCEDURE — 77063 BREAST TOMOSYNTHESIS BI: CPT

## 2025-01-03 ENCOUNTER — LAB (OUTPATIENT)
Dept: LAB | Facility: CLINIC | Age: 45
End: 2025-01-03
Payer: COMMERCIAL

## 2025-01-03 DIAGNOSIS — N39.0 RECURRENT UTI: ICD-10-CM

## 2025-01-03 LAB
ALBUMIN UR-MCNC: NEGATIVE MG/DL
APPEARANCE UR: CLEAR
BILIRUB UR QL STRIP: NEGATIVE
COLOR UR AUTO: YELLOW
GLUCOSE UR STRIP-MCNC: NEGATIVE MG/DL
HGB UR QL STRIP: NEGATIVE
KETONES UR STRIP-MCNC: NEGATIVE MG/DL
LEUKOCYTE ESTERASE UR QL STRIP: ABNORMAL
NITRATE UR QL: NEGATIVE
PH UR STRIP: 7 [PH] (ref 5–7)
RBC #/AREA URNS AUTO: ABNORMAL /HPF
SP GR UR STRIP: 1.01 (ref 1–1.03)
UROBILINOGEN UR STRIP-ACNC: 0.2 E.U./DL
WBC #/AREA URNS AUTO: ABNORMAL /HPF

## 2025-01-03 PROCEDURE — 81001 URINALYSIS AUTO W/SCOPE: CPT

## 2025-01-06 DIAGNOSIS — N39.0 RECURRENT UTI: Primary | ICD-10-CM

## 2025-01-06 RX ORDER — FLUCONAZOLE 150 MG/1
150 TABLET ORAL ONCE
Qty: 1 TABLET | Refills: 0 | Status: SHIPPED | OUTPATIENT
Start: 2025-01-06 | End: 2025-01-06

## 2025-01-15 ENCOUNTER — VIRTUAL VISIT (OUTPATIENT)
Dept: UROLOGY | Facility: CLINIC | Age: 45
End: 2025-01-15
Payer: COMMERCIAL

## 2025-01-15 DIAGNOSIS — N28.89 URETEROCELE: Primary | ICD-10-CM

## 2025-01-15 RX ORDER — FLUOXETINE 10 MG/1
CAPSULE ORAL
COMMUNITY
Start: 2024-10-27

## 2025-01-15 NOTE — LETTER
"1/15/2025       RE: Aga Alanis  [address] [place] [address]     Dear Colleague,    Thank you for referring your patient, Aga Alanis, to the [facility] [place] at [facility]. Please see a copy of my visit note below.    Virtual Visit Details    Type of service:  Video Visit   Video Start Time: 230p  Video End Time:245p    Originating Location (pt. 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  We did excision of ureterocele 5/24/24.  Postoperative, she experienced intermittent gross hematuria which has now resolved.    She feels well.  Flank pain is gone.    She does note that she did UTI symptoms about 2-3 weeks ago.  Went in for a UA which was negative.  However, given her symptoms she took a week of macrobid and it resolved her symptoms.  She does have some \"pinching\" sensation which she went to GYN and they recommend pelvic floor PT  She had a renal US 12/27/2024 which shows no hydronephrosis and no ureteroceles.    Exam:           Constitutional - No apparent distress. Patient of stated age.               Eyes - no redness or discharge.              Respiratory - no cough. no labored breathing              Musculoskeletal - full range of motion in all extremities              Skin - no visible skin discoloration or lesions              Neurological - no tremors              Psychiatric - no anxiety, alert & oriented                A/P:  Bilateral ureteroceles s/p excision of ureteroceles.  I reviewed renal US today. It looks great.  Patient's symptoms also better.  She did get 1 UTI but this is WNL. UA was negative.  I discussed she could do pelvic floor PT which can be helpful for nagging symptoms.  I discussed she can stop getting " surveillance US given no flank symptoms and normal Cr.  If she continues to get UTIs, she could consider other workup like repeat cysto or UDS.  Otherwise she can follow with her PCP or GYN.    Aguilar Stoddard MD      Again, thank you for allowing me to participate in the care of your patient.      Sincerely,    Aguilar Stoddard MD

## 2025-01-15 NOTE — NURSING NOTE
Current patient location:  [place]     Is the patient currently in the state [facility]? YES    Visit mode: VIDEO    If the visit is dropped, the patient can be reconnected by:VIDEO VISIT: Text to cell phone:   Telephone Information:   Mobile [phone]       Will anyone else be joining the visit? NO  (If patient encounters technical issues they should call [phone]    Are changes needed to the allergy or medication list? Yes reconciled     Are refills needed on medications prescribed by this physician? Discuss with provider- unsure    Rooming Documentation:  Not applicable    Reason for visit: RECHECK     Unable to complete rooming- provider joined video before completion of rooming.     Trinh CASTILLOF

## 2025-01-15 NOTE — PROGRESS NOTES
"Virtual Visit Details    Type of service:  Video Visit   Video Start Time: 230p  Video End Time:245p    Originating Location (pt. 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  We did excision of ureterocele 5/24/24.  Postoperative, she experienced intermittent gross hematuria which has now resolved.    She feels well.  Flank pain is gone.    She does note that she did UTI symptoms about 2-3 weeks ago.  Went in for a UA which was negative.  However, given her symptoms she took a week of macrobid and it resolved her symptoms.  She does have some \"pinching\" sensation which she went to GYN and they recommend pelvic floor PT  She had a renal US 12/27/2024 which shows no hydronephrosis and no ureteroceles.    Exam:           Constitutional - No apparent distress. Patient of stated age.               Eyes - no redness or discharge.              Respiratory - no cough. no labored breathing              Musculoskeletal - full range of motion in all extremities              Skin - no visible skin discoloration or lesions              Neurological - no tremors              Psychiatric - no anxiety, alert & oriented                A/P:  Bilateral ureteroceles s/p excision of ureteroceles.  I reviewed renal US today. It looks great.  Patient's symptoms also better.  She did get 1 UTI but this is WNL. UA was negative.  I discussed she could do pelvic floor PT which can be helpful for nagging symptoms.  I discussed she can stop getting surveillance US given no flank symptoms and normal Cr.  If she continues to get UTIs, she could consider other workup like repeat cysto or UDS.  Otherwise she can follow with her PCP or GYN.    Aguilar Stoddard MD    "

## 2025-01-16 ENCOUNTER — PRE VISIT (OUTPATIENT)
Dept: ENDOCRINOLOGY | Facility: CLINIC | Age: 45
End: 2025-01-16

## 2025-01-16 ENCOUNTER — VIRTUAL VISIT (OUTPATIENT)
Dept: ENDOCRINOLOGY | Facility: CLINIC | Age: 45
End: 2025-01-16
Payer: COMMERCIAL

## 2025-01-16 VITALS — BODY MASS INDEX: 23.73 KG/M2 | WEIGHT: 139 LBS | HEIGHT: 64 IN

## 2025-01-16 DIAGNOSIS — Z86.39 HISTORY OF MORBID OBESITY: Primary | ICD-10-CM

## 2025-01-16 ASSESSMENT — PAIN SCALES - GENERAL: PAINLEVEL_OUTOF10: NO PAIN (0)

## 2025-01-16 NOTE — PROGRESS NOTES
Virtual Visit Details    Type of service:  Video Visit     Originating Location (pt. Location): Home    Distant Location (provider location):  On-site  Platform used for Video Visit: Julio Cesar

## 2025-01-16 NOTE — NURSING NOTE
Is the patient currently in the state of [place]? YES    Visit mode:VIDEO    If the visit is dropped, the patient can be reconnected by: VIDEO VISIT: Text to cell phone:   Telephone Information:   Mobile [phone]       Will anyone else be joining the visit? NO  (If patient encounters technical issues they should call [phone]    How would you like to obtain your AVS? MyChart    Are changes needed to the allergy or medication list? No    Are refills needed on medications prescribed by this physician? NO    Reason for visit: Consult    Shoshana ACMPOS

## 2025-01-16 NOTE — PROGRESS NOTES
Return Bariatric Surgery Note    RE: Aga Alanis  MR#: [id number]  : 1980  VISIT DATE: 2025      Dear Michael Abbott,    I had the pleasure of seeing your patient, Aga Alanis, in my post-bariatric surgery assessment clinic.    Assessment & Plan   Problem List Items Addressed This Visit    None       NBS with Zuri Dodd PA-C 2019 BMI 37.89 with PCOS, sleep apnea, and joint pain.  S/p Laparoscopic sleeve gastrectomy with Dr. Dee 2021  No follow up since 1 mo postop until seen by Trinh Oct 2024 and Mya Cordero Dec 2024  Taking Zepbound 2.5mg since 3 mo ago and not feeling as effective.  Lost from 142 to 139 since starting Zepbound    Plan:  Increase Zepbound to 5mg weekly  MyChart check in 6 weeks  Zuri/Trinh/Mya Cordero in 3-4 mo.     10 minutes spent by me on the date of the encounter doing chart review, history and exam, documentation and further activities per the note    CHIEF COMPLAINT: Post-bariatric surgery follow-up.    HISTORY OF PRESENT ILLNESS:      2025    10:26 PM   Questions Regarding Prior Weight Loss Surgery Reviewed With Patient   I had the following weight loss procedure Sleeve Gastrectomy   What year was your surgery?    How has your weight changed since your last visit? I have stayed about the same   Do you currently have any of the following Sleep Apnea   Do you have any concerns today? Possibky increasing dose of zepbound. It worked great at first but doesnt aeemTo be working for appetite control. Since srarting HRT for perimenopause my appetite has gone crazy and caused me to regain weight i had been keeping off         Anti-obesity medications:     Current:   Zepbound 2.5mg    Failed/contraindicated:   Topiramate- nightmares   Phentermine- increased anxiety/ depression - panic attacks daily (18.75mg)       Weight History:      2025    10:26 PM   --   What is your highest lifetime weight? 235   What is your lowest weight since surgery? (In  pounds) 115     Initial Weight (lbs): 215 lbs  Weight: 63 kg (139 lb)  Last Visits Weight: 62.6 kg (138 lb)  Cumulative weight loss (lbs): 76  Weight Loss Percentage: 35.35%        1/14/2025    10:26 PM   Questions Regarding Co-Morbidities and Health Concerns Reviewed With Patient   Pre-diabetes Never   Diabetes II Never   High Blood Pressure Never   High cholesterol Gone away   Heartburn/Reflux Gone away   Sleep apnea Improved   PCOS Improved   Back pain Gone away   Joint pain Improved   Lower leg swelling Gone away           1/14/2025    10:26 PM   Eating Habits   How many meals do you eat per day? 3   Do you snack between meals? Yes   How much food are you eating at each meal? 1/2 cup to 1 cup   Are you able to separate your meals and liquids by at least 30 minutes? Yes   Are you able to avoid liquid calories? Sometimes           1/14/2025    10:26 PM   Exercise Questions Reviewed With Patient   How often do you exercise? Never   What keeps you from being more active? Lack of Time       Social History:      1/14/2025    10:26 PM   --   Are you smoking? No   Are you drinking alcohol? Yes   How much alcohol? 1-2 a week maybe. Very occasionally       Medications:  Current Outpatient Medications   Medication Sig Dispense Refill    ALPRAZolam (XANAX) 0.25 MG tablet Take 0.25 mg by mouth nightly as needed for anxiety      Biotin 10 MG TABS tablet Take 10 mg by mouth every morning       busPIRone (BUSPAR) 15 MG tablet Take 15 mg by mouth 2 times daily      FLUoxetine (PROZAC) 10 MG capsule       lactobacillus rhamnosus, GG, (CULTURELL) capsule Take 1 capsule by mouth every morning       lamoTRIgine (LAMICTAL) 100 MG tablet       spironolactone (ALDACTONE) 100 MG tablet Take 100 mg by mouth every morning       UNABLE TO FIND MEDICATION NAME: **PROGEST-VERSA 80MG/GM      UNABLE TO FIND MEDICATION NAME: testosterone cream       No current facility-administered medications for this visit.         1/14/2025    10:26 PM   --  "  Do you avoid NSAIDs such as (Ibuprofen, Aleve, Naproxen, Advil)? Yes       ROS:  GI:       1/14/2025    10:26 PM   --   Vomiting No   Diarrhea No   Constipation Yes   Swallowing trouble No   Abdominal pain Yes   Heartburn No     Skin:       1/14/2025    10:26 PM   BAR RBS ROS - SKIN   Rash in skin folds No     Psych:       1/14/2025    10:26 PM   --   Depression No   Anxiety No     Female Only:       1/14/2025    10:26 PM   BAR RBS ROS -    Female only Polycystic ovarian syndrome (PCOS)    Post-menopausal   Stress urinary incontinence No       Lab on 01/03/2025   Component Date Value Ref Range Status    Color Urine 01/03/2025 Yellow  Colorless, Straw, Light Yellow, Yellow Final    Appearance Urine 01/03/2025 Clear  Clear Final    Glucose Urine 01/03/2025 Negative  Negative mg/dL Final    Bilirubin Urine 01/03/2025 Negative  Negative Final    Ketones Urine 01/03/2025 Negative  Negative mg/dL Final    Specific Gravity Urine 01/03/2025 1.015  1.003 - 1.035 Final    Blood Urine 01/03/2025 Negative  Negative Final    pH Urine 01/03/2025 7.0  5.0 - 7.0 Final    Protein Albumin Urine 01/03/2025 Negative  Negative mg/dL Final    Urobilinogen Urine 01/03/2025 0.2  0.2, 1.0 E.U./dL Final    Nitrite Urine 01/03/2025 Negative  Negative Final    Leukocyte Esterase Urine 01/03/2025 Trace (A)  Negative Final    RBC Urine 01/03/2025 0-2  0-2 /HPF /HPF Final    WBC Urine 01/03/2025 5-10 (A)  0-5 /HPF /HPF Final           1/14/2025    10:20 PM   KYLE Score (Last Two)   KYLE Raw Score 37    Activation Score 79.2    KYLE Level 4        Patient-reported         PHYSICAL EXAM:  Objective    Ht 1.626 m (5' 4\")   Wt 63 kg (139 lb)   LMP 06/05/2017   BMI 23.86 kg/m    Vitals - Patient Reported  Pain Score: No Pain (0)        Physical Exam   GENERAL: alert and no distress  EYES: Eyes grossly normal to inspection.  No discharge or erythema, or obvious scleral/conjunctival abnormalities.  RESP: No audible wheeze, cough, or visible " cyanosis.    SKIN: Visible skin clear. No significant rash, abnormal pigmentation or lesions.  NEURO: Cranial nerves grossly intact.  Mentation and speech appropriate for age.  PSYCH: Appropriate affect, tone, and pace of words        Sincerely,    Zuri Dodd PA-C

## 2025-01-16 NOTE — LETTER
2025       RE: Aga Alanis  [address] [place] [address]     Dear Colleague,    Thank you for referring your patient, Aga Alanis, to the [facility]NA[facility] [place] at [facility]. Please see a copy of my visit note below.    Return Bariatric Surgery Note    RE: Aga Alanis  MR#: [id number]  : 1980  VISIT DATE: 2025      Dear Michael Abbott,    I had the pleasure of seeing your patient, Aga Alanis, in my post-bariatric surgery assessment clinic.    Assessment & Plan  Problem List Items Addressed This Visit    None       NBS with Zuri Dodd PA-C 2019 BMI 37.89 with PCOS, sleep apnea, and joint pain.  S/p Laparoscopic sleeve gastrectomy with Dr. Dee 2021  No follow up since 1 mo postop until seen by Trinh Oct 2024 and Mya Cordero Dec 2024  Taking Zepbound 2.5mg since 3 mo ago and not feeling as effective.  Lost from 142 to 139 since starting Zepbound    Plan:  Increase Zepbound to 5mg weekly  MyChart check in 6 weeks  Zuri/Trinh/Mya Cordero in 3-4 mo.     10 minutes spent by me on the date of the encounter doing chart review, history and exam, documentation and further activities per the note    CHIEF COMPLAINT: Post-bariatric surgery follow-up.    HISTORY OF PRESENT ILLNESS:      2025    10:26 PM   Questions Regarding Prior Weight Loss Surgery Reviewed With Patient   I had the following weight loss procedure Sleeve Gastrectomy   What year was your surgery?    How has your weight changed since your last visit? I have stayed about the same   Do you currently have any of the following Sleep Apnea   Do you have any concerns today? Possibky increasing dose of zepbound. It worked great at first but doesnt aeemTo be working for appetite control. Since srarting HRT for perimenopause my appetite has gone crazy and caused me to regain weight i had been keeping off          Anti-obesity medications:     Current:   Zepbound 2.5mg    Failed/contraindicated:   Topiramate- nightmares   Phentermine- increased anxiety/ depression - panic attacks daily (18.75mg)       Weight History:      1/14/2025    10:26 PM   --   What is your highest lifetime weight? 235   What is your lowest weight since surgery? (In pounds) 115     Initial Weight (lbs): 215 lbs  Weight: 63 kg (139 lb)  Last Visits Weight: 62.6 kg (138 lb)  Cumulative weight loss (lbs): 76  Weight Loss Percentage: 35.35%        1/14/2025    10:26 PM   Questions Regarding Co-Morbidities and Health Concerns Reviewed With Patient   Pre-diabetes Never   Diabetes II Never   High Blood Pressure Never   High cholesterol Gone away   Heartburn/Reflux Gone away   Sleep apnea Improved   PCOS Improved   Back pain Gone away   Joint pain Improved   Lower leg swelling Gone away           1/14/2025    10:26 PM   Eating Habits   How many meals do you eat per day? 3   Do you snack between meals? Yes   How much food are you eating at each meal? 1/2 cup to 1 cup   Are you able to separate your meals and liquids by at least 30 minutes? Yes   Are you able to avoid liquid calories? Sometimes           1/14/2025    10:26 PM   Exercise Questions Reviewed With Patient   How often do you exercise? Never   What keeps you from being more active? Lack of Time       Social History:      1/14/2025    10:26 PM   --   Are you smoking? No   Are you drinking alcohol? Yes   How much alcohol? 1-2 a week maybe. Very occasionally       Medications:  Current Outpatient Medications   Medication Sig Dispense Refill     ALPRAZolam (XANAX) 0.25 MG tablet Take 0.25 mg by mouth nightly as needed for anxiety       Biotin 10 MG TABS tablet Take 10 mg by mouth every morning        busPIRone (BUSPAR) 15 MG tablet Take 15 mg by mouth 2 times daily       FLUoxetine (PROZAC) 10 MG capsule        lactobacillus rhamnosus, GG, (CULTURELL) capsule Take 1 capsule by mouth every morning         lamoTRIgine (LAMICTAL) 100 MG tablet        spironolactone (ALDACTONE) 100 MG tablet Take 100 mg by mouth every morning        UNABLE TO FIND MEDICATION NAME: **PROGEST-VERSA 80MG/GM       UNABLE TO FIND MEDICATION NAME: testosterone cream       No current facility-administered medications for this visit.         1/14/2025    10:26 PM   --   Do you avoid NSAIDs such as (Ibuprofen, Aleve, Naproxen, Advil)? Yes       ROS:  GI:       1/14/2025    10:26 PM   --   Vomiting No   Diarrhea No   Constipation Yes   Swallowing trouble No   Abdominal pain Yes   Heartburn No     Skin:       1/14/2025    10:26 PM   BAR RBS ROS - SKIN   Rash in skin folds No     Psych:       1/14/2025    10:26 PM   --   Depression No   Anxiety No     Female Only:       1/14/2025    10:26 PM   BAR RBS ROS -    Female only Polycystic ovarian syndrome (PCOS)    Post-menopausal   Stress urinary incontinence No       Lab on 01/03/2025   Component Date Value Ref Range Status     Color Urine 01/03/2025 Yellow  Colorless, Straw, Light Yellow, Yellow Final     Appearance Urine 01/03/2025 Clear  Clear Final     Glucose Urine 01/03/2025 Negative  Negative mg/dL Final     Bilirubin Urine 01/03/2025 Negative  Negative Final     Ketones Urine 01/03/2025 Negative  Negative mg/dL Final     Specific Gravity Urine 01/03/2025 1.015  1.003 - 1.035 Final     Blood Urine 01/03/2025 Negative  Negative Final     pH Urine 01/03/2025 7.0  5.0 - 7.0 Final     Protein Albumin Urine 01/03/2025 Negative  Negative mg/dL Final     Urobilinogen Urine 01/03/2025 0.2  0.2, 1.0 E.U./dL Final     Nitrite Urine 01/03/2025 Negative  Negative Final     Leukocyte Esterase Urine 01/03/2025 Trace (A)  Negative Final     RBC Urine 01/03/2025 0-2  0-2 /HPF /HPF Final     WBC Urine 01/03/2025 5-10 (A)  0-5 /HPF /HPF Final           1/14/2025    10:20 PM   KYLE Score (Last Two)   KYLE Raw Score 37    Activation Score 79.2    KYLE Level 4        Patient-reported         PHYSICAL  "EXAM:  Objective   Ht 1.626 m (5' 4\")   Wt 63 kg (139 lb)   LMP 06/05/2017   BMI 23.86 kg/m    Vitals - Patient Reported  Pain Score: No Pain (0)        Physical Exam   GENERAL: alert and no distress  EYES: Eyes grossly normal to inspection.  No discharge or erythema, or obvious scleral/conjunctival abnormalities.  RESP: No audible wheeze, cough, or visible cyanosis.    SKIN: Visible skin clear. No significant rash, abnormal pigmentation or lesions.  NEURO: Cranial nerves grossly intact.  Mentation and speech appropriate for age.  PSYCH: Appropriate affect, tone, and pace of words        Sincerely,    Zuri Dodd PA-C      Virtual Visit Details    Type of service:  Video Visit     Originating Location (pt. Location): Home    Distant Location (provider location):  On-site  Platform used for Video Visit: AmWell      Again, thank you for allowing me to participate in the care of your patient.      Sincerely,    Zuri Dodd PA-C    "

## 2025-01-23 ENCOUNTER — TELEPHONE (OUTPATIENT)
Dept: ENDOCRINOLOGY | Facility: CLINIC | Age: 45
End: 2025-01-23
Payer: COMMERCIAL

## 2025-01-23 NOTE — TELEPHONE ENCOUNTER
Zuri/Trinh/Mya Cordero in 3-4 mo. Ok to use new shelton    Patient confirmed scheduled appointment:     Date: 5/7/25  Time: 9:30 AM  Visit type: Return Bariatric  Visit mode: Virtual Visit  Provider:  Zuri Dodd PA-C  Location: [facility]    Additional Notes:   Ok to use new shelton slot per Zuri Dodd

## 2025-01-27 ENCOUNTER — APPOINTMENT (OUTPATIENT)
Dept: CT IMAGING | Facility: CLINIC | Age: 45
End: 2025-01-27
Attending: EMERGENCY MEDICINE
Payer: COMMERCIAL

## 2025-01-27 ENCOUNTER — HOSPITAL ENCOUNTER (EMERGENCY)
Facility: CLINIC | Age: 45
Discharge: HOME | End: 2025-01-28
Attending: EMERGENCY MEDICINE | Admitting: EMERGENCY MEDICINE
Payer: COMMERCIAL

## 2025-01-27 VITALS
WEIGHT: 127.21 LBS | SYSTOLIC BLOOD PRESSURE: 109 MMHG | OXYGEN SATURATION: 98 % | BODY MASS INDEX: 21.83 KG/M2 | HEART RATE: 87 BPM | DIASTOLIC BLOOD PRESSURE: 81 MMHG | TEMPERATURE: 97.8 F | RESPIRATION RATE: 16 BRPM

## 2025-01-27 DIAGNOSIS — N76.1 SUBACUTE VAGINITIS: ICD-10-CM

## 2025-01-27 DIAGNOSIS — R10.9 FLANK PAIN: ICD-10-CM

## 2025-01-27 LAB
ALBUMIN UR-MCNC: NEGATIVE MG/DL
ANION GAP SERPL CALCULATED.3IONS-SCNC: 11 MMOL/L (ref 7–15)
APPEARANCE UR: CLEAR
BASOPHILS # BLD AUTO: 0.1 10E3/UL (ref 0–0.2)
BASOPHILS NFR BLD AUTO: 1 %
BILIRUB UR QL STRIP: NEGATIVE
BUN SERPL-MCNC: 12.1 MG/DL (ref 6–20)
CALCIUM SERPL-MCNC: 9.8 MG/DL (ref 8.8–10.4)
CHLORIDE SERPL-SCNC: 99 MMOL/L (ref 98–107)
COLOR UR AUTO: ABNORMAL
CREAT SERPL-MCNC: 0.82 MG/DL (ref 0.51–0.95)
EGFRCR SERPLBLD CKD-EPI 2021: 89 ML/MIN/1.73M2
EOSINOPHIL # BLD AUTO: 0 10E3/UL (ref 0–0.7)
EOSINOPHIL NFR BLD AUTO: 1 %
ERYTHROCYTE [DISTWIDTH] IN BLOOD BY AUTOMATED COUNT: 12.8 % (ref 10–15)
GLUCOSE SERPL-MCNC: 81 MG/DL (ref 70–99)
GLUCOSE UR STRIP-MCNC: NEGATIVE MG/DL
HCO3 SERPL-SCNC: 25 MMOL/L (ref 22–29)
HCT VFR BLD AUTO: 39.4 % (ref 35–47)
HGB BLD-MCNC: 13.3 G/DL (ref 11.7–15.7)
HGB UR QL STRIP: NEGATIVE
HOLD SPECIMEN: NORMAL
HOLD SPECIMEN: NORMAL
IMM GRANULOCYTES # BLD: 0 10E3/UL
IMM GRANULOCYTES NFR BLD: 0 %
KETONES UR STRIP-MCNC: ABNORMAL MG/DL
LEUKOCYTE ESTERASE UR QL STRIP: NEGATIVE
LYMPHOCYTES # BLD AUTO: 2.1 10E3/UL (ref 0.8–5.3)
LYMPHOCYTES NFR BLD AUTO: 40 %
MCH RBC QN AUTO: 31 PG (ref 26.5–33)
MCHC RBC AUTO-ENTMCNC: 33.8 G/DL (ref 31.5–36.5)
MCV RBC AUTO: 92 FL (ref 78–100)
MONOCYTES # BLD AUTO: 0.4 10E3/UL (ref 0–1.3)
MONOCYTES NFR BLD AUTO: 7 %
MUCOUS THREADS #/AREA URNS LPF: PRESENT /LPF
NEUTROPHILS # BLD AUTO: 2.8 10E3/UL (ref 1.6–8.3)
NEUTROPHILS NFR BLD AUTO: 52 %
NITRATE UR QL: NEGATIVE
NRBC # BLD AUTO: 0 10E3/UL
NRBC BLD AUTO-RTO: 0 /100
PH UR STRIP: 7.5 [PH] (ref 5–7)
PLATELET # BLD AUTO: 211 10E3/UL (ref 150–450)
POTASSIUM SERPL-SCNC: 4.1 MMOL/L (ref 3.4–5.3)
RBC # BLD AUTO: 4.29 10E6/UL (ref 3.8–5.2)
RBC URINE: 1 /HPF
SODIUM SERPL-SCNC: 135 MMOL/L (ref 135–145)
SP GR UR STRIP: 1.01 (ref 1–1.03)
SQUAMOUS EPITHELIAL: 1 /HPF
UROBILINOGEN UR STRIP-MCNC: NORMAL MG/DL
WBC # BLD AUTO: 5.3 10E3/UL (ref 4–11)
WBC URINE: 1 /HPF

## 2025-01-27 PROCEDURE — 250N000011 HC RX IP 250 OP 636: Performed by: EMERGENCY MEDICINE

## 2025-01-27 PROCEDURE — 80048 BASIC METABOLIC PNL TOTAL CA: CPT | Performed by: EMERGENCY MEDICINE

## 2025-01-27 PROCEDURE — 81001 URINALYSIS AUTO W/SCOPE: CPT | Performed by: EMERGENCY MEDICINE

## 2025-01-27 PROCEDURE — 87491 CHLMYD TRACH DNA AMP PROBE: CPT | Performed by: EMERGENCY MEDICINE

## 2025-01-27 PROCEDURE — 74177 CT ABD & PELVIS W/CONTRAST: CPT

## 2025-01-27 PROCEDURE — 250N000009 HC RX 250: Performed by: EMERGENCY MEDICINE

## 2025-01-27 PROCEDURE — 84295 ASSAY OF SERUM SODIUM: CPT | Performed by: EMERGENCY MEDICINE

## 2025-01-27 PROCEDURE — 85025 COMPLETE CBC W/AUTO DIFF WBC: CPT | Performed by: EMERGENCY MEDICINE

## 2025-01-27 PROCEDURE — 87210 SMEAR WET MOUNT SALINE/INK: CPT | Performed by: EMERGENCY MEDICINE

## 2025-01-27 PROCEDURE — 99285 EMERGENCY DEPT VISIT HI MDM: CPT | Mod: 25

## 2025-01-27 PROCEDURE — 96374 THER/PROPH/DIAG INJ IV PUSH: CPT | Mod: 59

## 2025-01-27 PROCEDURE — 36415 COLL VENOUS BLD VENIPUNCTURE: CPT | Performed by: EMERGENCY MEDICINE

## 2025-01-27 RX ORDER — IOPAMIDOL 755 MG/ML
500 INJECTION, SOLUTION INTRAVASCULAR ONCE
Status: COMPLETED | OUTPATIENT
Start: 2025-01-27 | End: 2025-01-27

## 2025-01-27 RX ORDER — KETOROLAC TROMETHAMINE 15 MG/ML
15 INJECTION, SOLUTION INTRAMUSCULAR; INTRAVENOUS ONCE
Status: COMPLETED | OUTPATIENT
Start: 2025-01-27 | End: 2025-01-27

## 2025-01-27 RX ADMIN — KETOROLAC TROMETHAMINE 15 MG: 15 INJECTION, SOLUTION INTRAMUSCULAR; INTRAVENOUS at 22:48

## 2025-01-27 RX ADMIN — SODIUM CHLORIDE 56 ML: 9 INJECTION, SOLUTION INTRAVENOUS at 22:32

## 2025-01-27 RX ADMIN — IOPAMIDOL 64 ML: 755 INJECTION, SOLUTION INTRAVENOUS at 22:32

## 2025-01-27 ASSESSMENT — ACTIVITIES OF DAILY LIVING (ADL): ADLS_ACUITY_SCORE: 47

## 2025-01-27 ASSESSMENT — COLUMBIA-SUICIDE SEVERITY RATING SCALE - C-SSRS
6. HAVE YOU EVER DONE ANYTHING, STARTED TO DO ANYTHING, OR PREPARED TO DO ANYTHING TO END YOUR LIFE?: NO
1. IN THE PAST MONTH, HAVE YOU WISHED YOU WERE DEAD OR WISHED YOU COULD GO TO SLEEP AND NOT WAKE UP?: NO
2. HAVE YOU ACTUALLY HAD ANY THOUGHTS OF KILLING YOURSELF IN THE PAST MONTH?: NO

## 2025-01-27 NOTE — ED TRIAGE NOTES
Pt reports malodorous urine for the past week. Pt states that she has had 3 UAs done in the last week, but they have not been cultured. Pt states that she took macrobid for 10 days which helped her symptoms, but then they returned. Left flank pain started 2 weeks ago and today she is having pain bilaterally. Denies blood in urine. Pt states that she had surgery to repair her ureters last May which puts her at risk for urinary tract issues.

## 2025-01-27 NOTE — Clinical Note
Aga Alanis was seen and treated in our emergency department on 1/27/2025.  She may return to work on 01/29/2025.       If you have any questions or concerns, please don't hesitate to call.      Nenita Simmons, DO

## 2025-01-28 LAB
C TRACH DNA SPEC QL PROBE+SIG AMP: NEGATIVE
CLUE CELLS: NORMAL
N GONORRHOEA DNA SPEC QL NAA+PROBE: NEGATIVE
SPECIMEN TYPE: NORMAL
TRICHOMONAS, WET PREP: NORMAL
WBC'S/HIGH POWER FIELD, WET PREP: NORMAL
YEAST, WET PREP: NORMAL

## 2025-01-28 NOTE — ED PROVIDER NOTES
Emergency Department Note      History of Present Illness     Chief Complaint   No chief complaint on file.      HPI   Aga Alanis is a 44 year old female with a history of nephrolithiasis, recurrent UTI's, and ureterocele who presents to the Emergency Department for flank pain. The patient reports she was on antibiotics, macrobid for malodorous urine from 1/3/25-1/13/25 and currently endorses malodorous urine and flank pain. She then was diagnosed with BV and vaginal cadidiasis and remains on metrogel biweekly.  She has a history of nephrolithiasis that have passed on their own and are typically helped with staying hydrated. Denies fever, chills, shortness of breath, chest pain, abdominal pain, vomiting, or diarrhea.  No significant vaginal bleeding/discharge. She has tried tylenol for pain with minimal relief. Yesterday pain seemed to worsen significantly prompting presentation.  She reports going to an Urgent care yesterday and was told her urine was unremarkable.  No concerns for STIs though states in an open marriage.     Independent Historian   None    Review of External Notes   I reviewed the patient's most recent office visit note from 1/26/25.    Past Medical History     Medical History and Problem List   Anemia  Anxiety   ADD  Depression   DVT  GERD  Hydronephrosis   NNEKA  Ovarian cyst  PCOS  Uterocele, bilateral    Medications   ALPRAZolam  busPIRone   FLUoxetine   lamoTRIgine   Spironolactone     Surgical History   Knee arthroscopy, right  Conization LEEP  Cystoscopy, internal urethrotomy, combined, bilateral  DIAMOND  Gastrectomy sleeve  Laparoscopic herniorrhaphy hiatal  Ovarian cyst removal  Myringotomy with tube placement  Repair vaginal laceration  Bilateral tubal ligation  Breast augmentation    Physical Exam     Patient Vitals for the past 24 hrs:   BP Temp Temp src Pulse Resp SpO2 Weight   01/27/25 1655 -- 97.8  F (36.6  C) Oral 87 16 -- 57.7 kg (127 lb 3.3 oz)   01/27/25 1652 109/81 -- -- --  -- 98 % --     Physical Exam  Nursing note and vitals reviewed.  Constitutional: Well nourished.   Eyes: Conjunctiva normal.  Pupils are equal, round, and reactive to light.   ENT: Nose normal. Mucous membranes pink and moist.    Neck: Normal range of motion.  CVS: Normal rate, regular rhythm.  Normal heart sounds.   Pulmonary: Lungs clear to auscultation bilaterally. No wheezes/rales/rhonchi.  GI: Abdomen soft. Nontender, nondistended. No rigidity or guarding.  Mild bilateral CVA tenderness  Pelvic: Normal external genitalia.  No vaginal bleeding.  Minimal white cervical discharge.  No adnexal tenderness noted.Chaperone EMT Blanche  MSK: Moves all lower extremities equally  Neuro: Alert. Follows simple commands.  Skin: Skin is warm and dry. No rash noted.   Psychiatric: Normal affect.       Diagnostics     Lab Results   Labs Ordered and Resulted from Time of ED Arrival to Time of ED Departure   ROUTINE UA WITH MICROSCOPIC REFLEX TO CULTURE - Abnormal       Result Value    Color Urine Light Yellow      Appearance Urine Clear      Glucose Urine Negative      Bilirubin Urine Negative      Ketones Urine Trace (*)     Specific Gravity Urine 1.011      Blood Urine Negative      pH Urine 7.5 (*)     Protein Albumin Urine Negative      Urobilinogen Urine Normal      Nitrite Urine Negative      Leukocyte Esterase Urine Negative      Mucus Urine Present (*)     RBC Urine 1      WBC Urine 1      Squamous Epithelials Urine 1     BASIC METABOLIC PANEL - Normal    Sodium 135      Potassium 4.1      Chloride 99      Carbon Dioxide (CO2) 25      Anion Gap 11      Urea Nitrogen 12.1      Creatinine 0.82      GFR Estimate 89      Calcium 9.8      Glucose 81     WET PREPARATION - Normal    Trichomonas Absent      Yeast Absent      Clue Cells Absent      WBCs/high power field None     CBC WITH PLATELETS AND DIFFERENTIAL    WBC Count 5.3      RBC Count 4.29      Hemoglobin 13.3      Hematocrit 39.4      MCV 92      MCH 31.0      MCHC  33.8      RDW 12.8      Platelet Count 211      % Neutrophils 52      % Lymphocytes 40      % Monocytes 7      % Eosinophils 1      % Basophils 1      % Immature Granulocytes 0      NRBCs per 100 WBC 0      Absolute Neutrophils 2.8      Absolute Lymphocytes 2.1      Absolute Monocytes 0.4      Absolute Eosinophils 0.0      Absolute Basophils 0.1      Absolute Immature Granulocytes 0.0      Absolute NRBCs 0.0     CHLAMYDIA TRACHOMATIS/NEISSERIA GONORRHOEAE BY PCR       Imaging   CT Abdomen Pelvis w Contrast   Final Result   IMPRESSION:       1.  Thickening and hyperenhancement of the vaginal cuff with trace amount of fluid suspicious for vaginitis.      2.  No other abnormality identified in the abdomen and pelvis.          EKG   None    Independent Interpretation   None    ED Course      Medications Administered   Medications   ketorolac (TORADOL) injection 15 mg (15 mg Intravenous $Given 1/27/25 2248)   iopamidol (ISOVUE-370) solution 500 mL (64 mLs Intravenous $Given 1/27/25 2232)   CT scan flush (56 mLs Intravenous $Given 1/27/25 2232)       Procedures   None     Discussion of Management   None    ED Course   ED Course as of 01/27/25 2352 Mon Jan 27, 2025 2220 I evaluated the patient, obtained history, and performed a physical exam as detailed above.    2333 I rechecked the patient and updated them on the plan of care.    2339 I performed a pelvic exam.           Additional Documentation  None    Medical Decision Making / Diagnosis     CMS Diagnoses: None    MIPS       None    [facility]   Aga Alanis is a 44 year old female presenting with predominately complaints of flank pain and malodorous urine.  She unfortunately had a prolonged wait time secondary to large patient volumes.  Labs overall reassuring without profound anemia or significant electrolyte derangement.  Known history of hysterectomy.  UA without evidence of obvious infection though formal culture sent.  wet prep sent as well though  unremarkable.  Gonorrhea/chlamydia sent as well in light of her symptoms though result pending.  No clinical evidence on exam to suggest PID.  She did undergo formal CT abdomen which is without intraabdominal catastrophe suggest or obstructive uropathy though did show slight thickening of the vaginal cuff with trace amount of fluid suspicion for vaginitis.  Patient is currently on MetroGel I recommended she continue this.  She is aware that gonorrhea/chlamydia results will take up to 48 hours so she will receive a phone call.  I do not feel further antibiotics are warranted at this point in time.  She did report symptom improvement after Toradol.  I did recommend close outpatient PCP as well as urology/Gyn follow-up if symptoms persist.  She is also aware that urine culture is pending at discharge.  Return precautions given.    Disposition   The patient was discharged.     Diagnosis     ICD-10-CM    1. Subacute vaginitis  N76.1       2. Flank pain  R10.9            Discharge Medications   Discharge Medication List as of 1/27/2025 11:54 PM        Scribe Disclosure:  I, Citlali Panda, am serving as a scribe at 10:40 PM on 1/27/2025 to document services personally performed by Nenita Simmons DO based on my observations and the provider's statements to me.        Nenita Simmons DO  01/28/25 0038

## 2025-01-28 NOTE — DISCHARGE INSTRUCTIONS
Recommend close outpatient PCP and urology followup should symptoms persist. Monitor for fever, increasing pain

## 2025-02-13 ENCOUNTER — TELEPHONE (OUTPATIENT)
Dept: UROLOGY | Facility: CLINIC | Age: 45
End: 2025-02-13
Payer: COMMERCIAL

## 2025-03-17 ENCOUNTER — LAB (OUTPATIENT)
Dept: LAB | Facility: CLINIC | Age: 45
End: 2025-03-17
Payer: COMMERCIAL

## 2025-03-17 DIAGNOSIS — N39.0 RECURRENT UTI: ICD-10-CM

## 2025-03-17 LAB
ALBUMIN UR-MCNC: NEGATIVE MG/DL
APPEARANCE UR: CLEAR
BILIRUB UR QL STRIP: NEGATIVE
COLOR UR AUTO: ABNORMAL
GLUCOSE UR STRIP-MCNC: NEGATIVE MG/DL
HGB UR QL STRIP: NEGATIVE
KETONES UR STRIP-MCNC: 40 MG/DL
LEUKOCYTE ESTERASE UR QL STRIP: NEGATIVE
MUCOUS THREADS #/AREA URNS LPF: PRESENT /LPF
NITRATE UR QL: NEGATIVE
PH UR STRIP: 6 [PH] (ref 5–7)
RBC URINE: 1 /HPF
SP GR UR STRIP: 1.03 (ref 1–1.03)
SQUAMOUS EPITHELIAL: 2 /HPF
UROBILINOGEN UR STRIP-MCNC: NORMAL MG/DL
WBC URINE: 1 /HPF

## 2025-03-17 PROCEDURE — 87086 URINE CULTURE/COLONY COUNT: CPT

## 2025-03-17 PROCEDURE — 81001 URINALYSIS AUTO W/SCOPE: CPT

## 2025-03-19 LAB — BACTERIA UR CULT: NO GROWTH

## 2025-03-20 ENCOUNTER — TELEPHONE (OUTPATIENT)
Dept: UROLOGY | Facility: CLINIC | Age: 45
End: 2025-03-20

## 2025-03-20 ENCOUNTER — OFFICE VISIT (OUTPATIENT)
Dept: UROLOGY | Facility: CLINIC | Age: 45
End: 2025-03-20
Payer: COMMERCIAL

## 2025-03-20 VITALS
BODY MASS INDEX: 22.2 KG/M2 | HEART RATE: 82 BPM | HEIGHT: 64 IN | OXYGEN SATURATION: 99 % | DIASTOLIC BLOOD PRESSURE: 65 MMHG | SYSTOLIC BLOOD PRESSURE: 95 MMHG | WEIGHT: 130 LBS

## 2025-03-20 DIAGNOSIS — R30.0 DYSURIA: Primary | ICD-10-CM

## 2025-03-20 PROCEDURE — 87086 URINE CULTURE/COLONY COUNT: CPT | Performed by: PHYSICIAN ASSISTANT

## 2025-03-20 PROCEDURE — 99000 SPECIMEN HANDLING OFFICE-LAB: CPT | Performed by: PATHOLOGY

## 2025-03-20 NOTE — PATIENT INSTRUCTIONS
PLAN:   - Recommend vaginal estrogen - agree with OBGYN  - Drink plenty of fluids  - Bladderscan ultrasound   - Will get repeat urine culture today   - If urine culture grows E coli, will treat for 10 days with cefpodoxime 200mg twice daily.  If the infection returns AGAIN after treating with cefpodoxime, will treat again with cefpodoxime, refer to Infectious Disease, and start methenamine hippurate.    - Wouldn't recommend prophy antibiotics.    - Call Radiology about the stone.  If theres a stone and you keep getting infections, we'll need to remove the stone.    - Return in 3 months     RICHARD [facility]

## 2025-03-20 NOTE — LETTER
3/20/2025       RE: Aga Alanis  [address] [address] [facility] [place] [facility] [address]     Dear Colleague,    Thank you for referring your patient, Aga Alanis, to the [facility] UROLOGY CLINIC [place] at [facility]. Please see a copy of my visit note below.    HPI: Ms. Aga Alanis is a 45 year old year old female presenting today for evaluation of chief complaint(s): Follow Up (UTI follow-up)    She is unaccompanied    She has PMH significant for GERD, NNEKA, S/p lap hysterectomy (for heavy menses) c/p vaginal cuff bleed requiring takeback for repair (2018), s/p Lap Gastric Sleeve (2021), who was last seen by me 1/25/24 for recurrent UTIs and gross hematuria with clots.  UA showed significant RBCs and WBCs, but culture was negative. CT urogram was performed and showed bilateral hydronephrosis with radiologic concern for BL ureteroceles, although SCr was normal.  She also had a right 3mm mid ureteral stone.    She underwent cysto with Dr. Stoddard on 3/6/24 showing BL ureteroceles which were excised on 5/24/24 by Dr. Stoddard, postop course c/b but gross hematuria which resolved spontaneously.  A follow up renal US on 12/27/24 showed resolved ureteroceles and resolved hydronephrosis.  Flank pain resolved.  At last visit with Dr. Stoddard on 1/15/25 flank pain had resolved, was doing PFPT for pinching sensation, had an episode of UTI symptoms that resolved with macrobid despite neg urine testing.     Today she returns.   Recent CT AP 1/27/25 -with contrast showed normal kidneys and bladder but thickening and hyperenhancement of the vaginal cuff with trace amount of fluid suspicious for vaginitis. GC/CT and wet prep both negative.  GIven metrogel for presumed BV Given six rounds of antibiotics for UTI symptoms.  First gets an odor.  Then gets pinching sensation and cannot void.    Has been getting macrobid or cephalexin.   Last dose of antibiotics was  2/23.  Then culture on 3/17/25 showed no growth.    Thinking of causes:   - has a new gym membership has been doing hot tub then cold plunge --> now has stopped hot tub and cold plunge.    - Has been working with OBGYN for perimenopause symptoms.  Has been using testosterone on the labia and libido is improving.  Now has been starting on estradiol cream once daily x 2 weeks then twice weekly   - Started taking [place] Probiotic and is alternating     REVIEW OF DIAGNOSTICS:  3/17/25 - UA: ketones 40, mucus present, squam 2 --> UCX no growth  3/2/25 - 50-100K E coli (R: bactrim, amp, gent, levaquin cipro)  2/18/25 - No growth  2/11/25 - E coli, E faecalis --> unable to get culture data from [facility] system  1/27/25 - UA: ketones trace, pH 7.5,   1/27/25 - wet prep negative  1/27/25 - GC/CT negative   1/26/25 - E coli (R: bactrim, ampicillin, gent, levaquin, cipro, and intermediate to Amp/sul and cephazolin)  1/3/25 - UA: LE trace (WBC 5-10, RBC 0-2)    Current Outpatient Medications   Medication Sig Dispense Refill     ALPRAZolam (XANAX) 0.25 MG tablet Take 0.25 mg by mouth nightly as needed for anxiety       Biotin 10 MG TABS tablet Take 10 mg by mouth every morning        busPIRone (BUSPAR) 15 MG tablet Take 15 mg by mouth 2 times daily       FLUoxetine (PROZAC) 10 MG capsule        lactobacillus rhamnosus, GG, (CULTURELL) capsule Take 1 capsule by mouth every morning        lamoTRIgine (LAMICTAL) 100 MG tablet        spironolactone (ALDACTONE) 100 MG tablet Take 100 mg by mouth every morning        tirzepatide-Weight Management (ZEPBOUND) 5 MG/0.5ML prefilled pen Inject 0.5 mLs (5 mg) subcutaneously every 7 days. 2 mL 3     UNABLE TO FIND MEDICATION NAME: **PROGEST-VERSA 80MG/GM       UNABLE TO FIND MEDICATION NAME: testosterone cream         ALLERGIES: Sulfa antibiotics, Codeine, Demerol, Wheat, and Metronidazole      REVIEW OF SYSTEMS:  As above in HPI     GENERAL PHYSICAL EXAM:   Vitals: BP 95/65 (BP Location:  "Right arm, Patient Position: Sitting, Cuff Size: Adult Regular)   Pulse 82   Ht 1.626 m (5' 4\")   Wt 59 kg (130 lb)   LMP 06/05/2017   SpO2 99%   BMI 22.31 kg/m    There is no height or weight on file to calculate BMI.    GENERAL: Well groomed, well developed, well nourished female in NAD.  NEURO: Alert and oriented x 3.  PSYCH: Normal mood and affect, pleasant and agreeable during interview and exam.    PVR: Residual urine by ultrasound was zero ml.      RADIOLOGY: The following tests were reviewed:   EXAM: CT ABDOMEN PELVIS W CONTRAST  LOCATION: [facility]  DATE: 1/27/2025     INDICATION: flank pain, lower abdominal pain  COMPARISON: 1/25/2024  TECHNIQUE: CT scan of the abdomen and pelvis was performed following injection of IV contrast. Multiplanar reformats were obtained. Dose reduction techniques were used.  CONTRAST: 64 mL Isovue 370     FINDINGS:   LOWER CHEST: Normal.  HEPATOBILIARY: Normal liver and gallbladder.  PANCREAS: Normal.  SPLEEN: Normal.  ADRENAL GLANDS: Normal.  KIDNEYS/BLADDER: Normal.     BOWEL: Postoperative changes of gastric sleeve procedure redemonstrated. No abnormal bowel thickening or bowel obstruction. Appendix is normal. No free fluid or free air.     LYMPH NODES: Normal.  VASCULATURE: Normal.     PELVIC ORGANS: Uterus is surgically absent. No abnormal adnexal masses. The vaginal cuff appears thick-walled with mild hyperenhancement, new from prior study. Trace amount of fluid in the vaginal cuff.     MUSCULOSKELETAL: No significant osseous abnormalities. 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                                                          IMPRESSION:      1.  Thickening and hyperenhancement of the vaginal cuff with trace amount of fluid suspicious for vaginitis.  2.  No other abnormality identified in the abdomen and pelvis.    LABS: The last test results for Ms. Aga Alanis were reviewed.   BMP -   Recent Labs "   Lab Test 01/27/25  1659 10/23/24  1112 01/25/24  1239 09/11/21  1152    137  --  139   POTASSIUM 4.1 4.4  --  3.6   CHLORIDE 99 103  --  106   CO2 25 23  --  29   BUN 12.1 9.4  --  8   CR 0.82 0.78 0.7 0.81   GLC 81 98  --  76   SUMEET 9.8 9.7  --  9.1       CBC -   Recent Labs   Lab Test 01/27/25  1659 10/23/24  1112 09/11/21  1151   WBC 5.3 4.2 5.1   HGB 13.3 13.9 13.7    230 268       ASSESSMENT:   1) Recurrent UTIs      PLAN:   - Recommend vaginal estrogen - agree with OBGYN prescription.  Discuss use daily for 14d then twice weekly thereafter   - Drink plenty of fluids  - Bladderscan ultrasound  = zero PVR   - Will get repeat urine culture today   - If urine culture grows E coli, will treat for 10 days with cefpodoxime 200mg twice daily.  If the infection returns AGAIN after treating with cefpodoxime, will treat again with cefpodoxime, refer to Infectious Disease, and start methenamine hippurate.    - Wouldn't recommend prophy antibiotics - she already has significant antibiotic resistance and we only stand to worsen this.    - Call Radiology about the BL calcifications in the ureteral distribution on the 1/27/25 CT scan.  He agrees that it is difficult to definitively rule out a ureteral stone, but given absence of hydroureter-nephrosis it is less likely to be a stone and more likely to be a phlebolith (as was suggested in previous CT read from 1/2024).  If we continue to have recurrent infections will involve an endourologist for another opinion.   - Return in 3 months     RICHARD Michaud-ZULLY  Department of Urologic Surgery      Again, thank you for allowing me to participate in the care of your patient.      Sincerely,    RICHARD Sow

## 2025-03-20 NOTE — TELEPHONE ENCOUNTER
Left Voicemail (1st Attempt) for the patient to call back and schedule the following:    Appointment type: Return Patient   Provider: Denisse MOJICA  Return date: Return in 3 months with Denisse MOJICA, from visit date 3/20  Specialty phone number: [phone]  Additional appointment(s) needed:   Additonal Notes:

## 2025-03-20 NOTE — NURSING NOTE
"Aga Alanis is a 45 year old female patient that presents today in clinic for the following:    Chief Complaint   Patient presents with    Follow Up     UTI follow-up       The patient's allergies and medications were reviewed as noted. A set of vitals were recorded as noted without incident. The patient does not have any other questions for the provider.    Blood pressure 95/65, pulse 82, height 1.626 m (5' 4\"), weight 59 kg (130 lb), last menstrual period 06/05/2017, SpO2 99%, not currently breastfeeding. Body mass index is 22.31 kg/m .    Patient Active Problem List   Diagnosis    Pain in joint involving ankle and foot, right    Acute bleeding    History of morbid obesity    S/P laparoscopic sleeve gastrectomy    Ureterocele, congenital       Allergies   Allergen Reactions    Sulfa Antibiotics Anaphylaxis     Antibiotic, but unsure which one    Codeine Nausea and Vomiting    Demerol Nausea and Vomiting    Wheat Other (See Comments)     Food allergy tested and confirmed by [facility]    Metronidazole Rash       Current Outpatient Medications   Medication Sig Dispense Refill    ALPRAZolam (XANAX) 0.25 MG tablet Take 0.25 mg by mouth nightly as needed for anxiety      Biotin 10 MG TABS tablet Take 10 mg by mouth every morning       busPIRone (BUSPAR) 15 MG tablet Take 15 mg by mouth 2 times daily      FLUoxetine (PROZAC) 10 MG capsule       lactobacillus rhamnosus, GG, (CULTURELL) capsule Take 1 capsule by mouth every morning       lamoTRIgine (LAMICTAL) 100 MG tablet       spironolactone (ALDACTONE) 100 MG tablet Take 100 mg by mouth every morning       tirzepatide-Weight Management (ZEPBOUND) 5 MG/0.5ML prefilled pen Inject 0.5 mLs (5 mg) subcutaneously every 7 days. 2 mL 3    UNABLE TO FIND MEDICATION NAME: testosterone cream      UNABLE TO FIND MEDICATION NAME: **PROGEST-VERSA 80MG/GM         Social History     Tobacco Use    Smoking status: Never    Smokeless tobacco: Never   Vaping Use    Vaping " status: Never Used   Substance Use Topics    Alcohol use: Yes    Drug use: No       Margie Green RN  3/20/2025  9:16 AM

## 2025-03-20 NOTE — PROGRESS NOTES
HPI: Ms. Aga Alanis is a 45 year old year old female presenting today for evaluation of chief complaint(s): Follow Up (UTI follow-up)    She is unaccompanied    She has PMH significant for GERD, NNEKA, S/p lap hysterectomy (for heavy menses) c/p vaginal cuff bleed requiring takeback for repair (2018), s/p Lap Gastric Sleeve (2021), who was last seen by me 1/25/24 for recurrent UTIs and gross hematuria with clots.  UA showed significant RBCs and WBCs, but culture was negative. CT urogram was performed and showed bilateral hydronephrosis with radiologic concern for BL ureteroceles, although SCr was normal.  She also had a right 3mm mid ureteral stone.    She underwent cysto with Dr. Stoddard on 3/6/24 showing BL ureteroceles which were excised on 5/24/24 by Dr. Stoddard, postop course c/b but gross hematuria which resolved spontaneously.  A follow up renal US on 12/27/24 showed resolved ureteroceles and resolved hydronephrosis.  Flank pain resolved.  At last visit with Dr. Stoddard on 1/15/25 flank pain had resolved, was doing PFPT for pinching sensation, had an episode of UTI symptoms that resolved with macrobid despite neg urine testing.     Today she returns.   Recent CT AP 1/27/25 -with contrast showed normal kidneys and bladder but thickening and hyperenhancement of the vaginal cuff with trace amount of fluid suspicious for vaginitis. GC/CT and wet prep both negative.  GIven metrogel for presumed BV Given six rounds of antibiotics for UTI symptoms.  First gets an odor.  Then gets pinching sensation and cannot void.    Has been getting macrobid or cephalexin.   Last dose of antibiotics was 2/23.  Then culture on 3/17/25 showed no growth.    Thinking of causes:   - has a new gym membership has been doing hot tub then cold plunge --> now has stopped hot tub and cold plunge.    - Has been working with OBGYN for perimenopause symptoms.  Has been using testosterone on the labia and libido is improving.  Now has been  "starting on estradiol cream once daily x 2 weeks then twice weekly   - Started taking [place] Probiotic and is alternating     REVIEW OF DIAGNOSTICS:  3/17/25 - UA: ketones 40, mucus present, squam 2 --> UCX no growth  3/2/25 - 50-100K E coli (R: bactrim, amp, gent, levaquin cipro)  2/18/25 - No growth  2/11/25 - E coli, E faecalis --> unable to get culture data from [facility] system  1/27/25 - UA: ketones trace, pH 7.5,   1/27/25 - wet prep negative  1/27/25 - GC/CT negative   1/26/25 - E coli (R: bactrim, ampicillin, gent, levaquin, cipro, and intermediate to Amp/sul and cephazolin)  1/3/25 - UA: LE trace (WBC 5-10, RBC 0-2)    Current Outpatient Medications   Medication Sig Dispense Refill    ALPRAZolam (XANAX) 0.25 MG tablet Take 0.25 mg by mouth nightly as needed for anxiety      Biotin 10 MG TABS tablet Take 10 mg by mouth every morning       busPIRone (BUSPAR) 15 MG tablet Take 15 mg by mouth 2 times daily      FLUoxetine (PROZAC) 10 MG capsule       lactobacillus rhamnosus, GG, (CULTURELL) capsule Take 1 capsule by mouth every morning       lamoTRIgine (LAMICTAL) 100 MG tablet       spironolactone (ALDACTONE) 100 MG tablet Take 100 mg by mouth every morning       tirzepatide-Weight Management (ZEPBOUND) 5 MG/0.5ML prefilled pen Inject 0.5 mLs (5 mg) subcutaneously every 7 days. 2 mL 3    UNABLE TO FIND MEDICATION NAME: **PROGEST-VERSA 80MG/GM      UNABLE TO FIND MEDICATION NAME: testosterone cream         ALLERGIES: Sulfa antibiotics, Codeine, Demerol, Wheat, and Metronidazole      REVIEW OF SYSTEMS:  As above in HPI     GENERAL PHYSICAL EXAM:   Vitals: BP 95/65 (BP Location: Right arm, Patient Position: Sitting, Cuff Size: Adult Regular)   Pulse 82   Ht 1.626 m (5' 4\")   Wt 59 kg (130 lb)   LMP 06/05/2017   SpO2 99%   BMI 22.31 kg/m    There is no height or weight on file to calculate BMI.    GENERAL: Well groomed, well developed, well nourished female in NAD.  NEURO: Alert and oriented x 3.  PSYCH: " Normal mood and affect, pleasant and agreeable during interview and exam.    PVR: Residual urine by ultrasound was zero ml.      RADIOLOGY: The following tests were reviewed:   EXAM: CT ABDOMEN PELVIS W CONTRAST  LOCATION: [facility]  DATE: 1/27/2025     INDICATION: flank pain, lower abdominal pain  COMPARISON: 1/25/2024  TECHNIQUE: CT scan of the abdomen and pelvis was performed following injection of IV contrast. Multiplanar reformats were obtained. Dose reduction techniques were used.  CONTRAST: 64 mL Isovue 370     FINDINGS:   LOWER CHEST: Normal.  HEPATOBILIARY: Normal liver and gallbladder.  PANCREAS: Normal.  SPLEEN: Normal.  ADRENAL GLANDS: Normal.  KIDNEYS/BLADDER: Normal.     BOWEL: Postoperative changes of gastric sleeve procedure redemonstrated. No abnormal bowel thickening or bowel obstruction. Appendix is normal. No free fluid or free air.     LYMPH NODES: Normal.  VASCULATURE: Normal.     PELVIC ORGANS: Uterus is surgically absent. No abnormal adnexal masses. The vaginal cuff appears thick-walled with mild hyperenhancement, new from prior study. Trace amount of fluid in the vaginal cuff.     MUSCULOSKELETAL: No significant osseous abnormalities. 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                                                          IMPRESSION:      1.  Thickening and hyperenhancement of the vaginal cuff with trace amount of fluid suspicious for vaginitis.  2.  No other abnormality identified in the abdomen and pelvis.    LABS: The last test results for Ms. Aga Alanis were reviewed.   BMP -   Recent Labs   Lab Test 01/27/25  1659 10/23/24  1112 01/25/24  1239 09/11/21  1152    137  --  139   POTASSIUM 4.1 4.4  --  3.6   CHLORIDE 99 103  --  106   CO2 25 23  --  29   BUN 12.1 9.4  --  8   CR 0.82 0.78 0.7 0.81   GLC 81 98  --  76   SUMEET 9.8 9.7  --  9.1       CBC -   Recent Labs   Lab Test 01/27/25  1659 10/23/24  1112 09/11/21  1151    WBC 5.3 4.2 5.1   HGB 13.3 13.9 13.7    230 268       ASSESSMENT:   1) Recurrent UTIs      PLAN:   - Recommend vaginal estrogen - agree with OBGYN prescription.  Discuss use daily for 14d then twice weekly thereafter   - Drink plenty of fluids  - Bladderscan ultrasound  = zero PVR   - Will get repeat urine culture today   - If urine culture grows E coli, will treat for 10 days with cefpodoxime 200mg twice daily.  If the infection returns AGAIN after treating with cefpodoxime, will treat again with cefpodoxime, refer to Infectious Disease, and start methenamine hippurate.    - Wouldn't recommend prophy antibiotics - she already has significant antibiotic resistance and we only stand to worsen this.    - Call Radiology about the BL calcifications in the ureteral distribution on the 1/27/25 CT scan.  He agrees that it is difficult to definitively rule out a ureteral stone, but given absence of hydroureter-nephrosis it is less likely to be a stone and more likely to be a phlebolith (as was suggested in previous CT read from 1/2024).  If we continue to have recurrent infections will involve an endourologist for another opinion.   - Return in 3 months     Nelly Ho PA-C  Department of Urologic Surgery

## 2025-03-24 NOTE — TELEPHONE ENCOUNTER
Left Voicemail (2nd Attempt) for the patient to call back and schedule the following:     Appointment type: Return Patient   Provider: Denisse MOJICA  Return date: Return in 3 months with Denisse MOJICA, from visit date 3/20  Specialty phone number: [phone]  Additional appointment(s) needed:   Additonal Notes:

## 2025-03-25 ENCOUNTER — LAB (OUTPATIENT)
Dept: LAB | Facility: CLINIC | Age: 45
End: 2025-03-25
Payer: COMMERCIAL

## 2025-03-25 DIAGNOSIS — N39.0 RECURRENT UTI: Primary | ICD-10-CM

## 2025-03-25 DIAGNOSIS — N39.0 RECURRENT UTI: ICD-10-CM

## 2025-03-25 LAB
ALBUMIN UR-MCNC: NEGATIVE MG/DL
APPEARANCE UR: ABNORMAL
BACTERIA #/AREA URNS HPF: ABNORMAL /HPF
BILIRUB UR QL STRIP: NEGATIVE
COLOR UR AUTO: ABNORMAL
GLUCOSE UR STRIP-MCNC: NEGATIVE MG/DL
HGB UR QL STRIP: NEGATIVE
KETONES UR STRIP-MCNC: NEGATIVE MG/DL
LEUKOCYTE ESTERASE UR QL STRIP: ABNORMAL
MUCOUS THREADS #/AREA URNS LPF: PRESENT /LPF
NITRATE UR QL: POSITIVE
PH UR STRIP: 8 [PH] (ref 5–7)
RBC URINE: 2 /HPF
SP GR UR STRIP: 1.02 (ref 1–1.03)
SQUAMOUS EPITHELIAL: 3 /HPF
UROBILINOGEN UR STRIP-MCNC: NORMAL MG/DL
WBC URINE: 6 /HPF

## 2025-03-25 PROCEDURE — 87186 SC STD MICRODIL/AGAR DIL: CPT

## 2025-03-25 PROCEDURE — 81001 URINALYSIS AUTO W/SCOPE: CPT

## 2025-03-27 LAB
BACTERIA UR CULT: ABNORMAL

## 2025-03-31 ENCOUNTER — TELEPHONE (OUTPATIENT)
Dept: UROLOGY | Facility: CLINIC | Age: 45
End: 2025-03-31
Payer: COMMERCIAL

## 2025-03-31 NOTE — TELEPHONE ENCOUNTER
Action March 31, 2025  8:39 AM KELLY   Action Taken  MRI order faxed to [facility] at [phone].

## 2025-04-01 DIAGNOSIS — N39.0 RECURRENT UTI (URINARY TRACT INFECTION): Primary | ICD-10-CM

## 2025-04-01 RX ORDER — METHENAMINE HIPPURATE 1000 MG/1
1 TABLET ORAL 2 TIMES DAILY
Qty: 180 TABLET | Refills: 1 | Status: SHIPPED | OUTPATIENT
Start: 2025-04-01

## 2025-04-01 RX ORDER — AMOXICILLIN 500 MG/1
500 CAPSULE ORAL 3 TIMES DAILY
Qty: 30 CAPSULE | Refills: 0 | Status: SHIPPED | OUTPATIENT
Start: 2025-04-01

## 2025-04-07 ENCOUNTER — TRANSFERRED RECORDS (OUTPATIENT)
Dept: HEALTH INFORMATION MANAGEMENT | Facility: CLINIC | Age: 45
End: 2025-04-07
Payer: COMMERCIAL

## 2025-04-15 NOTE — CONFIDENTIAL NOTE
DIAGNOSIS:  Recurrent UTI (urinary tract infection)    DATE RECEIVED:  04.16.2025     NOTES (Gather within 2 years) STATUS DETAILS   OFFICE NOTE from referring provider   Internal 04.01.2025  Denisse Ho PA    OFFICE NOTE from other specialist Care Everywhere            Internal 02.18.2025 Michael Abbott MD [facility]     02.11.2025 Trinh Barrera DO  [facility]    01.26.2025  Logan Hahn PA  [facility]      06.26.2024 Aguilar Stoddard MD    LABS (any labs) Internal / CE    MEDICATION LIST Internal / CE

## 2025-04-16 ENCOUNTER — PRE VISIT (OUTPATIENT)
Dept: INFECTIOUS DISEASES | Facility: CLINIC | Age: 45
End: 2025-04-16
Payer: COMMERCIAL

## 2025-04-17 ENCOUNTER — TELEPHONE (OUTPATIENT)
Dept: INFECTIOUS DISEASES | Facility: CLINIC | Age: 45
End: 2025-04-17
Payer: COMMERCIAL

## 2025-04-17 PROBLEM — Z90.710 S/P HYSTERECTOMY: Status: ACTIVE | Noted: 2019-02-06

## 2025-04-17 PROBLEM — N39.0 RECURRENT UTI (URINARY TRACT INFECTION): Status: ACTIVE | Noted: 2025-04-17

## 2025-04-17 PROBLEM — N20.0 RENAL STONE: Status: ACTIVE | Noted: 2024-02-01

## 2025-05-07 ENCOUNTER — VIRTUAL VISIT (OUTPATIENT)
Dept: ENDOCRINOLOGY | Facility: CLINIC | Age: 45
End: 2025-05-07
Payer: COMMERCIAL

## 2025-05-07 VITALS — HEIGHT: 64 IN | BODY MASS INDEX: 21 KG/M2 | WEIGHT: 123 LBS

## 2025-05-07 DIAGNOSIS — Z98.84 S/P LAPAROSCOPIC SLEEVE GASTRECTOMY: ICD-10-CM

## 2025-05-07 DIAGNOSIS — Z86.39 HISTORY OF MORBID OBESITY: Primary | ICD-10-CM

## 2025-05-07 NOTE — NURSING NOTE
Current patient location: at work    Is the patient currently in the state of [place]? YES    Visit mode:VIDEO    If the visit is dropped, the patient can be reconnected by: VIDEO VISIT: Text to cell phone:   Telephone Information:   Mobile [phone]       Will anyone else be joining the visit? NO  (If patient encounters technical issues they should call [phone])    Are changes needed to the allergy or medication list? Pt stated no changes to allergies and Pt stated no med changes    Are refills needed on medications prescribed by this physician? YES    Reason for visit: RECHECK    Kathy CAMPOS

## 2025-05-07 NOTE — PROGRESS NOTES
Return Medical Weight Management Note     Aga Alanis  MRN:  [id number]  :  1980  SHANNON:  2025    Dear Michael Abbott MD,    I had the pleasure of seeing your patient Aga Alanis. She is a 45 year old female who I am continuing to see for treatment of obesity related to:        2/10/2019    11:19 PM   --   I have the following health issues associated with obesity Sleep Apnea    Polycystic Ovarian Syndrome    Weight Bearing Joint Pain       Assessment & Plan   Problem List Items Addressed This Visit       History of morbid obesity - Primary    Relevant Medications    tirzepatide-Weight Management (ZEPBOUND) 2.5 MG/0.5ML prefilled pen    S/P laparoscopic sleeve gastrectomy    Relevant Medications    tirzepatide-Weight Management (ZEPBOUND) 2.5 MG/0.5ML prefilled pen        Comprehensive weight management follow up visit  S/p sleeve     Weight loss from highest 215 to 123 today (42%)  Weight change since last visit: down 16 lbs.  Stable in 120's    Currently taking the following medications that can help with weight loss/weight mgmt:  Zepbound 2.5mg weekly. Tolerating well, weight stable.  5mg had more food aversions, will increase if insurance requires but would like to stay 2.5mg if possible.    Past weight loss medication history and considerations:  Phentermine - was on 8mg prior to surgery and was helpful. Tried 30mg and 15mg with OB and had side effects  Topiramate - side effects    Plan today:  Bariatric labs due again Oct 2025  Continue zepbound 2.5mg 90 days supply    Follow up plan:  Follow up 6 months    INTERVAL HISTORY:    CURRENT WEIGHT:   123 lbs 0 oz    Initial Weight (lbs): 215 lbs  Last Visits Weight: 54.4 kg (120 lb)  Cumulative weight loss (lbs): 92  Weight Loss Percentage: 42.79%        2020    10:52 AM   Changes and Difficulties   I have made the following changes to my diet since my last visit: none   With regards to my diet, I am still struggling with: nothing    I have made the following changes to my activity/exercise since my last visit: none   With regards to my activity/exercise, I am still struggling with: nothing             12/21/2020    10:52 AM   Weight Loss Medication History Reviewed With Patient   Which weight loss medications are you currently taking on a regular basis? Phentermine   If you are having side effects please describe: hunger at night, taking Lomaira in early morning       Lab on 03/25/2025   Component Date Value Ref Range Status    Color Urine 03/25/2025 Light Yellow  Colorless, Straw, Light Yellow, Yellow Final    Appearance Urine 03/25/2025 Slightly Cloudy (A)  Clear Final    Glucose Urine 03/25/2025 Negative  Negative mg/dL Final    Bilirubin Urine 03/25/2025 Negative  Negative Final    Ketones Urine 03/25/2025 Negative  Negative mg/dL Final    Specific Gravity Urine 03/25/2025 1.017  1.003 - 1.035 Final    Blood Urine 03/25/2025 Negative  Negative Final    pH Urine 03/25/2025 8.0 (H)  5.0 - 7.0 Final    Protein Albumin Urine 03/25/2025 Negative  Negative mg/dL Final    Urobilinogen Urine 03/25/2025 Normal  Normal mg/dL Final    Nitrite Urine 03/25/2025 Positive (A)  Negative Final    Leukocyte Esterase Urine 03/25/2025 Trace (A)  Negative Final    Bacteria Urine 03/25/2025 Few (A)  None Seen /HPF Final    Mucus Urine 03/25/2025 Present (A)  None Seen /LPF Final    RBC Urine 03/25/2025 2  <=2 /HPF Final    WBC Urine 03/25/2025 6 (H)  <=5 /HPF Final    Squamous Epithelials Urine 03/25/2025 3 (H)  <=1 /HPF Final    Culture 03/25/2025 >100,000 CFU/mL Escherichia coli (A)   Final    Culture 03/25/2025 50,000-100,000 CFU/mL Urogenital bonnie   Final    Culture 03/25/2025 50,000-100,000 CFU/mL Enterococcus faecalis (A)   Final       MEDICATIONS:   Current Outpatient Medications   Medication Sig Dispense Refill    tirzepatide-Weight Management (ZEPBOUND) 2.5 MG/0.5ML prefilled pen Inject 0.5 mLs (2.5 mg) subcutaneously every 7 days. 6 mL 1     "ALPRAZolam (XANAX) 0.25 MG tablet Take 0.25 mg by mouth nightly as needed for anxiety      Biotin 10 MG TABS tablet Take 10 mg by mouth every morning       fluconazole (DIFLUCAN) 200 MG tablet Take 1 tablet (200 mg) by mouth every 3 days. While symptomatic 2 tablet 0    lactobacillus rhamnosus, GG, (CULTURELL) capsule Take 1 capsule by mouth every morning       lamoTRIgine (LAMICTAL) 100 MG tablet       methenamine hippurate (HIPREX) 1 g tablet Take 1 tablet (1 g) by mouth 2 times daily. (Take with vitamin C) 180 tablet 1    spironolactone (ALDACTONE) 100 MG tablet Take 100 mg by mouth every morning       tirzepatide-Weight Management (ZEPBOUND) 5 MG/0.5ML prefilled pen Inject 0.5 mLs (5 mg) subcutaneously every 7 days. 2 mL 3    UNABLE TO FIND MEDICATION NAME: testosterone cream      vitamin C (ASCORBIC ACID) 500 MG tablet Take 1 tablet (500 mg) by mouth daily. 180 tablet 1         PHYSICAL EXAM:  Objective    Ht 1.626 m (5' 4.02\")   Wt 55.8 kg (123 lb)   LMP 06/05/2017   BMI 21.10 kg/m             Vitals:  No vitals were obtained today due to virtual visit.    GENERAL: alert and no distress  EYES: Eyes grossly normal to inspection.  No discharge or erythema, or obvious scleral/conjunctival abnormalities.  RESP: No audible wheeze, cough, or visible cyanosis.    SKIN: Visible skin clear. No significant rash, abnormal pigmentation or lesions.  NEURO: Cranial nerves grossly intact.  Mentation and speech appropriate for age.  PSYCH: Appropriate affect, tone, and pace of words        Sincerely,    Zuri Dodd PA-C      10 minutes spent by me on the date of the encounter doing chart review, history and exam, documentation and further activities per the note    Virtual Visit Details    Type of service:  Video Visit     Originating Location (pt. Location): Home    Distant Location (provider location):  Off-site  Platform used for Video Visit: Julio Cesar    The longitudinal plan of care for the " diagnosis(es)/condition(s) as documented were addressed during this visit. Due to the added complexity in care, I will continue to support Aga in the subsequent management and with ongoing continuity of care.

## 2025-05-07 NOTE — LETTER
2025       RE: Aga Alanis  [address] [address] [facility] [place] [address]     Dear Colleague,    Thank you for referring your patient, Aga Alanis, to the [facility] [place] at [facility]. Please see a copy of my visit note below.      Return Medical Weight Management Note     Aga Alanis  MRN:  [id number]  :  1980  SHANNON:  2025    Dear Michael Abbott MD,    I had the pleasure of seeing your patient Aga Alanis. She is a 45 year old female who I am continuing to see for treatment of obesity related to:        2/10/2019    11:19 PM   --   I have the following health issues associated with obesity Sleep Apnea    Polycystic Ovarian Syndrome    Weight Bearing Joint Pain       Assessment & Plan  Problem List Items Addressed This Visit       History of morbid obesity - Primary    Relevant Medications    tirzepatide-Weight Management (ZEPBOUND) 2.5 MG/0.5ML prefilled pen    S/P laparoscopic sleeve gastrectomy    Relevant Medications    tirzepatide-Weight Management (ZEPBOUND) 2.5 MG/0.5ML prefilled pen        Comprehensive weight management follow up visit  S/p sleeve     Weight loss from highest 215 to 123 today (42%)  Weight change since last visit: down 16 lbs.  Stable in 120's    Currently taking the following medications that can help with weight loss/weight mgmt:  Zepbound 2.5mg weekly. Tolerating well, weight stable.  5mg had more food aversions, will increase if insurance requires but would like to stay 2.5mg if possible.    Past weight loss medication history and considerations:  Phentermine - was on 8mg prior to surgery and was helpful. Tried 30mg and 15mg with OB and had side effects  Topiramate - side effects    Plan today:  Bariatric labs due again Oct 2025  Continue zepbound 2.5mg 90 days supply    Follow up plan:  Follow up 6 months    INTERVAL HISTORY:    CURRENT WEIGHT:   123 lbs 0  oz    Initial Weight (lbs): 215 lbs  Last Visits Weight: 54.4 kg (120 lb)  Cumulative weight loss (lbs): 92  Weight Loss Percentage: 42.79%        12/21/2020    10:52 AM   Changes and Difficulties   I have made the following changes to my diet since my last visit: none   With regards to my diet, I am still struggling with: nothing   I have made the following changes to my activity/exercise since my last visit: none   With regards to my activity/exercise, I am still struggling with: nothing             12/21/2020    10:52 AM   Weight Loss Medication History Reviewed With Patient   Which weight loss medications are you currently taking on a regular basis? Phentermine   If you are having side effects please describe: hunger at night, taking Lomaira in early morning       Lab on 03/25/2025   Component Date Value Ref Range Status     Color Urine 03/25/2025 Light Yellow  Colorless, Straw, Light Yellow, Yellow Final     Appearance Urine 03/25/2025 Slightly Cloudy (A)  Clear Final     Glucose Urine 03/25/2025 Negative  Negative mg/dL Final     Bilirubin Urine 03/25/2025 Negative  Negative Final     Ketones Urine 03/25/2025 Negative  Negative mg/dL Final     Specific Gravity Urine 03/25/2025 1.017  1.003 - 1.035 Final     Blood Urine 03/25/2025 Negative  Negative Final     pH Urine 03/25/2025 8.0 (H)  5.0 - 7.0 Final     Protein Albumin Urine 03/25/2025 Negative  Negative mg/dL Final     Urobilinogen Urine 03/25/2025 Normal  Normal mg/dL Final     Nitrite Urine 03/25/2025 Positive (A)  Negative Final     Leukocyte Esterase Urine 03/25/2025 Trace (A)  Negative Final     Bacteria Urine 03/25/2025 Few (A)  None Seen /HPF Final     Mucus Urine 03/25/2025 Present (A)  None Seen /LPF Final     RBC Urine 03/25/2025 2  <=2 /HPF Final     WBC Urine 03/25/2025 6 (H)  <=5 /HPF Final     Squamous Epithelials Urine 03/25/2025 3 (H)  <=1 /HPF Final     Culture 03/25/2025 >100,000 CFU/mL Escherichia coli (A)   Final     Culture 03/25/2025  "50,000-100,000 CFU/mL Urogenital bonnie   Final     Culture 03/25/2025 50,000-100,000 CFU/mL Enterococcus faecalis (A)   Final       MEDICATIONS:   Current Outpatient Medications   Medication Sig Dispense Refill     tirzepatide-Weight Management (ZEPBOUND) 2.5 MG/0.5ML prefilled pen Inject 0.5 mLs (2.5 mg) subcutaneously every 7 days. 6 mL 1     ALPRAZolam (XANAX) 0.25 MG tablet Take 0.25 mg by mouth nightly as needed for anxiety       Biotin 10 MG TABS tablet Take 10 mg by mouth every morning        fluconazole (DIFLUCAN) 200 MG tablet Take 1 tablet (200 mg) by mouth every 3 days. While symptomatic 2 tablet 0     lactobacillus rhamnosus, GG, (CULTURELL) capsule Take 1 capsule by mouth every morning        lamoTRIgine (LAMICTAL) 100 MG tablet        methenamine hippurate (HIPREX) 1 g tablet Take 1 tablet (1 g) by mouth 2 times daily. (Take with vitamin C) 180 tablet 1     spironolactone (ALDACTONE) 100 MG tablet Take 100 mg by mouth every morning        tirzepatide-Weight Management (ZEPBOUND) 5 MG/0.5ML prefilled pen Inject 0.5 mLs (5 mg) subcutaneously every 7 days. 2 mL 3     UNABLE TO FIND MEDICATION NAME: testosterone cream       vitamin C (ASCORBIC ACID) 500 MG tablet Take 1 tablet (500 mg) by mouth daily. 180 tablet 1         PHYSICAL EXAM:  Objective   Ht 1.626 m (5' 4.02\")   Wt 55.8 kg (123 lb)   LMP 06/05/2017   BMI 21.10 kg/m             Vitals:  No vitals were obtained today due to virtual visit.    GENERAL: alert and no distress  EYES: Eyes grossly normal to inspection.  No discharge or erythema, or obvious scleral/conjunctival abnormalities.  RESP: No audible wheeze, cough, or visible cyanosis.    SKIN: Visible skin clear. No significant rash, abnormal pigmentation or lesions.  NEURO: Cranial nerves grossly intact.  Mentation and speech appropriate for age.  PSYCH: Appropriate affect, tone, and pace of words        Sincerely,    Zuri Dodd, DONITA      10 minutes spent by me on the date of " the encounter doing chart review, history and exam, documentation and further activities per the note    Virtual Visit Details    Type of service:  Video Visit     Originating Location (pt. Location): Home    Distant Location (provider location):  Off-site  Platform used for Video Visit: ModusP    The longitudinal plan of care for the diagnosis(es)/condition(s) as documented were addressed during this visit. Due to the added complexity in care, I will continue to support Aga in the subsequent management and with ongoing continuity of care.      Again, thank you for allowing me to participate in the care of your patient.      Sincerely,    Zuri Dodd PA-C

## 2025-05-08 ENCOUNTER — TELEPHONE (OUTPATIENT)
Dept: ENDOCRINOLOGY | Facility: CLINIC | Age: 45
End: 2025-05-08
Payer: COMMERCIAL

## 2025-05-08 NOTE — TELEPHONE ENCOUNTER
Left Voicemail (1st Attempt) for the patient to call back and schedule the following:    Appointment type: return   Provider: Zuri NARANJO   Return date: 11/7/2025  Specialty phone number: [phone]  Additional appointment(s) needed:   Additonal Notes:

## 2025-05-12 ENCOUNTER — MYC MEDICAL ADVICE (OUTPATIENT)
Dept: ENDOCRINOLOGY | Facility: CLINIC | Age: 45
End: 2025-05-12
Payer: COMMERCIAL

## 2025-05-12 DIAGNOSIS — Z86.39 HISTORY OF MORBID OBESITY: ICD-10-CM

## 2025-05-14 DIAGNOSIS — Z86.39 HISTORY OF MORBID OBESITY: ICD-10-CM

## 2025-05-14 DIAGNOSIS — Z98.84 S/P LAPAROSCOPIC SLEEVE GASTRECTOMY: ICD-10-CM

## 2025-07-12 ENCOUNTER — HEALTH MAINTENANCE LETTER (OUTPATIENT)
Age: 45
End: 2025-07-12

## 2025-07-16 ENCOUNTER — VIRTUAL VISIT (OUTPATIENT)
Dept: INFECTIOUS DISEASES | Facility: CLINIC | Age: 45
End: 2025-07-16
Attending: INTERNAL MEDICINE
Payer: COMMERCIAL

## 2025-07-16 VITALS — BODY MASS INDEX: 21.34 KG/M2 | HEIGHT: 64 IN | WEIGHT: 125 LBS

## 2025-07-16 DIAGNOSIS — Z78.0 MENOPAUSE: ICD-10-CM

## 2025-07-16 DIAGNOSIS — Z79.2 ENCOUNTER FOR LONG-TERM (CURRENT) USE OF ANTIBIOTICS: ICD-10-CM

## 2025-07-16 DIAGNOSIS — N39.0 RECURRENT UTI (URINARY TRACT INFECTION): Primary | ICD-10-CM

## 2025-07-16 DIAGNOSIS — Z98.890 HISTORY OF TRANSURETHRAL RESECTION OF URETEROCELE: ICD-10-CM

## 2025-07-16 PROCEDURE — 1126F AMNT PAIN NOTED NONE PRSNT: CPT | Performed by: INTERNAL MEDICINE

## 2025-07-16 PROCEDURE — 98005 SYNCH AUDIO-VIDEO EST LOW 20: CPT | Mod: FS | Performed by: INTERNAL MEDICINE

## 2025-07-16 RX ORDER — METHENAMINE HIPPURATE 1000 MG/1
1 TABLET ORAL 2 TIMES DAILY
Qty: 180 TABLET | Refills: 1 | Status: SHIPPED | OUTPATIENT
Start: 2025-07-16

## 2025-07-16 RX ORDER — ASCORBIC ACID 500 MG
500 TABLET ORAL DAILY
Qty: 180 TABLET | Refills: 1 | Status: SHIPPED | OUTPATIENT
Start: 2025-07-16

## 2025-07-16 ASSESSMENT — PAIN SCALES - GENERAL: PAINLEVEL_OUTOF10: NO PAIN (0)

## 2025-07-16 NOTE — PROGRESS NOTES
[facility]    Division of Infectious Diseases and International Medicine    CLINICAL INFECTIOUS DISEASE OUTPATIENT                     FOLLOW UP CONSULTATION NOTE     Patient:  Aga Alanis  Date of birth 1980  Medical record number [id number]  Date of Visit:  July 16, 2025  Reason for Visit: Recurrent UTIs in the setting of prior bilateral ureterocele surgery  Initial visit: Progress Notes by Jh Guerin MD (04/16/2025 1:30 PM)     Telehealth Visit Details    Video Start Time: 13:10  Video End Time (time video stopped): 13:30  Originating Location (pt. location): Home  Additional Participants in Telehealth Visit: None   Distant Location (provider location):   [facility] INFECTIOUS DISEASE CLINIC [place]  Mode of Communication (Audio Visual or Audio Only):  Audio Visual         Assessment and Plan     Problem List:    Recurrent UTIs  Genitourinary syndrome of menopause  History of  bilateral  congenital ureteroceles s/p excision in 2024    Impression:    Ms. Alanis is a 45-year-old woman with history of UTIs, prior bilateral ureterocele resection, hysterectomy, and genitourinary syndrome of menopause, now seen for follow-up on UTI prevention. She has no concern for upper infections, stones or other niduses of infection. Since April, she had no other UTI episodes. After completing 2 week-long therapy with nitrofurantoin for acute cystitis, she remained on methenamine hippurate + vitamin C.     Of note, we weren't sure if all her symptoms were due to acute infection vs other etiologies given that many UA samples showed no WBCs and did not grow anything.     She continues to  work with [employer], and her estrogen therapy for atrophy was recently changed to prasterone. While prasterone is not as extensively studied for UTI prevention, it may have local hormonal effects that support the urogenital mucosa, potentially having similar benefits.as  "estrogen.     We will continue suppression with methenamine which is a low-risk medication supported by existing evidence from ALTAIR [1]. According to this study, prophylaxis with methenamine may provide benefit comparable to prophylaxis with antibiotics (on average, 0.5 less UTI episodes per year with antibiotics compared with methenamine). We will tentatively plan to continue for another 6 months, which should be sufficient - but we will reassess at that time.     Plan:    Continue methenamine hippurate with vitamin C for additional six months     Prasterone (DHEA) therapy is expected to provide similar epithelial and local hormonal support as estrogen, likely will also be beneficial     Recommend obtaining urinalysis and urine culture before starting any antibiotics if UTI is suspected.     Follow up with us in 6 months.     Plan was discussed with attending physician, Dr. Hutson.    Jh Guerin MD   Infectious Disease Fellow   Available on Vocera    [1] Neisha et al. Alternative to prophylactic antibiotics for the treatment of recurrent urinary tract infections in women: multicentre, open label, randomised, non-inferiority trial BMJ 2022; 376 :[id number] doi:10.1136/mbw-[id number]       Antimicrobials & Cultures Consolidated     Previous:  Cephalexin 1/29/2025-2/4/2025 (7 days)  nitrofurantoin 1/7/2025-1/11/2025 (5 days)  cephalexin 2/12/2025-2/18/2025 (7 days)  nitrofurantoin 3/1/2025-3/7/2025 (7 days)  amoxicillin with cefpodoxime 4/16/2025 - 4/17/2025  Nitrofurantoin 4/17/2025-4/20/2025 (14 days)     Urinalysis and Cultures:  UA negative on 1/3/2025, 1/27/2025, and 3/17/2025.   UA and urine culture both negative on 1/15/2025.  Positive on 3/27 with E coli and Enterococcus in cultures        Interval Events     Reports having no other UTIs since last visit. Was switched to prasterone today instead of estrogen cream. Otherwise stable.          Physical Exam:     VITAL SIGNS:  Height 1.626 m (5' 4\"), weight " 56.7 kg (125 lb), last menstrual period 06/05/2017, not currently breastfeeding.     GENERAL APPEARANCE: Not in acute distress    PHYSICAL EXAM:     Limited due to video-visit.     Patient appears well and in no acute distress. Alert, oriented, and conversant throughout the visit. No visible rashes or lesions noted on exposed skin. Breathing comfortably without signs of respiratory distress. No facial asymmetry or gross neurologic deficits observed. Voice clear and speech is fluent.         Data:     Lab Results   Component Value Date    WBCU 6 (H) 03/25/2025    WBCU 1 03/17/2025    WBCU 1 01/27/2025    WBCU 5-10 (A) 01/03/2025    WBCU >182 (H) 05/31/2024    WBCU 0-5 05/21/2024    WBCU 0 01/16/2010    NITRITE Positive (A) 03/25/2025    NITRITE Negative 03/17/2025    NITRITE Negative 01/27/2025    NITRITE Negative 01/03/2025    NITRITE Negative 05/31/2024    NITRITE Negative 05/21/2024    NITRITE Negative 02/17/2021    NITRITE Negative 01/16/2010     Culture (3/2/25): E. coli and E. faecalis, susceptible to nitrofurantoin  Prior CT A/P: No hydronephrosis; post-ureterocele resection; vaginal cuff thickening  MRI with only bladder wall thickening  PVR: 0 mL

## 2025-07-16 NOTE — Clinical Note
"7/16/2025       RE: Aga Alanis  [address]  [facility] [address]     Dear Colleague,    Thank you for referring your patient, Aga Alanis, to the [facility] INFECTIOUS DISEASE CLINIC [facility]. Please see a copy of my visit note below.    Virtual Visit Details    Type of service:  Video Visit     Originating Location (pt. Location): {video visit patient location:415740::\"Home\"}  {PROVIDER LOCATION On-site should be selected for visits conducted from your clinic location or adjoining [facility] hospital, academic office, or other nearby [facility] building. Off-site should be selected for all other provider locations, including home:360[address]}  Distant Location (provider location):  {virtual location provider:247998}  Platform used for Video Visit: {Virtual Visit Platforms:733424::\"Yugma\"}     [facility]    Division of Infectious Diseases and International Medicine    CLINICAL INFECTIOUS DISEASE OUTPATIENT                     FOLLOW UP CONSULTATION NOTE     Patient:  Aga Alanis  Date of birth 1980  Medical record number [id number]  Date of Visit:  July 16, 2025  Reason for Visit: Recurrent UTIs in the setting of prior bilateral ureterocele surgery  Initial visit: Progress Notes by Jh Guerin MD (04/16/2025 1:30 PM)          Assessment and Plan     Problem List:    Recurrent UTIs  Genitourinary syndrome of menopause  History of  bilateral  congenital ureteroceles s/p excision in 2024    Impression:    Ms. Alanis is a 45-year-old woman with history of UTIs, prior bilateral ureterocele resection, hysterectomy, and genitourinary syndrome of menopause, now seen for follow-up on UTI prevention. She has no concern for upper infections, stones or other niduses of infection. Since April, she had no other UTI episodes. After completing 2 week-long therapy with nitrofurantoin for acute cystitis, she " remained on methenamine hippurate + vitamin C.     Of note, we weren't sure if all her symptoms were due to acute infection vs other etiologies given that many UA samples showed no WBCs and did not grow anything.     She continues to  work with [employer] [facility], and her estrogen therapy for atrophy was recently changed to prasterone. While prasterone is not as extensively studied for UTI prevention, it may have local hormonal effects that support the urogenital mucosa, potentially having similar benefits.as estrogen.     We will continue suppression with methenamine which is a low-risk medication supported by existing evidence from ALTAIR [1]. According to this study, prophylaxis with methenamine may provide benefit comparable to prophylaxis with antibiotics (on average, 0.5 less UTI episodes per year with antibiotics compared with methenamine). We will tentatively plan to continue for another 6 months, which should be sufficient - but we will reassess at that time.     Plan:    Continue methenamine hippurate with vitamin C for additional six months     Prasterone (DHEA) therapy is expected to provide similar epithelial and local hormonal support as estrogen, likely will also be beneficial     Recommend obtaining urinalysis and urine culture before starting any antibiotics if UTI is suspected.     Follow up with us in 6 months.     Plan was discussed with attending physician, Dr. Hutson.    Jh Guerin MD   Infectious Disease Fellow   Available on Vocera    [1] Neisha et al. Alternative to prophylactic antibiotics for the treatment of recurrent urinary tract infections in women: multicentre, open label, randomised, non-inferiority trial BMJ 2022; 376 :[id number] doi:10.1136/vmx-[id number]       Antimicrobials & Cultures Consolidated     Previous:  Cephalexin 1/29/2025-2/4/2025 (7 days)  nitrofurantoin 1/7/2025-1/11/2025 (5 days)  cephalexin 2/12/2025-2/18/2025 (7 days)  nitrofurantoin 3/1/2025-3/7/2025 (7  "days)  amoxicillin with cefpodoxime 4/16/2025 - 4/17/2025  Nitrofurantoin 4/17/2025-4/20/2025 (14 days)     Urinalysis and Cultures:  UA negative on 1/3/2025, 1/27/2025, and 3/17/2025.   UA and urine culture both negative on 1/15/2025.  Positive on 3/27 with E coli and Enterococcus in cultures        Interval Events     Reports having no other UTIs since last visit. Was switched to prasterone today instead of estrogen cream. Otherwise stable.          Physical Exam:     VITAL SIGNS:  Height 1.626 m (5' 4\"), weight 56.7 kg (125 lb), last menstrual period 06/05/2017, not currently breastfeeding.     GENERAL APPEARANCE: Not in acute distress    PHYSICAL EXAM:     Limited due to video-visit.     Patient appears well and in no acute distress. Alert, oriented, and conversant throughout the visit. No visible rashes or lesions noted on exposed skin. Breathing comfortably without signs of respiratory distress. No facial asymmetry or gross neurologic deficits observed. Voice clear and speech is fluent.         Data:     Lab Results   Component Value Date    WBCU 6 (H) 03/25/2025    WBCU 1 03/17/2025    WBCU 1 01/27/2025    WBCU 5-10 (A) 01/03/2025    WBCU >182 (H) 05/31/2024    WBCU 0-5 05/21/2024    WBCU 0 01/16/2010    NITRITE Positive (A) 03/25/2025    NITRITE Negative 03/17/2025    NITRITE Negative 01/27/2025    NITRITE Negative 01/03/2025    NITRITE Negative 05/31/2024    NITRITE Negative 05/21/2024    NITRITE Negative 02/17/2021    NITRITE Negative 01/16/2010     Culture (3/2/25): E. coli and E. faecalis, susceptible to nitrofurantoin  Prior CT A/P: No hydronephrosis; post-ureterocele resection; vaginal cuff thickening  MRI with only bladder wall thickening  PVR: 0 mL      Again, thank you for allowing me to participate in the care of your patient.      Sincerely,    Jh uGerin MD    "

## 2025-07-16 NOTE — LETTER
7/16/2025      RE: Aga Alanis  [address]  [facility] [address]       Virtual Visit Details    Type of service:  Video Visit     Originating Location (pt. Location): Home  Distant Location (provider location):  On-site  Platform used for Video Visit: [facility]    Division of Infectious Diseases and International Medicine    CLINICAL INFECTIOUS DISEASE OUTPATIENT                     FOLLOW UP CONSULTATION NOTE     Patient:  Aga Alanis  Date of birth 1980  Medical record number [id number]  Date of Visit:  July 16, 2025  Reason for Visit: Recurrent UTIs in the setting of prior bilateral ureterocele surgery  Initial visit: Progress Notes by Jh Guerin MD (04/16/2025 1:30 PM)     Telehealth Visit Details    Video Start Time: 13:10  Video End Time (time video stopped): 13:30  Originating Location (pt. location): Home  Additional Participants in Telehealth Visit: None   Distant Location (provider location):   [facility] INFECTIOUS DISEASE CLINIC [place]  Mode of Communication (Audio Visual or Audio Only):  Audio Visual         Assessment and Plan     Problem List:    Recurrent UTIs  Genitourinary syndrome of menopause  History of  bilateral  congenital ureteroceles s/p excision in 2024    Impression:    Ms. Alanis is a 45-year-old woman with history of UTIs, prior bilateral ureterocele resection, hysterectomy, and genitourinary syndrome of menopause, now seen for follow-up on UTI prevention. She has no concern for upper infections, stones or other niduses of infection. Since April, she had no other UTI episodes. After completing 2 week-long therapy with nitrofurantoin for acute cystitis, she remained on methenamine hippurate + vitamin C.     Of note, we weren't sure if all her symptoms were due to acute infection vs other etiologies given that many UA samples showed no WBCs and did not grow anything.     She continues to  work with  [employer], and her estrogen therapy for atrophy was recently changed to prasterone. While prasterone is not as extensively studied for UTI prevention, it may have local hormonal effects that support the urogenital mucosa, potentially having similar benefits.as estrogen.     We will continue suppression with methenamine which is a low-risk medication supported by existing evidence from ALTAIR [1]. According to this study, prophylaxis with methenamine may provide benefit comparable to prophylaxis with antibiotics (on average, 0.5 less UTI episodes per year with antibiotics compared with methenamine). We will tentatively plan to continue for another 6 months, which should be sufficient - but we will reassess at that time.     Plan:    Continue methenamine hippurate with vitamin C for additional six months     Prasterone (DHEA) therapy is expected to provide similar epithelial and local hormonal support as estrogen, likely will also be beneficial     Recommend obtaining urinalysis and urine culture before starting any antibiotics if UTI is suspected.     Follow up with us in 6 months.     Plan was discussed with attending physician, Dr. Hutson.    Jh Guerin MD   Infectious Disease Fellow   Available on Vocera    [1] Neisha et al. Alternative to prophylactic antibiotics for the treatment of recurrent urinary tract infections in women: multicentre, open label, randomised, non-inferiority trial BMJ 2022; 376 :[id number] doi:10.1136/[id number]       Antimicrobials & Cultures Consolidated     Previous:  Cephalexin 1/29/2025-2/4/2025 (7 days)  nitrofurantoin 1/7/2025-1/11/2025 (5 days)  cephalexin 2/12/2025-2/18/2025 (7 days)  nitrofurantoin 3/1/2025-3/7/2025 (7 days)  amoxicillin with cefpodoxime 4/16/2025 - 4/17/2025  Nitrofurantoin 4/17/2025-4/20/2025 (14 days)     Urinalysis and Cultures:  UA negative on 1/3/2025, 1/27/2025, and 3/17/2025.   UA and urine culture both negative on  "1/15/2025.  Positive on 3/27 with E coli and Enterococcus in cultures        Interval Events     Reports having no other UTIs since last visit. Was switched to prasterone today instead of estrogen cream. Otherwise stable.          Physical Exam:     VITAL SIGNS:  Height 1.626 m (5' 4\"), weight 56.7 kg (125 lb), last menstrual period 06/05/2017, not currently breastfeeding.     GENERAL APPEARANCE: Not in acute distress    PHYSICAL EXAM:     Limited due to video-visit.     Patient appears well and in no acute distress. Alert, oriented, and conversant throughout the visit. No visible rashes or lesions noted on exposed skin. Breathing comfortably without signs of respiratory distress. No facial asymmetry or gross neurologic deficits observed. Voice clear and speech is fluent.         Data:     Lab Results   Component Value Date    WBCU 6 (H) 03/25/2025    WBCU 1 03/17/2025    WBCU 1 01/27/2025    WBCU 5-10 (A) 01/03/2025    WBCU >182 (H) 05/31/2024    WBCU 0-5 05/21/2024    WBCU 0 01/16/2010    NITRITE Positive (A) 03/25/2025    NITRITE Negative 03/17/2025    NITRITE Negative 01/27/2025    NITRITE Negative 01/03/2025    NITRITE Negative 05/31/2024    NITRITE Negative 05/21/2024    NITRITE Negative 02/17/2021    NITRITE Negative 01/16/2010     Culture (3/2/25): E. coli and E. faecalis, susceptible to nitrofurantoin  Prior CT A/P: No hydronephrosis; post-ureterocele resection; vaginal cuff thickening  MRI with only bladder wall thickening  PVR: 0 mL    Infectious Disease Clinic Staff Note: Ms. Alanis had a Video Virtual Visit today and I participated in and discussed the case with Dr. Guerin, ID Fellow -- I agree with his interval history, assessment and plan in this ID Virtual Progress note. This note reflects my observations and opinions and the plan outlined fully reflects my approach. I have reviewed the available history, radiology, laboratory results, and reports with the Fellow.    Jh Guerin MD      "

## 2025-07-16 NOTE — Clinical Note
July 19, 2025       TO: Aga Alanis  [address] [facility] [place] [address]       DearMs.Zeke,    We are writing to inform you of your test results.    {ump results letter list:228651}    No results found from the In Basket message.    ***

## 2025-07-16 NOTE — NURSING NOTE
[facility] [address]  [facility] [address]    Is the patient currently in the state of [place]? YES    Visit mode: VIDEO    If the visit is dropped, the patient can be reconnected by:VIDEO VISIT: Text to cell phone:   Telephone Information:   Mobile [phone]       Will anyone else be joining the visit? NO  (If patient encounters technical issues they should call [phone])    Are changes needed to the allergy or medication list? No    Are refills needed on medications prescribed by this physician? NO    Rooming Documentation:  Questionnaire(s) not pre-assigned    Reason for visit: RECHECK    Angela CASTILLOF

## 2025-07-16 NOTE — LETTER
"7/16/2025       RE: Aga Alanis  [address]  [facility] [address]     Dear Colleague,    Thank you for referring your patient, Aga Alanis, to the [facility] INFECTIOUS DISEASE CLINIC [facility]. Please see a copy of my visit note below.    Virtual Visit Details    Type of service:  Video Visit     Originating Location (pt. Location): {video visit patient location:857744::\"Home\"}  {PROVIDER LOCATION On-site should be selected for visits conducted from your clinic location or adjoining [facility] hospital, academic office, or other nearby [facility] building. Off-site should be selected for all other provider locations, including home:344822}  Distant Location (provider location):  {virtual location provider:980940}  Platform used for Video Visit: {Virtual Visit Platforms:726085::\"BAROnova\"}     [facility]    Division of Infectious Diseases and International Medicine    CLINICAL INFECTIOUS DISEASE OUTPATIENT                     FOLLOW UP CONSULTATION NOTE     Patient:  Aga Alanis  Date of birth 1980  Medical record number [id number]  Date of Visit:  July 16, 2025  Reason for Visit: Recurrent UTIs in the setting of prior bilateral ureterocele surgery  Initial visit: Progress Notes by Jh Guerin MD (04/16/2025 1:30 PM)          Assessment and Plan     Problem List:    Recurrent UTIs  Genitourinary syndrome of menopause  History of  bilateral  congenital ureteroceles s/p excision in 2024    Impression:    Ms. Alanis is a 45-year-old woman with history of UTIs, prior bilateral ureterocele resection, hysterectomy, and genitourinary syndrome of menopause, now seen for follow-up on UTI prevention. She has no concern for upper infections, stones or other niduses of infection. Since April, she had no other UTI episodes. After completing 2 week-long therapy with nitrofurantoin for acute cystitis, she " remained on methenamine hippurate + vitamin C.     Of note, we weren't sure if all her symptoms were due to acute infection vs other etiologies given that many UA samples showed no WBCs and did not grow anything.     She continues to  work with [employer] [facility], and her estrogen therapy for atrophy was recently changed to prasterone. While prasterone is not as extensively studied for UTI prevention, it may have local hormonal effects that support the urogenital mucosa, potentially having similar benefits.as estrogen.     We will continue suppression with methenamine which is a low-risk medication supported by existing evidence from ALTAIR [1]. According to this study, prophylaxis with methenamine may provide benefit comparable to prophylaxis with antibiotics (on average, 0.5 less UTI episodes per year with antibiotics compared with methenamine). We will tentatively plan to continue for another 6 months, which should be sufficient - but we will reassess at that time.     Plan:    Continue methenamine hippurate with vitamin C for additional six months     Prasterone (DHEA) therapy is expected to provide similar epithelial and local hormonal support as estrogen, likely will also be beneficial     Recommend obtaining urinalysis and urine culture before starting any antibiotics if UTI is suspected.     Follow up with us in 6 months.     Plan was discussed with attending physician, Dr. Hutson.    Jh Guerin MD   Infectious Disease Fellow   Available on Vocera    [1] Neisha et al. Alternative to prophylactic antibiotics for the treatment of recurrent urinary tract infections in women: multicentre, open label, randomised, non-inferiority trial BMJ 2022; 376 :[id number] doi:10.1136/[id number]       Antimicrobials & Cultures Consolidated     Previous:  Cephalexin 1/29/2025-2/4/2025 (7 days)  nitrofurantoin 1/7/2025-1/11/2025 (5 days)  cephalexin 2/12/2025-2/18/2025 (7 days)  nitrofurantoin 3/1/2025-3/7/2025 (7  "days)  amoxicillin with cefpodoxime 4/16/2025 - 4/17/2025  Nitrofurantoin 4/17/2025-4/20/2025 (14 days)     Urinalysis and Cultures:  UA negative on 1/3/2025, 1/27/2025, and 3/17/2025.   UA and urine culture both negative on 1/15/2025.  Positive on 3/27 with E coli and Enterococcus in cultures        Interval Events     Reports having no other UTIs since last visit. Was switched to prasterone today instead of estrogen cream. Otherwise stable.          Physical Exam:     VITAL SIGNS:  Height 1.626 m (5' 4\"), weight 56.7 kg (125 lb), last menstrual period 06/05/2017, not currently breastfeeding.     GENERAL APPEARANCE: Not in acute distress    PHYSICAL EXAM:     Limited due to video-visit.     Patient appears well and in no acute distress. Alert, oriented, and conversant throughout the visit. No visible rashes or lesions noted on exposed skin. Breathing comfortably without signs of respiratory distress. No facial asymmetry or gross neurologic deficits observed. Voice clear and speech is fluent.         Data:     Lab Results   Component Value Date    WBCU 6 (H) 03/25/2025    WBCU 1 03/17/2025    WBCU 1 01/27/2025    WBCU 5-10 (A) 01/03/2025    WBCU >182 (H) 05/31/2024    WBCU 0-5 05/21/2024    WBCU 0 01/16/2010    NITRITE Positive (A) 03/25/2025    NITRITE Negative 03/17/2025    NITRITE Negative 01/27/2025    NITRITE Negative 01/03/2025    NITRITE Negative 05/31/2024    NITRITE Negative 05/21/2024    NITRITE Negative 02/17/2021    NITRITE Negative 01/16/2010     Culture (3/2/25): E. coli and E. faecalis, susceptible to nitrofurantoin  Prior CT A/P: No hydronephrosis; post-ureterocele resection; vaginal cuff thickening  MRI with only bladder wall thickening  PVR: 0 mL      Again, thank you for allowing me to participate in the care of your patient.      Sincerely,    Jh Guerin MD    "

## 2025-07-19 NOTE — PROGRESS NOTES
Infectious Disease Clinic Staff Note: Ms. Alanis had a Video Virtual Visit today and I participated in and discussed the case with Dr. Guerin, ID Fellow -- I agree with his interval history, assessment and plan in this ID Virtual Progress note. This note reflects my observations and opinions and the plan outlined fully reflects my approach. I have reviewed the available history, radiology, laboratory results, and reports with the Fellow.

## (undated) DEVICE — GLOVE BIOGEL PI MICRO SZ 7.5 48575

## (undated) DEVICE — SUCTION TIP YANKAUER W/O VENT K86

## (undated) DEVICE — NDL COUNTER 20CT 31142493

## (undated) DEVICE — PAD CHUX UNDERPAD 30X36" P3036C

## (undated) DEVICE — NDL SPINAL WHITACRE 22GA 3.5" 405010

## (undated) DEVICE — LINEN TOWEL PACK X5 5464

## (undated) DEVICE — ESU LIGASURE MARYLAND VESSEL LAP 44CM XLONG LF1944

## (undated) DEVICE — STPL SKIN 35W ROTATING HEAD PRW35

## (undated) DEVICE — GLOVE PROTEXIS W/NEU-THERA 7.0  2D73TE70

## (undated) DEVICE — CONNECTOR WATER VALVE PERFUSION PACK STR 020272801

## (undated) DEVICE — GLOVE PROTEXIS POWDER FREE SMT 7.5  2D72PT75X

## (undated) DEVICE — LINEN TOWEL PACK X30 5481

## (undated) DEVICE — BAG CLEAR TRASH 1.3M 39X33" P4040C

## (undated) DEVICE — SU ENDO STITCH SURGIDAC 2-0 ES-9 7" TRIPLE STITCH 170041

## (undated) DEVICE — BAG URINARY DRAIN 4000ML LF 153509

## (undated) DEVICE — DEVICE ENDO STITCH APPLIER 10MM 173016

## (undated) DEVICE — SYR BULB IRRIG 50ML LATEX FREE 0035280

## (undated) DEVICE — SOL NACL 0.9% IRRIG 1000ML BOTTLE 2F7124

## (undated) DEVICE — LINEN FULL SHEET 5511

## (undated) DEVICE — TUBING SUCTION 12"X1/4" N612

## (undated) DEVICE — SYSTEM CALIBRATION GASTRECTOMY SLEEVE VISIGI 3D 40FR 5240

## (undated) DEVICE — ANTIFOG SOLUTION W/FOAM PAD 31142527

## (undated) DEVICE — PAD PERI INDIV WRAP 11" 2022A

## (undated) DEVICE — SOL WATER IRRIG 3000ML BAG 2B7117

## (undated) DEVICE — PREP POVIDONE IODINE SOLUTION 10% 4OZ BOTTLE 29906-004

## (undated) DEVICE — PREP POVIDONE-IODINE 7.5% SCRUB 4OZ BOTTLE MDS093945

## (undated) DEVICE — SOL WATER IRRIG 1000ML BOTTLE 2F7114

## (undated) DEVICE — CLIP APPLIER ENDO 5MM M/L LIGAMAX EL5ML

## (undated) DEVICE — DRSG PRIMAPORE 02X3" 7133

## (undated) DEVICE — SYR 30ML LL W/O NDL 302832

## (undated) DEVICE — PACK CYSTO CUSTOM ASC

## (undated) DEVICE — ESU GROUND PAD ADULT W/CORD E7507

## (undated) DEVICE — STPL RELOAD REG TISSUE ECHELON 60 X 3.6MM BLUE GST60B

## (undated) DEVICE — SUCTION CANISTER MEDIVAC LINER 3000ML W/LID 65651-530

## (undated) DEVICE — LINEN TOWEL PACK X6 WHITE 5487

## (undated) DEVICE — DECANTER BAG 2002S

## (undated) DEVICE — ENDO TROCAR FIRST ENTRY KII FIOS ADV FIX 05X100MM CFF03

## (undated) DEVICE — GLOVE PROTEXIS MICRO 6.5  2D73PM65

## (undated) DEVICE — ENDO TROCAR OPTICAL ACCESS KII Z-THRD 15X100MM C0R37

## (undated) DEVICE — SUCTION MANIFOLD NEPTUNE 2 SYS 1 PORT 702-025-000

## (undated) DEVICE — PREP CHLORAPREP 26ML TINTED ORANGE  260815

## (undated) DEVICE — SU VICRYL 0 CT-1 27" J340H

## (undated) DEVICE — ESU ELEC LOOP 24FR 20750G

## (undated) DEVICE — STPL POWERED ECHELON LONG 60MM PLEE60A

## (undated) DEVICE — LINEN HALF SHEET 5512

## (undated) DEVICE — ENDO POUCH UNIVERSAL RETRIEVAL SYSTEM INZII 12/15MM CD004

## (undated) DEVICE — ENDO TROCAR SLEEVE KII ADV FIXATION 05X100MM CFS02

## (undated) DEVICE — SOL NACL 0.9% IRRIG 500ML BOTTLE 2F7123

## (undated) DEVICE — DECANTER VIAL 2006S

## (undated) DEVICE — Device

## (undated) DEVICE — NDL INSUFFLATION 13GA 150MM C2202

## (undated) DEVICE — CATH TRAY FOLEY 16FR DRAINAGE BAG STATLOCK 899916

## (undated) DEVICE — GLOVE PROTEXIS POWDER FREE SMT 8.5 2D72PT85X

## (undated) DEVICE — PACK MINOR LITHOTOMY RIDGES

## (undated) RX ORDER — DEXAMETHASONE SODIUM PHOSPHATE 4 MG/ML
INJECTION, SOLUTION INTRA-ARTICULAR; INTRALESIONAL; INTRAMUSCULAR; INTRAVENOUS; SOFT TISSUE
Status: DISPENSED
Start: 2024-05-24

## (undated) RX ORDER — GENTAMICIN 40 MG/ML
INJECTION, SOLUTION INTRAMUSCULAR; INTRAVENOUS
Status: DISPENSED
Start: 2024-05-24

## (undated) RX ORDER — ONDANSETRON 2 MG/ML
INJECTION INTRAMUSCULAR; INTRAVENOUS
Status: DISPENSED
Start: 2021-04-26

## (undated) RX ORDER — CEFAZOLIN SODIUM 2 G/100ML
INJECTION, SOLUTION INTRAVENOUS
Status: DISPENSED
Start: 2018-12-29

## (undated) RX ORDER — PROPOFOL 10 MG/ML
INJECTION, EMULSION INTRAVENOUS
Status: DISPENSED
Start: 2024-05-24

## (undated) RX ORDER — FENTANYL CITRATE 50 UG/ML
INJECTION, SOLUTION INTRAMUSCULAR; INTRAVENOUS
Status: DISPENSED
Start: 2024-05-24

## (undated) RX ORDER — FENTANYL CITRATE 50 UG/ML
INJECTION, SOLUTION INTRAMUSCULAR; INTRAVENOUS
Status: DISPENSED
Start: 2021-04-26

## (undated) RX ORDER — LIDOCAINE HYDROCHLORIDE 20 MG/ML
INJECTION, SOLUTION EPIDURAL; INFILTRATION; INTRACAUDAL; PERINEURAL
Status: DISPENSED
Start: 2021-04-26

## (undated) RX ORDER — ONDANSETRON 2 MG/ML
INJECTION INTRAMUSCULAR; INTRAVENOUS
Status: DISPENSED
Start: 2024-05-24

## (undated) RX ORDER — GLYCOPYRROLATE 0.2 MG/ML
INJECTION INTRAMUSCULAR; INTRAVENOUS
Status: DISPENSED
Start: 2024-05-24

## (undated) RX ORDER — ONDANSETRON 2 MG/ML
INJECTION INTRAMUSCULAR; INTRAVENOUS
Status: DISPENSED
Start: 2018-12-29

## (undated) RX ORDER — PROPOFOL 10 MG/ML
INJECTION, EMULSION INTRAVENOUS
Status: DISPENSED
Start: 2021-04-26

## (undated) RX ORDER — SODIUM CHLORIDE, SODIUM LACTATE, POTASSIUM CHLORIDE, CALCIUM CHLORIDE 600; 310; 30; 20 MG/100ML; MG/100ML; MG/100ML; MG/100ML
INJECTION, SOLUTION INTRAVENOUS
Status: DISPENSED
Start: 2021-04-26

## (undated) RX ORDER — HYDROMORPHONE HYDROCHLORIDE 1 MG/ML
INJECTION, SOLUTION INTRAMUSCULAR; INTRAVENOUS; SUBCUTANEOUS
Status: DISPENSED
Start: 2021-04-26

## (undated) RX ORDER — CEFAZOLIN SODIUM 2 G/100ML
INJECTION, SOLUTION INTRAVENOUS
Status: DISPENSED
Start: 2021-04-26

## (undated) RX ORDER — LIDOCAINE HYDROCHLORIDE 20 MG/ML
JELLY TOPICAL
Status: DISPENSED
Start: 2024-03-06

## (undated) RX ORDER — ACETAMINOPHEN 325 MG/1
TABLET ORAL
Status: DISPENSED
Start: 2024-05-24

## (undated) RX ORDER — CEFAZOLIN SODIUM 2 G/50ML
SOLUTION INTRAVENOUS
Status: DISPENSED
Start: 2024-05-24

## (undated) RX ORDER — BUPIVACAINE HYDROCHLORIDE 2.5 MG/ML
INJECTION, SOLUTION EPIDURAL; INFILTRATION; INTRACAUDAL
Status: DISPENSED
Start: 2021-04-26

## (undated) RX ORDER — FENTANYL CITRATE 50 UG/ML
INJECTION, SOLUTION INTRAMUSCULAR; INTRAVENOUS
Status: DISPENSED
Start: 2018-12-29

## (undated) RX ORDER — SCOLOPAMINE TRANSDERMAL SYSTEM 1 MG/1
PATCH, EXTENDED RELEASE TRANSDERMAL
Status: DISPENSED
Start: 2021-04-26

## (undated) RX ORDER — PROPOFOL 10 MG/ML
INJECTION, EMULSION INTRAVENOUS
Status: DISPENSED
Start: 2018-12-29

## (undated) RX ORDER — DEXAMETHASONE SODIUM PHOSPHATE 4 MG/ML
INJECTION, SOLUTION INTRA-ARTICULAR; INTRALESIONAL; INTRAMUSCULAR; INTRAVENOUS; SOFT TISSUE
Status: DISPENSED
Start: 2021-04-26